# Patient Record
Sex: FEMALE | Race: WHITE | NOT HISPANIC OR LATINO | Employment: FULL TIME | URBAN - METROPOLITAN AREA
[De-identification: names, ages, dates, MRNs, and addresses within clinical notes are randomized per-mention and may not be internally consistent; named-entity substitution may affect disease eponyms.]

---

## 2019-03-03 ENCOUNTER — APPOINTMENT (EMERGENCY)
Dept: CT IMAGING | Facility: HOSPITAL | Age: 59
End: 2019-03-03
Payer: COMMERCIAL

## 2019-03-03 ENCOUNTER — HOSPITAL ENCOUNTER (EMERGENCY)
Facility: HOSPITAL | Age: 59
Discharge: HOME | End: 2019-03-03
Attending: EMERGENCY MEDICINE | Admitting: EMERGENCY MEDICINE
Payer: COMMERCIAL

## 2019-03-03 ENCOUNTER — OFFICE VISIT (OUTPATIENT)
Dept: URGENT CARE | Age: 59
End: 2019-03-03
Payer: COMMERCIAL

## 2019-03-03 VITALS
SYSTOLIC BLOOD PRESSURE: 218 MMHG | TEMPERATURE: 99.3 F | BODY MASS INDEX: 30.38 KG/M2 | OXYGEN SATURATION: 98 % | HEART RATE: 96 BPM | WEIGHT: 177 LBS | DIASTOLIC BLOOD PRESSURE: 92 MMHG | RESPIRATION RATE: 18 BRPM

## 2019-03-03 VITALS
BODY MASS INDEX: 30.65 KG/M2 | TEMPERATURE: 98.1 F | HEART RATE: 66 BPM | DIASTOLIC BLOOD PRESSURE: 84 MMHG | WEIGHT: 178.57 LBS | RESPIRATION RATE: 16 BRPM | OXYGEN SATURATION: 97 % | SYSTOLIC BLOOD PRESSURE: 205 MMHG

## 2019-03-03 DIAGNOSIS — I10 ACCELERATED HYPERTENSION: Primary | ICD-10-CM

## 2019-03-03 DIAGNOSIS — I10 HYPERTENSION, UNSPECIFIED TYPE: Primary | ICD-10-CM

## 2019-03-03 LAB
ALBUMIN SERPL BCP-MCNC: 4.2 G/DL (ref 3.5–5)
ALP SERPL-CCNC: 75 U/L (ref 46–116)
ALT SERPL W P-5'-P-CCNC: 21 U/L (ref 12–78)
ANION GAP SERPL CALCULATED.3IONS-SCNC: 10 MMOL/L (ref 4–13)
AST SERPL W P-5'-P-CCNC: 24 U/L (ref 5–45)
BASOPHILS # BLD AUTO: 0.05 THOUSANDS/ΜL (ref 0–0.1)
BASOPHILS NFR BLD AUTO: 1 % (ref 0–1)
BILIRUB SERPL-MCNC: 0.21 MG/DL (ref 0.2–1)
BUN SERPL-MCNC: 9 MG/DL (ref 5–25)
CALCIUM SERPL-MCNC: 9.5 MG/DL (ref 8.3–10.1)
CHLORIDE SERPL-SCNC: 100 MMOL/L (ref 100–108)
CO2 SERPL-SCNC: 28 MMOL/L (ref 21–32)
CREAT SERPL-MCNC: 0.85 MG/DL (ref 0.6–1.3)
EOSINOPHIL # BLD AUTO: 0.16 THOUSAND/ΜL (ref 0–0.61)
EOSINOPHIL NFR BLD AUTO: 3 % (ref 0–6)
ERYTHROCYTE [DISTWIDTH] IN BLOOD BY AUTOMATED COUNT: 13.1 % (ref 11.6–15.1)
GFR SERPL CREATININE-BSD FRML MDRD: 76 ML/MIN/1.73SQ M
GLUCOSE SERPL-MCNC: 102 MG/DL (ref 65–140)
HCT VFR BLD AUTO: 44.1 % (ref 34.8–46.1)
HGB BLD-MCNC: 14.5 G/DL (ref 11.5–15.4)
IMM GRANULOCYTES # BLD AUTO: 0.02 THOUSAND/UL (ref 0–0.2)
IMM GRANULOCYTES NFR BLD AUTO: 0 % (ref 0–2)
LYMPHOCYTES # BLD AUTO: 1.39 THOUSANDS/ΜL (ref 0.6–4.47)
LYMPHOCYTES NFR BLD AUTO: 22 % (ref 14–44)
MCH RBC QN AUTO: 34.4 PG (ref 26.8–34.3)
MCHC RBC AUTO-ENTMCNC: 32.9 G/DL (ref 31.4–37.4)
MCV RBC AUTO: 105 FL (ref 82–98)
MONOCYTES # BLD AUTO: 0.31 THOUSAND/ΜL (ref 0.17–1.22)
MONOCYTES NFR BLD AUTO: 5 % (ref 4–12)
NEUTROPHILS # BLD AUTO: 4.3 THOUSANDS/ΜL (ref 1.85–7.62)
NEUTS SEG NFR BLD AUTO: 69 % (ref 43–75)
NRBC BLD AUTO-RTO: 0 /100 WBCS
PLATELET # BLD AUTO: 214 THOUSANDS/UL (ref 149–390)
PMV BLD AUTO: 9.9 FL (ref 8.9–12.7)
POTASSIUM SERPL-SCNC: 3.8 MMOL/L (ref 3.5–5.3)
PROT SERPL-MCNC: 8.5 G/DL (ref 6.4–8.2)
RBC # BLD AUTO: 4.22 MILLION/UL (ref 3.81–5.12)
SODIUM SERPL-SCNC: 138 MMOL/L (ref 136–145)
WBC # BLD AUTO: 6.23 THOUSAND/UL (ref 4.31–10.16)

## 2019-03-03 PROCEDURE — 70450 CT HEAD/BRAIN W/O DYE: CPT

## 2019-03-03 PROCEDURE — S9088 SERVICES PROVIDED IN URGENT: HCPCS | Performed by: NURSE PRACTITIONER

## 2019-03-03 PROCEDURE — 99285 EMERGENCY DEPT VISIT HI MDM: CPT

## 2019-03-03 PROCEDURE — 36415 COLL VENOUS BLD VENIPUNCTURE: CPT | Performed by: EMERGENCY MEDICINE

## 2019-03-03 PROCEDURE — 80053 COMPREHEN METABOLIC PANEL: CPT | Performed by: EMERGENCY MEDICINE

## 2019-03-03 PROCEDURE — 85025 COMPLETE CBC W/AUTO DIFF WBC: CPT | Performed by: EMERGENCY MEDICINE

## 2019-03-03 PROCEDURE — 99213 OFFICE O/P EST LOW 20 MIN: CPT | Performed by: NURSE PRACTITIONER

## 2019-03-03 PROCEDURE — 93005 ELECTROCARDIOGRAM TRACING: CPT

## 2019-03-03 PROCEDURE — 96374 THER/PROPH/DIAG INJ IV PUSH: CPT

## 2019-03-03 PROCEDURE — 96375 TX/PRO/DX INJ NEW DRUG ADDON: CPT

## 2019-03-03 RX ORDER — AMLODIPINE BESYLATE 5 MG/1
5 TABLET ORAL DAILY
Qty: 30 TABLET | Refills: 0 | Status: SHIPPED | OUTPATIENT
Start: 2019-03-03 | End: 2019-03-21

## 2019-03-03 RX ORDER — HYDRALAZINE HYDROCHLORIDE 20 MG/ML
5 INJECTION INTRAMUSCULAR; INTRAVENOUS ONCE
Status: COMPLETED | OUTPATIENT
Start: 2019-03-03 | End: 2019-03-03

## 2019-03-03 RX ORDER — LABETALOL 20 MG/4 ML (5 MG/ML) INTRAVENOUS SYRINGE
10 ONCE
Status: COMPLETED | OUTPATIENT
Start: 2019-03-03 | End: 2019-03-03

## 2019-03-03 RX ADMIN — HYDRALAZINE HYDROCHLORIDE 5 MG: 20 INJECTION INTRAMUSCULAR; INTRAVENOUS at 15:33

## 2019-03-03 RX ADMIN — LABETALOL 20 MG/4 ML (5 MG/ML) INTRAVENOUS SYRINGE 10 MG: at 13:59

## 2019-03-03 NOTE — PROGRESS NOTES
[facility] [employer]        NAME: Shania Salazar is a 63 y o  female  : 1960    MRN: [id number]  DATE: March 3, 2019  TIME: 12:24 PM    Assessment and Plan   Hypertension, unspecified type [I10]  1  Hypertension, unspecified type  Transfer to other facility         Patient Instructions     Patient stable in office  Proceed to ED  Follow up with PCP in 3-5 days  Proceed to  ER if symptoms worsen  Chief Complaint     Chief Complaint   Patient presents with    Hypertension         History of Present Illness       60-year-old female presenting today with reports of high blood pressure noticed yesterday after "not feeling right " She states BP yesterday was 190s/90s  She felt off and had a medical tech check it for her  In office today, she is 218/92 manually  No CP, SOB, dizziness, headache, blurred vision, weakness, or confusion  She has not seen a PCP "in a long time " Poor diet but states she limits salt  No other complaints  Review of Systems   Review of Systems   Constitutional: Negative  Respiratory: Negative  Cardiovascular: Negative  Musculoskeletal: Negative  Neurological: Negative  Current Medications     No current outpatient medications on file  Current Allergies     Allergies as of 2019    (No Known Allergies)            The following portions of the patient's history were reviewed and updated as appropriate: allergies, current medications, past family history, past medical history, past social history, past surgical history and problem list      History reviewed  No pertinent past medical history  Past Surgical History:   Procedure Laterality Date    APPENDECTOMY      APPENDISITIS SURGERY AGE 26    BACK SURGERY      BROKEN BACK BED REST AND ACK BRACE        Family History   Problem Relation Age of Onset    COPD Mother          Medications have been verified          Objective   BP (!) 218/92 Comment: manual right arm  Pulse 96   Temp 99 3 °F (37 4 °C)   Resp 18   Wt 80 3 kg (177 lb)   SpO2 98%   BMI 30 38 kg/m²        Physical Exam     Physical Exam   Constitutional: She is oriented to person, place, and time  She appears well-developed and well-nourished  Eyes: Pupils are equal, round, and reactive to light  Conjunctivae and EOM are normal    Cardiovascular: Normal rate, regular rhythm and normal heart sounds  Pulmonary/Chest: Effort normal and breath sounds normal    Musculoskeletal: Normal range of motion  Neurological: She is alert and oriented to person, place, and time  She has normal strength  No cranial nerve deficit or sensory deficit  She displays a negative Romberg sign  GCS eye subscore is 4  GCS verbal subscore is 5  GCS motor subscore is 6  Skin: Skin is warm and dry  Nursing note and vitals reviewed

## 2019-03-03 NOTE — ED NOTES
Spoke with Dr Rafi Ramos, made him aware that pts BP is now 171/81  He states to hold hydralazine unless pt elevates again  Pt asked if she can go to bathroom, assisted pt to monitor at this time        Arin Lee RN  03/03/19 [id number]

## 2019-03-03 NOTE — ED PROVIDER NOTES
History  Chief Complaint   Patient presents with    Hypertension     Pt reports no known diagnosis of HTN, yesterday at work felt "weird and foggy", had BP checked at noted it was extremely high  reports gait feels off       History provided by:  Patient   used: No    Hypertension   Severity:  Moderate  Onset quality:  Gradual  Duration:  2 days  Timing:  Intermittent  Progression:  Unchanged  Chronicity:  Recurrent  Time since last dose of antihypertensive: not on meds  Context: stress    Relieved by:  Nothing  Worsened by:  Nothing  Ineffective treatments:  None tried  Associated symptoms: no abdominal pain, no blurred vision, no chest pain, no dizziness, no fever, no headaches, no loss of consciousness, no nausea, no neck pain, no shortness of breath, not vomiting and no weakness    Risk factors: no cardiac disease and no kidney disease        None       Past Medical History:   Diagnosis Date    Hypertension        Past Surgical History:   Procedure Laterality Date    APPENDECTOMY      APPENDISITIS SURGERY AGE 26       Family History   Problem Relation Age of Onset    COPD Mother      I have reviewed and agree with the history as documented  Social History     Tobacco Use    Smoking status: Current Every Day Smoker     Packs/day: 0 75     Types: Cigarettes    Smokeless tobacco: Never Used    Tobacco comment: 10 CIGARETTES PER DAY - NO PASSIVE SMOKE EXPOSURE    Substance Use Topics    Alcohol use: Yes     Alcohol/week: 4 2 oz     Types: 7 Glasses of wine per week    Drug use: Not Currently        Review of Systems   Constitutional: Negative for chills and fever  HENT: Negative for facial swelling, sore throat and trouble swallowing  Eyes: Negative for blurred vision, pain and visual disturbance  Respiratory: Negative for cough and shortness of breath  Cardiovascular: Negative for chest pain and leg swelling     Gastrointestinal: Negative for abdominal pain, blood in stool, diarrhea, nausea and vomiting  Genitourinary: Negative for dysuria and flank pain  Musculoskeletal: Negative for back pain, neck pain and neck stiffness  Skin: Negative for pallor and rash  Allergic/Immunologic: Negative for environmental allergies and immunocompromised state  Neurological: Negative for dizziness, loss of consciousness, weakness and headaches  Hematological: Negative for adenopathy  Does not bruise/bleed easily  Psychiatric/Behavioral: Negative for agitation and behavioral problems  All other systems reviewed and are negative  Physical Exam  Physical Exam   Constitutional: She is oriented to person, place, and time  She appears well-developed and well-nourished  No distress  HENT:   Head: Normocephalic and atraumatic  Eyes: Pupils are equal, round, and reactive to light  EOM are normal    Neck: Normal range of motion  Neck supple  Cardiovascular: Normal rate, regular rhythm, normal heart sounds and intact distal pulses  Pulmonary/Chest: Effort normal and breath sounds normal    Abdominal: Soft  Bowel sounds are normal  There is no tenderness  There is no rebound and no guarding  Musculoskeletal: Normal range of motion  Neurological: She is alert and oriented to person, place, and time  No cranial nerve deficit  Coordination normal    Skin: Skin is warm and dry  Psychiatric: She has a normal mood and affect  Nursing note and vitals reviewed        Vital Signs  ED Triage Vitals   Temperature Pulse Respirations Blood Pressure SpO2   03/03/19 1300 03/03/19 1256 03/03/19 1256 03/03/19 1256 03/03/19 1256   98 1 °F (36 7 °C) 90 16 (!) 238/108 98 %      Temp Source Heart Rate Source Patient Position - Orthostatic VS BP Location FiO2 (%)   03/03/19 1300 03/03/19 1433 03/03/19 1334 03/03/19 1256 --   Oral Monitor Lying Right arm       Pain Score       03/03/19 1256       No Pain           Vitals:    03/03/19 1401 03/03/19 1433 03/03/19 1531 03/03/19 1557   BP: (!) 205/88 (!) 171/81 (!) 206/102 (!) 205/84   Pulse: 70 62 68 66   Patient Position - Orthostatic VS:  Lying Lying Lying       Visual Acuity  Visual Acuity      Most Recent Value   L Pupil Size (mm)  3   R Pupil Size (mm)  3          ED Medications  Medications   Labetalol HCl (NORMODYNE) injection 10 mg (10 mg Intravenous Given 3/3/19 1359)   hydrALAZINE (APRESOLINE) injection 5 mg (5 mg Intravenous Given 3/3/19 1533)       Diagnostic Studies  Results Reviewed     Procedure Component Value Units Date/Time    Comprehensive metabolic panel [[id number]]  (Abnormal) Collected:  03/03/19 1311    Lab Status:  Final result Specimen:  Blood from Arm, Left Updated:  03/03/19 1417     Sodium 138 mmol/L      Potassium 3 8 mmol/L      Chloride 100 mmol/L      CO2 28 mmol/L      ANION GAP 10 mmol/L      BUN 9 mg/dL      Creatinine 0 85 mg/dL      Glucose 102 mg/dL      Calcium 9 5 mg/dL      AST 24 U/L      ALT 21 U/L      Alkaline Phosphatase 75 U/L      Total Protein 8 5 g/dL      Albumin 4 2 g/dL      Total Bilirubin 0 21 mg/dL      eGFR 76 ml/min/1 73sq m     Narrative:       National Kidney Disease Education Program recommendations are as follows:  GFR calculation is accurate only with a steady state creatinine  Chronic Kidney disease less than 60 ml/min/1 73 sq  meters  Kidney failure less than 15 ml/min/1 73 sq  meters      CBC and differential [[id number]]  (Abnormal) Collected:  03/03/19 1311    Lab Status:  Final result Specimen:  Blood from Arm, Left Updated:  03/03/19 1356     WBC 6 23 Thousand/uL      RBC 4 22 Million/uL      Hemoglobin 14 5 g/dL      Hematocrit 44 1 %       fL      MCH 34 4 pg      MCHC 32 9 g/dL      RDW 13 1 %      MPV 9 9 fL      Platelets 335 Thousands/uL      nRBC 0 /100 WBCs      Neutrophils Relative 69 %      Immat GRANS % 0 %      Lymphocytes Relative 22 %      Monocytes Relative 5 %      Eosinophils Relative 3 %      Basophils Relative 1 %      Neutrophils Absolute 4 30 Thousands/µL Immature Grans Absolute 0 02 Thousand/uL      Lymphocytes Absolute 1 39 Thousands/µL      Monocytes Absolute 0 31 Thousand/µL      Eosinophils Absolute 0 16 Thousand/µL      Basophils Absolute 0 05 Thousands/µL                  CT head without contrast   Final Result by [place] MD (03/03 9884)      No acute intracranial abnormality                    Workstation performed: [id number]                    Procedures  ECG 12 Lead Documentation  Date/Time: 3/3/2019 4:14 PM  Performed by: Estelle Damon MD  Authorized by: Estelle Damon MD     Indications / Diagnosis:  Hypertension  ECG reviewed by me, the ED Provider: yes    Patient location:  ED  Interpretation:     Interpretation: normal    Rate:     ECG rate:  63    ECG rate assessment: normal    Rhythm:     Rhythm: sinus rhythm    Ectopy:     Ectopy: none    QRS:     QRS axis:  Normal  Conduction:     Conduction: normal    ST segments:     ST segments:  Normal  T waves:     T waves: normal      CriticalCare Time  Performed by: Estelle Damon MD  Authorized by: Estelle Damon MD     Critical care provider statement:     Critical care time (minutes):  30    Critical care time was exclusive of:  Separately billable procedures and treating other patients and teaching time    Critical care was necessary to treat or prevent imminent or life-threatening deterioration of the following conditions:  Circulatory failure (Accelerated hypertension, requiring IV meds)    Critical care was time spent personally by me on the following activities:  Blood draw for specimens, obtaining history from patient or surrogate, development of treatment plan with patient or surrogate, discussions with consultants, evaluation of patient's response to treatment, examination of patient, interpretation of cardiac output measurements, ordering and performing treatments and interventions, ordering and review of laboratory studies, ordering and review of radiographic studies, re-evaluation of patient's condition and review of old charts    I assumed direction of critical care for this patient from another provider in my specialty: no             Phone Contacts  ED Phone Contact    ED Course  ED Course as of Mar 03 1634   Zaira Manrique Mar 03, 2019   1344 Blood pressure repeated 242/103, we will give Labetalol  Blood Pressure(!): 242/103   1431 BP noted after Labetalol, still high, we will order Hrydralazine  Blood Pressure(!): 205/88   1448 Blood pressure came down to 177/81 prior to hydralazine therefore hydralazine held  Blood Pressure(!): 171/81   1610 Blood pressure at 713/49, mainly systolic hypertension  Patient wants to go home, walked in ER without difficulty; will give amlodipine 5 mg, advise close follow-up with PCP  Blood Pressure(!): 205/84                               MDM  Number of Diagnoses or Management Options  Accelerated hypertension: new and requires workup  Diagnosis management comments: Patient is a 60-year-old female, comes in with complaints of hypertension, states that she felt dizzy yesterday, checked her blood pressure, was in 200s, denies headache, chest pain, dyspnea, abdominal or back pain  Exam no acute distress:  Vital signs stable, blood pressure noted to be high, pupils equal round reactive to light, no cranial nerve deficits, no focal neuro deficits, lungs clear, heart sounds normal, no peripheral edema, abdomen soft nontender  Impression:  Accelerated hypertension, will check labs to rule out renal insufficiency, get CT scan to rule out bleed due to nonspecific dizziness with hypertension, give labetalol, re-evaluate         Amount and/or Complexity of Data Reviewed  Clinical lab tests: reviewed and ordered  Tests in the radiology section of CPT®: reviewed and ordered  Tests in the medicine section of CPT®: ordered and reviewed  Independent visualization of images, tracings, or specimens: yes        Disposition  Final diagnoses:   Accelerated hypertension     Time reflects when diagnosis was documented in both MDM as applicable and the Disposition within this note     Time User Action Codes Description Comment    3/3/2019  4:18 PM Buzz, [address] Accelerated hypertension       ED Disposition     ED Disposition Condition Date/Time Comment    Discharge Stable Sun Mar 3, 2019  4:18 PM Kellen Taylor discharge to home/self care  Follow-up Information     Follow up With Specialties Details Why Contact Info Additional [address] [facility] Schedule an appointment as soon as possible for a visit in 2 days  [address] [place] [address] Drive [id number] [address] [facility] Emergency Department Emergency Medicine  If symptoms worsen [facility] [phone]  [place] [address] ED, [address], [place], [address]          Patient's Medications   Discharge Prescriptions    AMLODIPINE (NORVASC) 5 MG TABLET    Take 1 tablet (5 mg total) by mouth daily       Start Date: 3/3/2019  End Date: --       Order Dose: 5 mg       Quantity: 30 tablet    Refills: 0     No discharge procedures on file      ED Provider  Electronically Signed by           Immanuel Arellano MD  03/03/19 9682

## 2019-03-04 PROBLEM — I10 ESSENTIAL HYPERTENSION: Status: ACTIVE | Noted: 2019-03-04

## 2019-03-04 LAB
ATRIAL RATE: 63 BPM
P AXIS: 55 DEGREES
PR INTERVAL: 152 MS
QRS AXIS: 66 DEGREES
QRSD INTERVAL: 80 MS
QT INTERVAL: 388 MS
QTC INTERVAL: 397 MS
T WAVE AXIS: 68 DEGREES
VENTRICULAR RATE: 63 BPM

## 2019-03-04 PROCEDURE — 93010 ELECTROCARDIOGRAM REPORT: CPT | Performed by: INTERNAL MEDICINE

## 2019-03-05 ENCOUNTER — HOSPITAL ENCOUNTER (OUTPATIENT)
Dept: RADIOLOGY | Facility: IMAGING CENTER | Age: 59
Discharge: HOME | End: 2019-03-05
Payer: COMMERCIAL

## 2019-03-05 ENCOUNTER — OFFICE VISIT (OUTPATIENT)
Dept: FAMILY MEDICINE CLINIC | Facility: CLINIC | Age: 59
End: 2019-03-05
Payer: COMMERCIAL

## 2019-03-05 VITALS
HEART RATE: 104 BPM | SYSTOLIC BLOOD PRESSURE: 170 MMHG | BODY MASS INDEX: 32.28 KG/M2 | TEMPERATURE: 98.4 F | DIASTOLIC BLOOD PRESSURE: 104 MMHG | OXYGEN SATURATION: 97 % | HEIGHT: 62 IN | WEIGHT: 175.4 LBS | RESPIRATION RATE: 16 BRPM

## 2019-03-05 DIAGNOSIS — Z13.220 LIPID SCREENING: ICD-10-CM

## 2019-03-05 DIAGNOSIS — M25.511 CHRONIC RIGHT SHOULDER PAIN: ICD-10-CM

## 2019-03-05 DIAGNOSIS — G89.29 CHRONIC RIGHT SHOULDER PAIN: ICD-10-CM

## 2019-03-05 DIAGNOSIS — Z12.11 COLON CANCER SCREENING: ICD-10-CM

## 2019-03-05 DIAGNOSIS — Z11.59 ENCOUNTER FOR HEPATITIS C VIRUS SCREENING TEST FOR HIGH RISK PATIENT: Primary | ICD-10-CM

## 2019-03-05 DIAGNOSIS — I10 ESSENTIAL HYPERTENSION: ICD-10-CM

## 2019-03-05 DIAGNOSIS — Z91.89 ENCOUNTER FOR HEPATITIS C VIRUS SCREENING TEST FOR HIGH RISK PATIENT: Primary | ICD-10-CM

## 2019-03-05 PROCEDURE — 73030 X-RAY EXAM OF SHOULDER: CPT

## 2019-03-05 PROCEDURE — 99204 OFFICE O/P NEW MOD 45 MIN: CPT | Performed by: PHYSICIAN ASSISTANT

## 2019-03-05 RX ORDER — LISINOPRIL AND HYDROCHLOROTHIAZIDE 25; 20 MG/1; MG/1
1 TABLET ORAL DAILY
Qty: 90 TABLET | Refills: 1 | Status: SHIPPED | OUTPATIENT
Start: 2019-03-05 | End: 2019-06-27 | Stop reason: SINTOL

## 2019-03-05 NOTE — PATIENT INSTRUCTIONS
Hold amlodipine 5 milligrams daily  Start lisinopril/hydrochlorothiazide 20/25 milligrams daily  Proceed with blood work in 6-8 weeks to check potassium level, hepatitis C antibody and lipid panel  Avoid any NSAIDs which can elevate blood pressure including Advil, ibuprofen, naproxen, Aleve  Call 911 if having chest pain, shortness of breath, weakness, severe headache    Recommend physical therapy for chronic right shoulder pain  Apply ice to shoulder 3 times daily for 10 minutes  Follow-up if there is no improvement with therapy in 4-6 weeks or symptoms increase  Take Tylenol over-the-counter as needed as package direct for pain    Screening colonoscopy ordered  Hep C antibody ordered  Patient refuses flu vaccine  Patient prefers to hold on the mammogram at this time    She will consider this in the future  Follow-up here in 3 weeks to recheck blood pressure

## 2019-03-05 NOTE — PROGRESS NOTES
Assessment/Plan:    ER record has been reviewed    Patient Instructions   Hold amlodipine 5 milligrams daily  Start lisinopril/hydrochlorothiazide 20/25 milligrams daily  Proceed with blood work in 6-8 weeks to check potassium level, hepatitis C antibody and lipid panel  Avoid any NSAIDs which can elevate blood pressure including Advil, ibuprofen, naproxen, Aleve  Call 911 if having chest pain, shortness of breath, weakness, severe headache    Recommend physical therapy for chronic right shoulder pain  Apply ice to shoulder 3 times daily for 10 minutes  Follow-up if there is no improvement with therapy in 4-6 weeks or symptoms increase  Take Tylenol over-the-counter as needed as package direct for pain    Screening colonoscopy ordered  Hep C antibody ordered  Patient refuses flu vaccine  Patient prefers to hold on the mammogram at this time  She will consider this in the future  Follow-up here in 3 weeks to recheck blood pressure    M*[employer] software was used to dictate this note  It may contain errors with dictating incorrect words/spelling  Please contact provider directly for any questions  Diagnoses and all orders for this visit:    Encounter for hepatitis C virus screening test for high risk patient  -     Hepatitis C antibody; Future    Colon cancer screening  -     Cancel: Occult Blood, Fecal Immunochemical; Future  -     Ambulatory referral to General Surgery; Future    Essential hypertension  -     lisinopril-hydrochlorothiazide (PRINZIDE,ZESTORETIC) 20-25 MG per tablet; Take 1 tablet by mouth daily  -     Basic metabolic panel; Future  -     Lipid panel    Chronic right shoulder pain  -     Ambulatory referral to Physical Therapy;  Future  -     XR shoulder 2+ vw right; Future    Lipid screening  -     Lipid panel    Other orders  -     Cancel: PNEUMOCOCCAL POLYSACCHARIDE VACCINE 23-VALENT =>1YO SQ IM  -     Cancel: PREFERRED: influenza vaccine, [id number], quadrivalent, recombinant, PF, 0 5 mL, for patients 18 yr+ (FLUBLOK)          Subjective:      Patient ID: Marcos Armendariz is a 63 y o  female  Patient presents today for new patient establishment  She was recently seen in the emergency room because she was not feeling well  She was found to have significantly elevated blood pressure  She was placed on amlodipine 5 milligrams daily  She started the medication yesterday  She denies any chest pain, shortness of breath or swelling of her lower extremities  She states that she was told she had elevated blood pressure several years ago when she was seen at an urgent care center but thought maybe is related to her illness  She discontinued the medication that was given  Denies any known family history of hypertension but she is not sure of all the medical problems that her family had  Also complains of right shoulder pain since the summer of 2018  She states that she fell walking here in [facility] because of uneven pavement  She is not sure if she fell onto the right shoulder  Within an hour she noticed some discomfort of the right shoulder  The other days she was leaning down her palm and felt a pop in her shoulder  She continues to notice discomfort off and on especially with certain motions  Denies any treatment for the shoulder  She does not follow with any providers on a regular basis  The following portions of the patient's history were reviewed and updated as appropriate:   She  has a past medical history of Hypertension  She   Patient Active Problem List    Diagnosis Date Noted    Chronic right shoulder pain 03/05/2019    Encounter for hepatitis C virus screening test for high risk patient 03/05/2019    Colon cancer screening 03/05/2019    Lipid screening 03/05/2019    Essential hypertension 03/04/2019     She  has a past surgical history that includes Appendectomy  Her family history includes COPD in her mother  She  reports that she has been smoking cigarettes    She has been smoking about 0 50 packs per day  She has never used smokeless tobacco  She reports that she drinks about 4 2 oz of alcohol per week  She reports that she has current or past drug history  Current Outpatient Medications   Medication Sig Dispense Refill    amLODIPine (NORVASC) 5 mg tablet Take 1 tablet (5 mg total) by mouth daily 30 tablet 0    lisinopril-hydrochlorothiazide (PRINZIDE,ZESTORETIC) 20-25 MG per tablet Take 1 tablet by mouth daily 90 tablet 1     No current facility-administered medications for this visit  Current Outpatient Medications on File Prior to Visit   Medication Sig    amLODIPine (NORVASC) 5 mg tablet Take 1 tablet (5 mg total) by mouth daily     No current facility-administered medications on file prior to visit  She is allergic to pollen extract       Review of Systems   Constitutional: Negative  HENT: Negative  Eyes: Negative  She does wear glasses   Respiratory: Negative  Cardiovascular: Negative  Gastrointestinal: Negative  Endocrine: Negative  Genitourinary: Negative  Musculoskeletal:        As stated in HPI   Allergic/Immunologic: Positive for environmental allergies  Neurological: Negative  Hematological: Negative  Psychiatric/Behavioral: Negative            Objective:      BP (!) 170/104   Pulse 104   Temp 98 4 °F (36 9 °C) (Tympanic)   Resp 16   Ht 5' 2" (1 575 m)   Wt 79 6 kg (175 lb 6 4 oz)   SpO2 97%   BMI 32 08 kg/m²          Physical Exam

## 2019-03-21 ENCOUNTER — OFFICE VISIT (OUTPATIENT)
Dept: FAMILY MEDICINE CLINIC | Facility: CLINIC | Age: 59
End: 2019-03-21
Payer: COMMERCIAL

## 2019-03-21 VITALS
TEMPERATURE: 97.9 F | OXYGEN SATURATION: 97 % | SYSTOLIC BLOOD PRESSURE: 128 MMHG | WEIGHT: 175.8 LBS | HEART RATE: 92 BPM | BODY MASS INDEX: 32.35 KG/M2 | DIASTOLIC BLOOD PRESSURE: 70 MMHG | HEIGHT: 62 IN | RESPIRATION RATE: 16 BRPM

## 2019-03-21 DIAGNOSIS — R26.9 GAIT ABNORMALITY: ICD-10-CM

## 2019-03-21 DIAGNOSIS — I10 ESSENTIAL HYPERTENSION: Primary | ICD-10-CM

## 2019-03-21 DIAGNOSIS — R05.3 CHRONIC COUGH: ICD-10-CM

## 2019-03-21 PROBLEM — F17.210 CIGARETTE NICOTINE DEPENDENCE WITHOUT COMPLICATION: Status: ACTIVE | Noted: 2019-03-21

## 2019-03-21 PROCEDURE — 99214 OFFICE O/P EST MOD 30 MIN: CPT | Performed by: PHYSICIAN ASSISTANT

## 2019-03-21 PROCEDURE — 3074F SYST BP LT 130 MM HG: CPT | Performed by: PHYSICIAN ASSISTANT

## 2019-03-21 PROCEDURE — 3008F BODY MASS INDEX DOCD: CPT | Performed by: PHYSICIAN ASSISTANT

## 2019-03-21 PROCEDURE — 3078F DIAST BP <80 MM HG: CPT | Performed by: PHYSICIAN ASSISTANT

## 2019-03-21 NOTE — PATIENT INSTRUCTIONS
Continue lisinopril/hydrochlorothiazide as directed  Advised if the cough is not improved in the next 2 weeks the only way to determine if the cough is related to the medication is by discontinuing the medication  The cough should resolve within several days of stopping the medication    Smoking cessation has been discussed  Refer to physical therapy for gait abnormality specifically with going down stairs  Refer to Neurology  Proceed with fasting blood work in about 6 weeks  Follow-up here in 3-4 months

## 2019-03-21 NOTE — PROGRESS NOTES
Assessment/Plan:    Patient Instructions   Continue lisinopril/hydrochlorothiazide as directed  Advised if the cough is not improved in the next 2 weeks the only way to determine if the cough is related to the medication is by discontinuing the medication  The cough should resolve within several days of stopping the medication  Smoking cessation has been discussed  Refer to physical therapy for gait abnormality specifically with going down stairs  Refer to Neurology  Proceed with fasting blood work in about 6 weeks  Follow-up here in 3-4 months    M*Modal software was used to dictate this note  It may contain errors with dictating incorrect words/spelling  Please contact provider directly for any questions  Diagnoses and all orders for this visit:    Essential hypertension    Gait abnormality  -     Ambulatory referral to Neurology; Future  -     Ambulatory referral to Physical Therapy; Future    Chronic cough          Subjective:      Patient ID: Betty Pastrana is a 63 y o  female  Patient returns today to recheck her blood pressure since she started the lisinopril/hydrochlorothiazide 20/25 milligrams daily  She does have a chronic cough  She does not know if the lisinopril possibly  exacerbated her cough  She does smoke at least a half pack of cigarettes daily  Denies any fevers or chills  She also states several weeks ago she had an episode where her she had a hard time getting the words out  There supervisor told her to lay down and speech returned to normal on its own  She never did go to the emergency room  She also complains of a problem with walking specifically when going downstairs  She has a fear of possibly falling  This has been an ongoing problem  She has not made an appointment yet for her shoulder for physical therapy  The following portions of the patient's history were reviewed and updated as appropriate:   She  has a past medical history of Hypertension    She Patient Active Problem List    Diagnosis Date Noted    Gait abnormality 03/21/2019    Chronic cough 03/21/2019    Cigarette nicotine dependence without complication 84/76/6000    Chronic right shoulder pain 03/05/2019    Encounter for hepatitis C virus screening test for high risk patient 03/05/2019    Colon cancer screening 03/05/2019    Lipid screening 03/05/2019    Essential hypertension 03/04/2019     She  has a past surgical history that includes Appendectomy  Her family history includes COPD in her mother  She  reports that she has been smoking cigarettes  She has been smoking about 0 50 packs per day  She has never used smokeless tobacco  She reports that she drinks about 4 2 oz of alcohol per week  She reports that she has current or past drug history  Current Outpatient Medications   Medication Sig Dispense Refill    lisinopril-hydrochlorothiazide (PRINZIDE,ZESTORETIC) 20-25 MG per tablet Take 1 tablet by mouth daily 90 tablet 1     No current facility-administered medications for this visit  Current Outpatient Medications on File Prior to Visit   Medication Sig    lisinopril-hydrochlorothiazide (PRINZIDE,ZESTORETIC) 20-25 MG per tablet Take 1 tablet by mouth daily    [DISCONTINUED] amLODIPine (NORVASC) 5 mg tablet Take 1 tablet (5 mg total) by mouth daily     No current facility-administered medications on file prior to visit  She is allergic to pollen extract       Review of Systems   Neurological:        As stated in HPI         Objective:      /70 (BP Location: Left arm, Patient Position: Sitting, Cuff Size: Standard)   Pulse 92   Temp 97 9 °F (36 6 °C) (Tympanic)   Resp 16   Ht 5' 2" (1 575 m)   Wt 79 7 kg (175 lb 12 8 oz)   SpO2 97%   BMI 32 15 kg/m²          Physical Exam   Constitutional: She appears well-developed and well-nourished  No distress  HENT:   Head: Normocephalic and atraumatic     Right Ear: External ear normal    Left Ear: External ear normal  Mouth/Throat: Oropharynx is clear and moist  No oropharyngeal exudate  Neck: Neck supple  Cardiovascular: Normal rate, regular rhythm and normal heart sounds  No murmur heard  Pulmonary/Chest: Effort normal  No respiratory distress  She has no wheezes  She has no rales  Reduced breath sounds in all lung fields   Lymphadenopathy:     She has no cervical adenopathy

## 2019-06-18 PROBLEM — Z12.39 BREAST CANCER SCREENING: Status: ACTIVE | Noted: 2019-06-18

## 2019-06-19 ENCOUNTER — TELEPHONE (OUTPATIENT)
Dept: FAMILY MEDICINE CLINIC | Facility: CLINIC | Age: 59
End: 2019-06-19

## 2019-06-27 ENCOUNTER — OFFICE VISIT (OUTPATIENT)
Dept: FAMILY MEDICINE CLINIC | Facility: CLINIC | Age: 59
End: 2019-06-27
Payer: COMMERCIAL

## 2019-06-27 VITALS
RESPIRATION RATE: 16 BRPM | TEMPERATURE: 98.7 F | OXYGEN SATURATION: 97 % | HEART RATE: 100 BPM | SYSTOLIC BLOOD PRESSURE: 128 MMHG | DIASTOLIC BLOOD PRESSURE: 78 MMHG | BODY MASS INDEX: 31.69 KG/M2 | HEIGHT: 62 IN | WEIGHT: 172.2 LBS

## 2019-06-27 DIAGNOSIS — E66.9 OBESITY (BMI 30.0-34.9): ICD-10-CM

## 2019-06-27 DIAGNOSIS — F17.210 CIGARETTE NICOTINE DEPENDENCE WITHOUT COMPLICATION: ICD-10-CM

## 2019-06-27 DIAGNOSIS — R05.3 CHRONIC COUGH: ICD-10-CM

## 2019-06-27 DIAGNOSIS — Z12.31 SCREENING MAMMOGRAM, ENCOUNTER FOR: ICD-10-CM

## 2019-06-27 DIAGNOSIS — I10 ESSENTIAL HYPERTENSION: ICD-10-CM

## 2019-06-27 DIAGNOSIS — Z23 IMMUNIZATION DUE: Primary | ICD-10-CM

## 2019-06-27 PROBLEM — E66.811 OBESITY (BMI 30.0-34.9): Status: ACTIVE | Noted: 2019-06-27

## 2019-06-27 PROCEDURE — 99214 OFFICE O/P EST MOD 30 MIN: CPT | Performed by: PHYSICIAN ASSISTANT

## 2019-06-27 PROCEDURE — 3074F SYST BP LT 130 MM HG: CPT | Performed by: PHYSICIAN ASSISTANT

## 2019-06-27 PROCEDURE — 3008F BODY MASS INDEX DOCD: CPT | Performed by: PHYSICIAN ASSISTANT

## 2019-06-27 PROCEDURE — 3078F DIAST BP <80 MM HG: CPT | Performed by: PHYSICIAN ASSISTANT

## 2019-06-27 RX ORDER — HYDROCHLOROTHIAZIDE 25 MG/1
25 TABLET ORAL DAILY
Qty: 90 TABLET | Refills: 1 | Status: SHIPPED | OUTPATIENT
Start: 2019-06-27 | End: 2019-08-30

## 2019-06-27 RX ORDER — AMLODIPINE BESYLATE 10 MG/1
10 TABLET ORAL DAILY
Qty: 90 TABLET | Refills: 1 | Status: SHIPPED | OUTPATIENT
Start: 2019-06-27 | End: 2019-08-05 | Stop reason: SINTOL

## 2019-06-27 RX ORDER — ALBUTEROL SULFATE 90 UG/1
2 AEROSOL, METERED RESPIRATORY (INHALATION) EVERY 6 HOURS PRN
Qty: 18 G | Refills: 0 | Status: SHIPPED | OUTPATIENT
Start: 2019-06-27 | End: 2019-08-19 | Stop reason: SDUPTHER

## 2019-07-09 ENCOUNTER — OFFICE VISIT (OUTPATIENT)
Dept: FAMILY MEDICINE CLINIC | Facility: CLINIC | Age: 59
End: 2019-07-09
Payer: COMMERCIAL

## 2019-07-09 VITALS
DIASTOLIC BLOOD PRESSURE: 80 MMHG | BODY MASS INDEX: 31.32 KG/M2 | HEIGHT: 62 IN | WEIGHT: 170.2 LBS | OXYGEN SATURATION: 96 % | RESPIRATION RATE: 16 BRPM | TEMPERATURE: 98.4 F | SYSTOLIC BLOOD PRESSURE: 148 MMHG | HEART RATE: 102 BPM

## 2019-07-09 DIAGNOSIS — J30.1 SEASONAL ALLERGIC RHINITIS DUE TO POLLEN: Primary | ICD-10-CM

## 2019-07-09 DIAGNOSIS — I10 ESSENTIAL HYPERTENSION: ICD-10-CM

## 2019-07-09 DIAGNOSIS — F17.210 CIGARETTE NICOTINE DEPENDENCE WITHOUT COMPLICATION: ICD-10-CM

## 2019-07-09 DIAGNOSIS — R05.3 CHRONIC COUGH: ICD-10-CM

## 2019-07-09 PROCEDURE — 99214 OFFICE O/P EST MOD 30 MIN: CPT | Performed by: PHYSICIAN ASSISTANT

## 2019-07-09 RX ORDER — LORATADINE 10 MG/1
10 TABLET ORAL DAILY
Qty: 90 TABLET | Refills: 1 | Status: SHIPPED | OUTPATIENT
Start: 2019-07-09 | End: 2020-10-28 | Stop reason: ALTCHOICE

## 2019-07-09 RX ORDER — MONTELUKAST SODIUM 10 MG/1
10 TABLET ORAL
Qty: 90 TABLET | Refills: 1 | Status: SHIPPED | OUTPATIENT
Start: 2019-07-09 | End: 2020-10-28 | Stop reason: ALTCHOICE

## 2019-07-09 NOTE — PROGRESS NOTES
Assessment/Plan:    Recheck blood pressure is about the same    Refer to allergy/asthma, Dr Sarah Dia for further evaluation  Start loratadine 10 mg daily  Start montelukast 10 mg at bedtime  Continue Ventolin as needed which is usually once a day  I did explain to her that there may be a component of COPD with continued nicotine use  Continue amlodipine 10 mg daily and hydrochlorothiazide 25 mg daily  Recommend follow-up here to recheck blood pressure in about 6-8 weeks     Diagnoses and all orders for this visit:    Seasonal allergic rhinitis due to pollen  -     loratadine (CLARITIN) 10 mg tablet; Take 1 tablet (10 mg total) by mouth daily  -     montelukast (SINGULAIR) 10 mg tablet; Take 1 tablet (10 mg total) by mouth daily at bedtime  -     Ambulatory referral to Allergy; Future    Chronic cough  -     montelukast (SINGULAIR) 10 mg tablet; Take 1 tablet (10 mg total) by mouth daily at bedtime  -     Ambulatory referral to Allergy; Future    Cigarette nicotine dependence without complication    Essential hypertension          Subjective:      Patient ID: Ирина Sharma is a 63 y o  female  Patient presents today for follow-up for a cough that may have been related to lisinopril  She states that the funny sensation the back of her throat resolved since she discontinued the lisinopril  She is concerned about the possibility of allergy/asthma  She states that when she goes home she goes outside to smoke a cigarette  She starts noticing more nasal congestion and develops a cough in which she needs to utilize her Ventolin inhaler  Her daughter has asthma and allergies  Patient also states that she has clear nasal discharge is been ongoing for several months  She is not sure if it is related to the [place] tree in her backyard  She has never been tested for allergies  She continues to smoke at least half a pack of cigarettes daily  She is doing well on the amlodipine and hydrochlorothiazide    She does not monitor her blood pressure  The following portions of the patient's history were reviewed and updated as appropriate:   She  has a past medical history of Hypertension  She   Patient Active Problem List    Diagnosis Date Noted    Seasonal allergic rhinitis due to pollen 07/09/2019    Obesity (BMI 30 0-34 9) 06/27/2019    Breast cancer screening 06/18/2019    Gait abnormality 03/21/2019    Chronic cough 03/21/2019    Cigarette nicotine dependence without complication 80/65/1132    Chronic right shoulder pain 03/05/2019    Encounter for hepatitis C virus screening test for high risk patient 03/05/2019    Colon cancer screening 03/05/2019    Lipid screening 03/05/2019    Essential hypertension 03/04/2019     She  has a past surgical history that includes Appendectomy  Her family history includes COPD in her mother  She  reports that she has been smoking cigarettes  She has been smoking about 0 50 packs per day  She has never used smokeless tobacco  She reports that she drinks about 7 0 standard drinks of alcohol per week  She reports that she has current or past drug history  Current Outpatient Medications   Medication Sig Dispense Refill    albuterol (VENTOLIN HFA) 90 mcg/act inhaler Inhale 2 puffs every 6 (six) hours as needed for wheezing 18 g 0    amLODIPine (NORVASC) 10 mg tablet Take 1 tablet (10 mg total) by mouth daily 90 tablet 1    hydrochlorothiazide (HYDRODIURIL) 25 mg tablet Take 1 tablet (25 mg total) by mouth daily 90 tablet 1    loratadine (CLARITIN) 10 mg tablet Take 1 tablet (10 mg total) by mouth daily 90 tablet 1    montelukast (SINGULAIR) 10 mg tablet Take 1 tablet (10 mg total) by mouth daily at bedtime 90 tablet 1     No current facility-administered medications for this visit        Current Outpatient Medications on File Prior to Visit   Medication Sig    albuterol (VENTOLIN HFA) 90 mcg/act inhaler Inhale 2 puffs every 6 (six) hours as needed for wheezing    amLODIPine (NORVASC) 10 mg tablet Take 1 tablet (10 mg total) by mouth daily    hydrochlorothiazide (HYDRODIURIL) 25 mg tablet Take 1 tablet (25 mg total) by mouth daily     No current facility-administered medications on file prior to visit  She is allergic to lisinopril and pollen extract       Review of Systems   Constitutional: Negative for chills and fever  HENT: Positive for congestion, postnasal drip and rhinorrhea  Negative for sore throat  Respiratory: Positive for cough and wheezing  Objective:      /80 (BP Location: Left arm, Patient Position: Sitting, Cuff Size: Large)   Pulse 102   Temp 98 4 °F (36 9 °C) (Tympanic)   Resp 16   Ht 5' 2" (1 575 m)   Wt 77 2 kg (170 lb 3 2 oz)   SpO2 96%   BMI 31 13 kg/m²          Physical Exam   Constitutional: She appears well-developed and well-nourished  No distress  HENT:   Head: Normocephalic and atraumatic  Right Ear: External ear normal    Left Ear: External ear normal    Mouth/Throat: Oropharynx is clear and moist  No oropharyngeal exudate  Neck: Neck supple  Cardiovascular: Normal rate, regular rhythm and normal heart sounds  No murmur heard  Pulmonary/Chest: Effort normal and breath sounds normal  No respiratory distress  She has no wheezes  She has no rales  Lymphadenopathy:     She has no cervical adenopathy

## 2019-08-05 ENCOUNTER — OFFICE VISIT (OUTPATIENT)
Dept: FAMILY MEDICINE CLINIC | Facility: CLINIC | Age: 59
End: 2019-08-05
Payer: COMMERCIAL

## 2019-08-05 VITALS
DIASTOLIC BLOOD PRESSURE: 80 MMHG | TEMPERATURE: 98 F | HEIGHT: 62 IN | HEART RATE: 85 BPM | RESPIRATION RATE: 16 BRPM | BODY MASS INDEX: 32.09 KG/M2 | WEIGHT: 174.4 LBS | OXYGEN SATURATION: 96 % | SYSTOLIC BLOOD PRESSURE: 156 MMHG

## 2019-08-05 DIAGNOSIS — I10 ESSENTIAL HYPERTENSION: Primary | ICD-10-CM

## 2019-08-05 DIAGNOSIS — R60.0 BILATERAL LEG EDEMA: ICD-10-CM

## 2019-08-05 PROCEDURE — 99214 OFFICE O/P EST MOD 30 MIN: CPT | Performed by: PHYSICIAN ASSISTANT

## 2019-08-05 RX ORDER — IRBESARTAN 150 MG/1
150 TABLET ORAL
Qty: 90 TABLET | Refills: 1 | Status: SHIPPED | OUTPATIENT
Start: 2019-08-05 | End: 2019-08-19

## 2019-08-05 NOTE — PATIENT INSTRUCTIONS
Hold amlodipine which is likely the cause of the lower extremity edema  Continue hydrochlorothiazide 25 mg daily  Elevate legs above heart level to reduce swelling  Start Avapro 150 mg daily  Follow-up in 3-4 weeks to recheck blood pressure and leg swelling, sooner if symptoms increase

## 2019-08-05 NOTE — PROGRESS NOTES
Assessment/Plan:    Patient Instructions   Hold amlodipine which is likely the cause of the lower extremity edema  Continue hydrochlorothiazide 25 mg daily  Elevate legs above heart level to reduce swelling  Start Avapro 150 mg daily  Follow-up in 3-4 weeks to recheck blood pressure and leg swelling, sooner if symptoms increase    M*Modal software was used to dictate this note  It may contain errors with dictating incorrect words/spelling  Please contact provider directly for any questions  Diagnoses and all orders for this visit:    Essential hypertension  -     irbesartan (AVAPRO) 150 mg tablet; Take 1 tablet (150 mg total) by mouth daily at bedtime    Bilateral leg edema          Subjective:      Patient ID: Chase Biggs is a 63 y o  female  Patient presents today for acute visit for evaluation of bilateral leg she thinks may be due to 1 of her blood pressure medications  She is currently on hydrochlorothiazide 25 mg daily along with amlodipine 10 mg daily  She states the swelling started about a week ago  She tried stopping the hydrochlorothiazide with no improvement  She was concerned about discontinuing her blood pressure medications without being on anything  She denies any chest pain or shortness of breath  She does not monitor her blood pressure  She has noticed some redness of both of her legs also  The following portions of the patient's history were reviewed and updated as appropriate:   She  has a past medical history of Hypertension    She   Patient Active Problem List    Diagnosis Date Noted    Bilateral leg edema 08/05/2019    Seasonal allergic rhinitis due to pollen 07/09/2019    Obesity (BMI 30 0-34 9) 06/27/2019    Breast cancer screening 06/18/2019    Gait abnormality 03/21/2019    Chronic cough 03/21/2019    Cigarette nicotine dependence without complication 35/61/0688    Chronic right shoulder pain 03/05/2019    Encounter for hepatitis C virus screening test for high risk patient 03/05/2019    Colon cancer screening 03/05/2019    Lipid screening 03/05/2019    Essential hypertension 03/04/2019     She  has a past surgical history that includes Appendectomy  Her family history includes COPD in her mother  She  reports that she has been smoking cigarettes  She has been smoking about 0 50 packs per day  She has never used smokeless tobacco  She reports that she drinks about 7 0 standard drinks of alcohol per week  She reports that she has current or past drug history  Current Outpatient Medications   Medication Sig Dispense Refill    albuterol (VENTOLIN HFA) 90 mcg/act inhaler Inhale 2 puffs every 6 (six) hours as needed for wheezing 18 g 0    hydrochlorothiazide (HYDRODIURIL) 25 mg tablet Take 1 tablet (25 mg total) by mouth daily 90 tablet 1    irbesartan (AVAPRO) 150 mg tablet Take 1 tablet (150 mg total) by mouth daily at bedtime 90 tablet 1    loratadine (CLARITIN) 10 mg tablet Take 1 tablet (10 mg total) by mouth daily 90 tablet 1    montelukast (SINGULAIR) 10 mg tablet Take 1 tablet (10 mg total) by mouth daily at bedtime 90 tablet 1     No current facility-administered medications for this visit  Current Outpatient Medications on File Prior to Visit   Medication Sig    albuterol (VENTOLIN HFA) 90 mcg/act inhaler Inhale 2 puffs every 6 (six) hours as needed for wheezing    hydrochlorothiazide (HYDRODIURIL) 25 mg tablet Take 1 tablet (25 mg total) by mouth daily    loratadine (CLARITIN) 10 mg tablet Take 1 tablet (10 mg total) by mouth daily    montelukast (SINGULAIR) 10 mg tablet Take 1 tablet (10 mg total) by mouth daily at bedtime    [DISCONTINUED] amLODIPine (NORVASC) 10 mg tablet Take 1 tablet (10 mg total) by mouth daily     No current facility-administered medications on file prior to visit  She is allergic to amlodipine; lisinopril; and pollen extract       Review of Systems   Respiratory: Negative for shortness of breath  Cardiovascular: Positive for leg swelling  Negative for chest pain  Skin:        As stated in HPI         Objective:      /80 (BP Location: Left arm, Patient Position: Sitting, Cuff Size: Large)   Pulse 85   Temp 98 °F (36 7 °C) (Tympanic)   Resp 16   Ht 5' 2" (1 575 m)   Wt 79 1 kg (174 lb 6 4 oz)   SpO2 96%   BMI 31 90 kg/m²          Physical Exam   Constitutional: She appears well-developed and well-nourished  No distress  HENT:   Head: Normocephalic and atraumatic  Cardiovascular: Normal rate, regular rhythm and normal heart sounds  No murmur heard  Pulmonary/Chest: Effort normal and breath sounds normal  No respiratory distress  She has no wheezes  She has no rales  Musculoskeletal: She exhibits edema (1+ edema both lower extremities with mild erythema  )

## 2019-08-12 ENCOUNTER — OFFICE VISIT (OUTPATIENT)
Dept: URGENT CARE | Age: 59
End: 2019-08-12
Payer: COMMERCIAL

## 2019-08-12 VITALS
OXYGEN SATURATION: 95 % | DIASTOLIC BLOOD PRESSURE: 90 MMHG | BODY MASS INDEX: 31.36 KG/M2 | RESPIRATION RATE: 20 BRPM | WEIGHT: 170.4 LBS | HEIGHT: 62 IN | HEART RATE: 100 BPM | SYSTOLIC BLOOD PRESSURE: 182 MMHG | TEMPERATURE: 97.9 F

## 2019-08-12 DIAGNOSIS — B96.89 ACUTE BACTERIAL BRONCHITIS: Primary | ICD-10-CM

## 2019-08-12 DIAGNOSIS — J20.8 ACUTE BACTERIAL BRONCHITIS: Primary | ICD-10-CM

## 2019-08-12 PROCEDURE — 99213 OFFICE O/P EST LOW 20 MIN: CPT | Performed by: NURSE PRACTITIONER

## 2019-08-12 PROCEDURE — S9088 SERVICES PROVIDED IN URGENT: HCPCS | Performed by: NURSE PRACTITIONER

## 2019-08-12 RX ORDER — PREDNISONE 10 MG/1
TABLET ORAL
Qty: 21 TABLET | Refills: 0 | Status: SHIPPED | OUTPATIENT
Start: 2019-08-12 | End: 2019-08-19

## 2019-08-12 RX ORDER — AZITHROMYCIN 250 MG/1
TABLET, FILM COATED ORAL
Qty: 6 TABLET | Refills: 0 | Status: SHIPPED | OUTPATIENT
Start: 2019-08-12 | End: 2019-08-16

## 2019-08-12 RX ORDER — BENZONATATE 100 MG/1
100 CAPSULE ORAL 3 TIMES DAILY PRN
Qty: 20 CAPSULE | Refills: 0 | Status: SHIPPED | OUTPATIENT
Start: 2019-08-12 | End: 2019-08-19

## 2019-08-12 NOTE — PROGRESS NOTES
NAME: Cali Regan is a 63 y o  female  : 1960    MRN: [id number]      Assessment and Plan   Acute bacterial bronchitis [J20 8, B96 89]  1  Acute bacterial bronchitis  azithromycin (ZITHROMAX) 250 mg tablet    predniSONE 10 mg tablet    benzonatate (TESSALON PERLES) 100 mg capsule       Jluis Negron was seen today for cough  Diagnoses and all orders for this visit:    Acute bacterial bronchitis  -     azithromycin (ZITHROMAX) 250 mg tablet; Take 2 tablets today and only 1 pill daily until finished  -     predniSONE 10 mg tablet; Take 6 tablets today and decrease by one tablet daily until gone  -     benzonatate (TESSALON PERLES) 100 mg capsule; Take 1 capsule (100 mg total) by mouth 3 (three) times a day as needed for cough        Patient Instructions   Patient Instructions     Take meds as directed   Take prednisone as directed  Follow up with pcp   If worse go to the ER   Take flonase daily   Use zyrtec or allegra daily and take your normal meds daily    We offer over-the-counter meds that can be purchased here at the office for your convenience   Cough and cold meds:   Mucinex for $14 00   Mucinex DM for $14 00   Delsym for $14 dollars   Cepacol Extra strength for $4 00,     Allergy medicine  Bendaryl for $4 00  Cetrizine (Zyrtec) for $4 00,     Pain relief  Ibuprofen (motrin) for $4 00  Acetaminophen (tylenol) for $4 00  Childrens tylenol and motrin for $4 00    Itch and Rash Relief  % Hydrocortisone Cream for $4 00      Acute Bronchitis   WHAT YOU NEED TO KNOW:   Acute bronchitis is swelling and irritation in the air passages of your lungs  This irritation may cause you to cough or have other breathing problems  Acute bronchitis often starts because of another illness, such as a cold or the flu  The illness spreads from your nose and throat to your windpipe and airways  Bronchitis is often called a chest cold  Acute bronchitis lasts about 3 to 6 weeks and is usually not a serious illness   Your cough can last for several weeks  DISCHARGE INSTRUCTIONS:   Return to the emergency department if:   · You cough up blood  · Your lips or fingernails turn blue  · You feel like you are not getting enough air when you breathe  Contact your healthcare provider if:   · You have a fever  · Your breathing problems do not go away or get worse  · Your cough does not get better within 4 weeks  · You have questions or concerns about your condition or care  Self-care:   · Get more rest   Rest helps your body to heal  Slowly start to do more each day  Rest when you feel it is needed  · Avoid irritants in the air  Avoid chemicals, fumes, and dust  Wear a face mask if you must work around dust or fumes  Stay inside on days when air pollution levels are high  If you have allergies, stay inside when pollen counts are high  Do not use aerosol products, such as spray-on deodorant, bug spray, and hair spray  · Do not smoke or be around others who smoke  Nicotine and other chemicals in cigarettes and cigars damages the cilia that move mucus out of your lungs  Ask your healthcare provider for information if you currently smoke and need help to quit  E-cigarettes or smokeless tobacco still contain nicotine  Talk to your healthcare provider before you use these products  · Drink liquids as directed  Liquids help keep your air passages moist and help you cough up mucus  You may need to drink more liquids when you have acute bronchitis  Ask how much liquid to drink each day and which liquids are best for you  · Use a humidifier or vaporizer  Use a cool mist humidifier or a vaporizer to increase air moisture in your home  This may make it easier for you to breathe and help decrease your cough  Decrease risk for acute bronchitis:   · Get the vaccinations you need  Ask your healthcare provider if you should get vaccinated against the flu or pneumonia  · Prevent the spread of germs    You can decrease your risk of acute bronchitis and other illnesses by doing the following:     [facility] your hands often with soap and water  Carry germ-killing hand lotion or gel with you  You can use the lotion or gel to clean your hands when soap and water are not available  ¨ Do not touch your eyes, nose, or mouth unless you have washed your hands first     ¨ Always cover your mouth when you cough to prevent the spread of germs  It is best to cough into a tissue or your shirt sleeve instead of into your hand  Ask those around you cover their mouths when they cough  ¨ Try to avoid people who have a cold or the flu  If you are sick, stay away from others as much as possible  Medicines: Your healthcare provider may  give you any of the following:  · Ibuprofen or acetaminophen  are medicines that help lower your fever  They are available without a doctor's order  Ask your healthcare provider which medicine is right for you  Ask how much to take and how often to take it  Follow directions  These medicines can cause stomach bleeding if not taken correctly  Ibuprofen can cause kidney damage  Do not take ibuprofen if you have kidney disease, an ulcer, or allergies to aspirin  Acetaminophen can cause liver damage  Do not take more than 4,000 milligrams in 24 hours  · Decongestants  help loosen mucus in your lungs and make it easier to cough up  This can help you breathe easier  · Cough suppressants  decrease your urge to cough  If your cough produces mucus, do not take a cough suppressant unless your healthcare provider tells you to  Your healthcare provider may suggest that you take a cough suppressant at night so you can rest     · Inhalers  may be given  Your healthcare provider may give you one or more inhalers to help you breathe easier and cough less  An inhaler gives your medicine to open your airways  Ask your healthcare provider to show you how to use your inhaler correctly  · Take your medicine as directed  Contact your healthcare provider if you think your medicine is not helping or if you have side effects  Tell him of her if you are allergic to any medicine  Keep a list of the medicines, vitamins, and herbs you take  Include the amounts, and when and why you take them  Bring the list or the pill bottles to follow-up visits  Carry your medicine list with you in case of an emergency  Follow up with your healthcare provider as directed:  Write down questions you have so you will remember to ask them during your follow-up visits  © 2017 [employer] Information is for End User's use only and may not be sold, redistributed or otherwise used for commercial purposes  All illustrations and images included in CareNotes® are the copyrighted property of A D [employer] M , Inc  or [employer]  The above information is an  only  It is not intended as medical advice for individual conditions or treatments  Talk to your doctor, nurse or pharmacist before following any medical regimen to see if it is safe and effective for you  Encouraged to stop smoking    Proceed to ER if symptoms worsen  Chief Complaint     Chief Complaint   Patient presents with    Cough     Pt presents with a four day hx of chest tightness, cough, head congestion and back pain  Using rescue inhaler, taking Singular and cough drops  Denies fevers, chills, body aches  History of Present Illness     Pt here today for chest congestion and taking nothing over the counter at this time    URI    This is a new problem  The current episode started in the past 7 days (5 days)  The problem has been gradually worsening  There has been no fever  Associated symptoms include chest pain (tightness), congestion (chest), coughing (thick mucus sputum), headaches, sinus pain (tight feeling) and a sore throat (raspy)  Pertinent negatives include no dysuria, ear pain, nausea, neck pain or vomiting   She has tried acetaminophen for the symptoms  The treatment provided no relief  Review of Systems   Review of Systems   Constitutional: Negative for fever  HENT: Positive for congestion (chest), postnasal drip, sinus pain (tight feeling) and sore throat (raspy)  Negative for ear pain  Eyes: Negative  Respiratory: Positive for cough (thick mucus sputum) and chest tightness  Negative for shortness of breath  Cardiovascular: Positive for chest pain (tightness)  Gastrointestinal: Negative  Negative for nausea and vomiting  Genitourinary: Negative for dysuria  Musculoskeletal: Negative  Negative for neck pain  Neurological: Positive for headaches           Current Medications       Current Outpatient Medications:     albuterol (VENTOLIN HFA) 90 mcg/act inhaler, Inhale 2 puffs every 6 (six) hours as needed for wheezing, Disp: 18 g, Rfl: 0    azithromycin (ZITHROMAX) 250 mg tablet, Take 2 tablets today and only 1 pill daily until finished , Disp: 6 tablet, Rfl: 0    benzonatate (TESSALON PERLES) 100 mg capsule, Take 1 capsule (100 mg total) by mouth 3 (three) times a day as needed for cough, Disp: 20 capsule, Rfl: 0    hydrochlorothiazide (HYDRODIURIL) 25 mg tablet, Take 1 tablet (25 mg total) by mouth daily, Disp: 90 tablet, Rfl: 1    irbesartan (AVAPRO) 150 mg tablet, Take 1 tablet (150 mg total) by mouth daily at bedtime, Disp: 90 tablet, Rfl: 1    loratadine (CLARITIN) 10 mg tablet, Take 1 tablet (10 mg total) by mouth daily, Disp: 90 tablet, Rfl: 1    montelukast (SINGULAIR) 10 mg tablet, Take 1 tablet (10 mg total) by mouth daily at bedtime, Disp: 90 tablet, Rfl: 1    predniSONE 10 mg tablet, Take 6 tablets today and decrease by one tablet daily until gone, Disp: 21 tablet, Rfl: 0    Current Allergies     Allergies as of 08/12/2019 - Reviewed 08/05/2019   Allergen Reaction Noted    Amlodipine Edema 08/05/2019    Lisinopril Cough 07/09/2019    Pollen extract  04/04/2015              Past Medical History: Diagnosis Date    Hypertension        Past Surgical History:   Procedure Laterality Date    APPENDECTOMY      APPENDISITIS SURGERY AGE 26       Family History   Problem Relation Age of Onset    COPD Mother          Medications have been verified  The following portions of the patient's history were reviewed and updated as appropriate: allergies, current medications, past family history, past medical history, past social history, past surgical history and problem list     Objective   BP (!) 182/90   Pulse 100   Temp 97 9 °F (36 6 °C)   Resp 20   Ht 5' 2" (1 575 m)   Wt 77 3 kg (170 lb 6 4 oz)   SpO2 95%   BMI 31 17 kg/m²      Aware  Of elevated BP, takes BP meds at night and did take her meds   Rechecked pressure and 172/90     Physical Exam     Physical Exam   Constitutional: She is oriented to person, place, and time  She appears well-developed and well-nourished  She is cooperative  HENT:   Right Ear: Hearing, external ear and ear canal normal    Left Ear: Hearing, external ear and ear canal normal    Nose: Mucosal edema present  Right sinus exhibits maxillary sinus tenderness  Left sinus exhibits maxillary sinus tenderness  Mouth/Throat: Uvula is midline and mucous membranes are normal  She has dentures  Abnormal dentition  Posterior oropharyngeal edema and posterior oropharyngeal erythema present  No oropharyngeal exudate  Tonsils are 0 on the right  Tonsils are 0 on the left  No tonsillar exudate  Tympanic membranes are Dull to light reflex  Turbinates and the nares are red and swollen   Neck: Normal range of motion  No thyroid mass present  Cardiovascular: Normal rate, regular rhythm and normal heart sounds  Pulmonary/Chest: Effort normal  She has decreased breath sounds in the right middle field, the right lower field, the left middle field and the left lower field  She has wheezes in the right middle field and the left middle field  She has rhonchi     Lymphadenopathy:     She has no cervical adenopathy  Neurological: She is alert and oriented to person, place, and time         PURVI Handy

## 2019-08-12 NOTE — PATIENT INSTRUCTIONS
Take meds as directed   Take prednisone as directed  Follow up with pcp   If worse go to the ER   Take flonase daily   Use zyrtec or allegra daily and take your normal meds daily    We offer over-the-counter meds that can be purchased here at the office for your convenience   Cough and cold meds:   Mucinex for $14 00   Mucinex DM for $14 00   Delsym for $14 dollars   Cepacol Extra strength for $4 00,     Allergy medicine  Bendaryl for $4 00  Cetrizine (Zyrtec) for $4 00,     Pain relief  Ibuprofen (motrin) for $4 00  Acetaminophen (tylenol) for $4 00  Childrens tylenol and motrin for $4 00    Itch and Rash Relief  % Hydrocortisone Cream for $4 00      Acute Bronchitis   WHAT YOU NEED TO KNOW:   Acute bronchitis is swelling and irritation in the air passages of your lungs  This irritation may cause you to cough or have other breathing problems  Acute bronchitis often starts because of another illness, such as a cold or the flu  The illness spreads from your nose and throat to your windpipe and airways  Bronchitis is often called a chest cold  Acute bronchitis lasts about 3 to 6 weeks and is usually not a serious illness  Your cough can last for several weeks  DISCHARGE INSTRUCTIONS:   Return to the emergency department if:   · You cough up blood  · Your lips or fingernails turn blue  · You feel like you are not getting enough air when you breathe  Contact your healthcare provider if:   · You have a fever  · Your breathing problems do not go away or get worse  · Your cough does not get better within 4 weeks  · You have questions or concerns about your condition or care  Self-care:   · Get more rest   Rest helps your body to heal  Slowly start to do more each day  Rest when you feel it is needed  · Avoid irritants in the air  Avoid chemicals, fumes, and dust  Wear a face mask if you must work around dust or fumes  Stay inside on days when air pollution levels are high   If you have allergies, stay inside when pollen counts are high  Do not use aerosol products, such as spray-on deodorant, bug spray, and hair spray  · Do not smoke or be around others who smoke  Nicotine and other chemicals in cigarettes and cigars damages the cilia that move mucus out of your lungs  Ask your healthcare provider for information if you currently smoke and need help to quit  E-cigarettes or smokeless tobacco still contain nicotine  Talk to your healthcare provider before you use these products  · Drink liquids as directed  Liquids help keep your air passages moist and help you cough up mucus  You may need to drink more liquids when you have acute bronchitis  Ask how much liquid to drink each day and which liquids are best for you  · Use a humidifier or vaporizer  Use a cool mist humidifier or a vaporizer to increase air moisture in your home  This may make it easier for you to breathe and help decrease your cough  Decrease risk for acute bronchitis:   · Get the vaccinations you need  Ask your healthcare provider if you should get vaccinated against the flu or pneumonia  · Prevent the spread of germs  You can decrease your risk of acute bronchitis and other illnesses by doing the following:     [facility] your hands often with soap and water  Carry germ-killing hand lotion or gel with you  You can use the lotion or gel to clean your hands when soap and water are not available  ¨ Do not touch your eyes, nose, or mouth unless you have washed your hands first     ¨ Always cover your mouth when you cough to prevent the spread of germs  It is best to cough into a tissue or your shirt sleeve instead of into your hand  Ask those around you cover their mouths when they cough  ¨ Try to avoid people who have a cold or the flu  If you are sick, stay away from others as much as possible  Medicines:   Your healthcare provider may  give you any of the following:  · Ibuprofen or acetaminophen  are medicines that help lower your fever  They are available without a doctor's order  Ask your healthcare provider which medicine is right for you  Ask how much to take and how often to take it  Follow directions  These medicines can cause stomach bleeding if not taken correctly  Ibuprofen can cause kidney damage  Do not take ibuprofen if you have kidney disease, an ulcer, or allergies to aspirin  Acetaminophen can cause liver damage  Do not take more than 4,000 milligrams in 24 hours  · Decongestants  help loosen mucus in your lungs and make it easier to cough up  This can help you breathe easier  · Cough suppressants  decrease your urge to cough  If your cough produces mucus, do not take a cough suppressant unless your healthcare provider tells you to  Your healthcare provider may suggest that you take a cough suppressant at night so you can rest     · Inhalers  may be given  Your healthcare provider may give you one or more inhalers to help you breathe easier and cough less  An inhaler gives your medicine to open your airways  Ask your healthcare provider to show you how to use your inhaler correctly  · Take your medicine as directed  Contact your healthcare provider if you think your medicine is not helping or if you have side effects  Tell him of her if you are allergic to any medicine  Keep a list of the medicines, vitamins, and herbs you take  Include the amounts, and when and why you take them  Bring the list or the pill bottles to follow-up visits  Carry your medicine list with you in case of an emergency  Follow up with your healthcare provider as directed:  Write down questions you have so you will remember to ask them during your follow-up visits  © 2017 [employer] [employer]  Information is for End User's use only and may not be sold, redistributed or otherwise used for commercial purposes   All illustrations and images included in CareNotes® are the copyrighted property of [employer] M [employer] Inc  or [employer]  The above information is an  only  It is not intended as medical advice for individual conditions or treatments  Talk to your doctor, nurse or pharmacist before following any medical regimen to see if it is safe and effective for you        Encouraged to stop smoking

## 2019-08-19 ENCOUNTER — HOSPITAL ENCOUNTER (OUTPATIENT)
Dept: RADIOLOGY | Facility: IMAGING CENTER | Age: 59
Discharge: HOME | End: 2019-08-19
Payer: COMMERCIAL

## 2019-08-19 ENCOUNTER — OFFICE VISIT (OUTPATIENT)
Dept: FAMILY MEDICINE CLINIC | Facility: CLINIC | Age: 59
End: 2019-08-19
Payer: COMMERCIAL

## 2019-08-19 VITALS
DIASTOLIC BLOOD PRESSURE: 100 MMHG | WEIGHT: 172.6 LBS | BODY MASS INDEX: 31.76 KG/M2 | RESPIRATION RATE: 16 BRPM | OXYGEN SATURATION: 95 % | TEMPERATURE: 99.2 F | SYSTOLIC BLOOD PRESSURE: 200 MMHG | HEIGHT: 62 IN | HEART RATE: 105 BPM

## 2019-08-19 DIAGNOSIS — J18.9 PNEUMONIA OF BOTH LUNGS DUE TO INFECTIOUS ORGANISM, UNSPECIFIED PART OF LUNG: ICD-10-CM

## 2019-08-19 DIAGNOSIS — J18.9 PNEUMONIA OF BOTH LUNGS DUE TO INFECTIOUS ORGANISM, UNSPECIFIED PART OF LUNG: Primary | ICD-10-CM

## 2019-08-19 DIAGNOSIS — R05.3 CHRONIC COUGH: ICD-10-CM

## 2019-08-19 DIAGNOSIS — I10 ESSENTIAL HYPERTENSION: ICD-10-CM

## 2019-08-19 PROCEDURE — 99214 OFFICE O/P EST MOD 30 MIN: CPT

## 2019-08-19 PROCEDURE — 71046 X-RAY EXAM CHEST 2 VIEWS: CPT

## 2019-08-19 RX ORDER — PREDNISONE 10 MG/1
TABLET ORAL
Qty: 30 TABLET | Refills: 0 | Status: SHIPPED | OUTPATIENT
Start: 2019-08-19 | End: 2019-08-23 | Stop reason: SDUPTHER

## 2019-08-19 RX ORDER — PREDNISONE 10 MG/1
TABLET ORAL
Qty: 30 TABLET | Refills: 0 | Status: SHIPPED | OUTPATIENT
Start: 2019-08-19 | End: 2019-08-19

## 2019-08-19 RX ORDER — IPRATROPIUM BROMIDE AND ALBUTEROL SULFATE 2.5; .5 MG/3ML; MG/3ML
3 SOLUTION RESPIRATORY (INHALATION)
Status: DISCONTINUED | OUTPATIENT
Start: 2019-08-19 | End: 2019-08-19

## 2019-08-19 RX ORDER — AZITHROMYCIN 1 G
1 PACKET (EA) ORAL ONCE
COMMUNITY
End: 2019-09-13

## 2019-08-19 RX ORDER — DOXYCYCLINE HYCLATE 100 MG/1
100 CAPSULE ORAL EVERY 12 HOURS SCHEDULED
Qty: 20 CAPSULE | Refills: 0 | Status: SHIPPED | OUTPATIENT
Start: 2019-08-19 | End: 2019-08-29

## 2019-08-19 RX ORDER — IRBESARTAN 300 MG/1
300 TABLET ORAL
Qty: 30 TABLET | Refills: 1 | Status: SHIPPED | OUTPATIENT
Start: 2019-08-19 | End: 2020-01-14 | Stop reason: SDUPTHER

## 2019-08-19 RX ORDER — PREDNISONE 10 MG/1
TABLET ORAL
Qty: 30 TABLET | Refills: 0 | Status: SHIPPED | OUTPATIENT
Start: 2019-08-19 | End: 2019-08-19 | Stop reason: SDUPTHER

## 2019-08-19 RX ORDER — IPRATROPIUM BROMIDE AND ALBUTEROL SULFATE 2.5; .5 MG/3ML; MG/3ML
3 SOLUTION RESPIRATORY (INHALATION) ONCE
Status: COMPLETED | OUTPATIENT
Start: 2019-08-19 | End: 2019-08-20

## 2019-08-19 RX ORDER — ALBUTEROL SULFATE 90 UG/1
2 AEROSOL, METERED RESPIRATORY (INHALATION) EVERY 6 HOURS PRN
Qty: 18 G | Refills: 0 | Status: SHIPPED | OUTPATIENT
Start: 2019-08-19 | End: 2021-08-30 | Stop reason: SDUPTHER

## 2019-08-19 RX ADMIN — IPRATROPIUM BROMIDE AND ALBUTEROL SULFATE 3 ML: 2.5; .5 SOLUTION RESPIRATORY (INHALATION) at 16:46

## 2019-08-19 NOTE — ASSESSMENT & PLAN NOTE
Breath sounds decreased throughout  Cough is moist and harsh  She has completed her course of azithromycin and prednisone without any improvement  States that the inhaler is not helping  Will give stat dose of DuoNeb, restart prednisone at a higher dose per day and for longer period of time and renew the albuterol  She is to return on Friday for re-evaluation and get a chest x-ray  Lung sounds improved after DuoNeb treatment and she states feeling less tight

## 2019-08-19 NOTE — PROGRESS NOTES
Assessment/Plan:     Pneumonia of both lungs due to infectious organism  Breath sounds decreased throughout  Cough is moist and harsh  She has completed her course of azithromycin and prednisone without any improvement  States that the inhaler is not helping  Will give stat dose of DuoNeb, restart prednisone at a higher dose per day and for longer period of time and renew the albuterol  She is to return on Friday for re-evaluation and get a chest x-ray  Lung sounds improved after DuoNeb treatment and she states feeling less tight  Essential hypertension  Patient started Avapro 150 mg 2 weeks ago but blood pressure remains elevated  Will give Catapres 0 1 mg today and increase Avapro to 300 mg daily  She is to have a BP recheck on Friday       Diagnoses and all orders for this visit:    Pneumonia of both lungs due to infectious organism, unspecified part of lung  -     XR chest pa & lateral; Future  -     doxycycline hyclate (VIBRAMYCIN) 100 mg capsule; Take 1 capsule (100 mg total) by mouth every 12 (twelve) hours for 10 days  -     Discontinue: predniSONE 10 mg tablet; Take 4 tabs daily for three days, then take 30 tabs daily for three days, then take 2 tabs daily for three days, then take 1 tab daily for three day  -     Discontinue: ipratropium-albuterol (DUO-NEB) 0 5-2 5 mg/3 mL inhalation solution 3 mL  -     Discontinue: predniSONE 10 mg tablet; Take 4 tabs daily for three days, then take 30 tabs daily for three days, then take 2 tabs daily for three days, then take 1 tab daily for three day  -     predniSONE 10 mg tablet; Take 4 tabs daily for three days, then take 30 tabs daily for three days, then take 2 tabs daily for three days, then take 1 tab daily for three day  -     ipratropium-albuterol (DUO-NEB) 0 5-2 5 mg/3 mL inhalation solution 3 mL    Essential hypertension  -     irbesartan (AVAPRO) 300 mg tablet;  Take 1 tablet (300 mg total) by mouth daily at bedtime    Chronic cough  - albuterol (VENTOLIN HFA) 90 mcg/act inhaler; Inhale 2 puffs every 6 (six) hours as needed for wheezing    Other orders  -     azithromycin (ZITHROMAX) 1 g powder; Take 1 packet by mouth once          Subjective:     Patient ID: Charan Hood is a 63 y o  female  HPI  Presents today with bronchitis  She was seen in urgent care and was treated but there has been no improvement  Review of Systems   Constitutional: Positive for fatigue  HENT: Negative  Eyes: Negative  Respiratory: Positive for cough, chest tightness and shortness of breath  Cardiovascular: Negative  Gastrointestinal: Negative  Skin: Negative  Neurological: Negative  Hematological: Negative  Psychiatric/Behavioral: Negative  Objective:     Physical Exam   Constitutional: She is oriented to person, place, and time  She appears well-developed and well-nourished  HENT:   Head: Normocephalic  Right Ear: External ear normal    Left Ear: External ear normal    Nose: Nose normal    Mouth/Throat: Oropharynx is clear and moist    Eyes: Conjunctivae and EOM are normal    Neck: Normal range of motion  Neck supple  Cardiovascular: Normal heart sounds  Tachycardia present  Pulmonary/Chest: Effort normal  She has decreased breath sounds in the right upper field, the right middle field, the right lower field, the left upper field, the left middle field and the left lower field  Abdominal: Soft  Bowel sounds are normal    Neurological: She is alert and oriented to person, place, and time  Skin: Skin is warm and dry  Psychiatric: She has a normal mood and affect   Her behavior is normal  Judgment and thought content normal

## 2019-08-19 NOTE — ASSESSMENT & PLAN NOTE
Patient started Avapro 150 mg 2 weeks ago but blood pressure remains elevated  Will give Catapres 0 1 mg today and increase Avapro to 300 mg daily    She is to have a BP recheck on Friday

## 2019-08-19 NOTE — LETTER
August 19, 2019     Patient: Cheikh James   YOB: 1960   Date of Visit: 8/19/2019       To Whom it May Concern:    Baltazarmaritza Alvarado is under my professional care  She was seen in my office on 8/19/2019  She may return to work on August 26 2019 if cleared on Friday August 23 2019       If you have any questions or concerns, please don't hesitate to call           Sincerely,          PURVI Daniels        CC: No Recipients

## 2019-08-20 RX ADMIN — IPRATROPIUM BROMIDE AND ALBUTEROL SULFATE 3 ML: 2.5; .5 SOLUTION RESPIRATORY (INHALATION) at 10:30

## 2019-08-23 ENCOUNTER — OFFICE VISIT (OUTPATIENT)
Dept: FAMILY MEDICINE CLINIC | Facility: CLINIC | Age: 59
End: 2019-08-23
Payer: COMMERCIAL

## 2019-08-23 VITALS
DIASTOLIC BLOOD PRESSURE: 102 MMHG | TEMPERATURE: 98.8 F | OXYGEN SATURATION: 97 % | SYSTOLIC BLOOD PRESSURE: 200 MMHG | RESPIRATION RATE: 16 BRPM | BODY MASS INDEX: 31.47 KG/M2 | HEIGHT: 62 IN | WEIGHT: 171 LBS | HEART RATE: 104 BPM

## 2019-08-23 DIAGNOSIS — J18.9 PNEUMONIA OF BOTH LUNGS DUE TO INFECTIOUS ORGANISM, UNSPECIFIED PART OF LUNG: ICD-10-CM

## 2019-08-23 DIAGNOSIS — Z13.220 LIPID SCREENING: ICD-10-CM

## 2019-08-23 DIAGNOSIS — I10 ESSENTIAL HYPERTENSION: Primary | ICD-10-CM

## 2019-08-23 PROCEDURE — 99214 OFFICE O/P EST MOD 30 MIN: CPT | Performed by: NURSE PRACTITIONER

## 2019-08-23 PROCEDURE — 3008F BODY MASS INDEX DOCD: CPT | Performed by: NURSE PRACTITIONER

## 2019-08-23 RX ORDER — PREDNISONE 10 MG/1
TABLET ORAL
Qty: 30 TABLET | Refills: 0 | Status: SHIPPED | OUTPATIENT
Start: 2019-08-23 | End: 2019-08-23 | Stop reason: SDUPTHER

## 2019-08-23 RX ORDER — PREDNISONE 10 MG/1
TABLET ORAL
Qty: 30 TABLET | Refills: 0 | Status: SHIPPED | OUTPATIENT
Start: 2019-08-23 | End: 2019-09-13

## 2019-08-23 NOTE — PROGRESS NOTES
Assessment/Plan:     Pneumonia of both lungs due to infectious organism  Improved  Instructed to con't with abx and prednisone  Instructed to eat healthy and rest to regain strength  Essential hypertension  Still uncontrolled with increased dose of Avapro  She admits to not taking it routinely and not taking her HCTZ  Instructed she must take the both medication daily and eat a low salt diet  Will see back in one week  Also instructed to get her labs done before her next visit  Diagnoses and all orders for this visit:    Essential hypertension  -     Magnesium; Future    Lipid screening  -     Comprehensive metabolic panel; Future  -     Lipid Panel with Direct LDL reflex; Future    Pneumonia of both lungs due to infectious organism, unspecified part of lung  -     Discontinue: predniSONE 10 mg tablet; Take 4 tabs daily for three days, then take 30 tabs daily for three days, then take 2 tabs daily for three days, then take 1 tab daily for three day  -     predniSONE 10 mg tablet; Take 4 tabs daily for three days, then take 30 tabs daily for three days, then take 2 tabs daily for three days, then take 1 tab daily for three day  Subjective:     Patient ID: Cheikh James is a 63 y o  female  HPI  Presents today for f/u for BP medication adjustment  She states she forgets to take it at times  She is taking her abx and is feeling a little better but is very tired and con't with a deep cough  Review of Systems   Constitutional: Positive for fatigue  Respiratory: Positive for cough and shortness of breath  Cardiovascular: Negative  Gastrointestinal: Negative  Skin: Negative  Allergic/Immunologic: Negative  Neurological: Negative  Psychiatric/Behavioral: Negative  Objective:     Physical Exam   Constitutional: She is oriented to person, place, and time  She appears well-developed and well-nourished  HENT:   Head: Normocephalic     Eyes: Conjunctivae and EOM are normal    Neck: Normal range of motion  Neck supple  Cardiovascular: Normal rate, regular rhythm and normal heart sounds  Pulmonary/Chest: Effort normal and breath sounds normal    Abdominal: Soft  Bowel sounds are normal    Neurological: She is alert and oriented to person, place, and time  Skin: Skin is warm and dry  Psychiatric: She has a normal mood and affect   Her behavior is normal  Judgment and thought content normal

## 2019-08-23 NOTE — LETTER
August 23, 2019     Patient: Criss Severino   YOB: 1960   Date of Visit: 8/23/2019       To Whom it May Concern:    Gonzalo Langley is under my professional care  She was seen in my office on 8/23/2019  She may return to work on 9/3/19  If you have any questions or concerns, please don't hesitate to call           Sincerely,          PURVI Burton        CC: No Recipients

## 2019-08-24 NOTE — ASSESSMENT & PLAN NOTE
Still uncontrolled with increased dose of Avapro  She admits to not taking it routinely and not taking her HCTZ  Instructed she must take the both medication daily and eat a low salt diet  Will see back in one week  Also instructed to get her labs done before her next visit

## 2019-08-24 NOTE — ASSESSMENT & PLAN NOTE
Improved  Instructed to con't with abx and prednisone  Instructed to eat healthy and rest to regain strength

## 2019-08-30 ENCOUNTER — OFFICE VISIT (OUTPATIENT)
Dept: FAMILY MEDICINE CLINIC | Facility: CLINIC | Age: 59
End: 2019-08-30
Payer: COMMERCIAL

## 2019-08-30 VITALS
SYSTOLIC BLOOD PRESSURE: 210 MMHG | TEMPERATURE: 98.7 F | HEIGHT: 62 IN | HEART RATE: 92 BPM | WEIGHT: 171 LBS | BODY MASS INDEX: 31.47 KG/M2 | OXYGEN SATURATION: 97 % | DIASTOLIC BLOOD PRESSURE: 104 MMHG | RESPIRATION RATE: 16 BRPM

## 2019-08-30 DIAGNOSIS — I10 ESSENTIAL HYPERTENSION: ICD-10-CM

## 2019-08-30 DIAGNOSIS — J18.9 PNEUMONIA OF BOTH LUNGS DUE TO INFECTIOUS ORGANISM, UNSPECIFIED PART OF LUNG: Primary | ICD-10-CM

## 2019-08-30 PROCEDURE — 99214 OFFICE O/P EST MOD 30 MIN: CPT | Performed by: NURSE PRACTITIONER

## 2019-08-30 NOTE — ASSESSMENT & PLAN NOTE
Repeat BP is 210/98  Patient stopped taking the with HCTZ 2nd to side effects  She continued to take Avapro 150 mg although 300 was ordered at last appointment  Again, discussed to take Avapro 300 mg daily  She is to have a BP check next week by the nurses  Reminded she must stick to a strict low-salt diet    Follow-up 1 month

## 2019-08-30 NOTE — ASSESSMENT & PLAN NOTE
Pneumonia greatly improved with mild right-sided rales noted  Will allow patient to return back to work as of Monday

## 2019-08-30 NOTE — PROGRESS NOTES
Assessment/Plan:     Essential hypertension  Repeat BP is 210/98  Patient stopped taking the with HCTZ 2nd to side effects  She continued to take Avapro 150 mg although 300 was ordered at last appointment  Again, discussed to take Avapro 300 mg daily  She is to have a BP check next week by the nurses  Reminded she must stick to a strict low-salt diet  Follow-up 1 month    Pneumonia of both lungs due to infectious organism  Pneumonia greatly improved with mild right-sided rales noted  Will allow patient to return back to work as of Monday  Diagnoses and all orders for this visit:    Pneumonia of both lungs due to infectious organism, unspecified part of lung    Essential hypertension          Subjective:     Patient ID: Giovanny Evans is a 63 y o  female  HPI  Presents today for f/u of pneumonia and HTN  She is feeling better, hardly coughing, voice is better and she is able to sleep thru the night  She did not take the Avapro 300mg but con't with 150mg and stopped the HCTZ due to a reaction  Review of Systems   Constitutional: Negative  HENT: Negative  Eyes: Negative  Respiratory: Positive for cough  Cardiovascular: Negative  Gastrointestinal: Negative  Genitourinary: Negative  Skin: Negative  Allergic/Immunologic: Negative  Neurological: Negative  Psychiatric/Behavioral: Negative  Objective:     Physical Exam   Constitutional: She is oriented to person, place, and time  She appears well-developed and well-nourished  HENT:   Head: Normocephalic  Right Ear: External ear normal    Left Ear: External ear normal    Nose: Nose normal    Mouth/Throat: Oropharynx is clear and moist    Eyes: Conjunctivae and EOM are normal    Neck: Normal range of motion  Neck supple  Cardiovascular: Normal rate, regular rhythm, normal heart sounds and intact distal pulses  Pulmonary/Chest: Effort normal and breath sounds normal    Abdominal: Soft   Bowel sounds are normal    Musculoskeletal: Normal range of motion  Neurological: She is alert and oriented to person, place, and time  She has normal reflexes  Skin: Skin is warm and dry  Psychiatric: She has a normal mood and affect   Her behavior is normal  Judgment and thought content normal

## 2019-08-30 NOTE — LETTER
August 30, 2019     Patient: Paris Siegel   YOB: 1960   Date of Visit: 8/30/2019       To Whom it May Concern:    Kathrin Carltonalberto is under my professional care  She was seen in my office on 8/30/2019  She may return to work on 9/2/2019  If you have any questions or concerns, please don't hesitate to call           Sincerely,          PURVI Root

## 2019-09-09 ENCOUNTER — APPOINTMENT (OUTPATIENT)
Dept: LAB | Facility: IMAGING CENTER | Age: 59
End: 2019-09-09
Payer: COMMERCIAL

## 2019-09-09 DIAGNOSIS — I10 ESSENTIAL HYPERTENSION: ICD-10-CM

## 2019-09-09 DIAGNOSIS — Z91.89 ENCOUNTER FOR HEPATITIS C VIRUS SCREENING TEST FOR HIGH RISK PATIENT: ICD-10-CM

## 2019-09-09 DIAGNOSIS — Z11.59 ENCOUNTER FOR HEPATITIS C VIRUS SCREENING TEST FOR HIGH RISK PATIENT: ICD-10-CM

## 2019-09-09 DIAGNOSIS — Z13.220 LIPID SCREENING: ICD-10-CM

## 2019-09-09 LAB
ALBUMIN SERPL BCP-MCNC: 3.8 G/DL (ref 3.5–5)
ALP SERPL-CCNC: 71 U/L (ref 46–116)
ALT SERPL W P-5'-P-CCNC: 33 U/L (ref 12–78)
ANION GAP SERPL CALCULATED.3IONS-SCNC: 8 MMOL/L (ref 4–13)
AST SERPL W P-5'-P-CCNC: 28 U/L (ref 5–45)
BILIRUB SERPL-MCNC: 0.25 MG/DL (ref 0.2–1)
BUN SERPL-MCNC: 14 MG/DL (ref 5–25)
CALCIUM SERPL-MCNC: 9 MG/DL (ref 8.3–10.1)
CHLORIDE SERPL-SCNC: 91 MMOL/L (ref 100–108)
CHOLEST SERPL-MCNC: 234 MG/DL (ref 50–200)
CO2 SERPL-SCNC: 30 MMOL/L (ref 21–32)
CREAT SERPL-MCNC: 0.86 MG/DL (ref 0.6–1.3)
GFR SERPL CREATININE-BSD FRML MDRD: 75 ML/MIN/1.73SQ M
GLUCOSE P FAST SERPL-MCNC: 85 MG/DL (ref 65–99)
HCV AB SER QL: NORMAL
HDLC SERPL-MCNC: 90 MG/DL (ref 40–60)
LDLC SERPL CALC-MCNC: 83 MG/DL (ref 0–100)
MAGNESIUM SERPL-MCNC: 2.1 MG/DL (ref 1.6–2.6)
POTASSIUM SERPL-SCNC: 3.4 MMOL/L (ref 3.5–5.3)
PROT SERPL-MCNC: 7.4 G/DL (ref 6.4–8.2)
SODIUM SERPL-SCNC: 129 MMOL/L (ref 136–145)
TRIGL SERPL-MCNC: 305 MG/DL

## 2019-09-09 PROCEDURE — 80061 LIPID PANEL: CPT

## 2019-09-09 PROCEDURE — 36415 COLL VENOUS BLD VENIPUNCTURE: CPT

## 2019-09-09 PROCEDURE — 80053 COMPREHEN METABOLIC PANEL: CPT

## 2019-09-09 PROCEDURE — 83735 ASSAY OF MAGNESIUM: CPT

## 2019-09-09 PROCEDURE — 86803 HEPATITIS C AB TEST: CPT

## 2019-09-10 ENCOUNTER — TELEPHONE (OUTPATIENT)
Dept: FAMILY MEDICINE CLINIC | Facility: CLINIC | Age: 59
End: 2019-09-10

## 2019-09-10 NOTE — TELEPHONE ENCOUNTER
----- Message from [id number] sent at 9/10/2019  9:59 AM EDT -----  Pt cholesterol if elevated  I would like to see her back to discuss her labs

## 2019-09-13 ENCOUNTER — OFFICE VISIT (OUTPATIENT)
Dept: FAMILY MEDICINE CLINIC | Facility: CLINIC | Age: 59
End: 2019-09-13
Payer: COMMERCIAL

## 2019-09-13 VITALS
TEMPERATURE: 97.9 F | HEART RATE: 88 BPM | WEIGHT: 173.6 LBS | HEIGHT: 62 IN | BODY MASS INDEX: 31.94 KG/M2 | RESPIRATION RATE: 16 BRPM | DIASTOLIC BLOOD PRESSURE: 90 MMHG | OXYGEN SATURATION: 98 % | SYSTOLIC BLOOD PRESSURE: 168 MMHG

## 2019-09-13 DIAGNOSIS — R26.9 GAIT ABNORMALITY: ICD-10-CM

## 2019-09-13 DIAGNOSIS — Z12.11 ENCOUNTER FOR SCREENING FOR MALIGNANT NEOPLASM OF COLON: Primary | ICD-10-CM

## 2019-09-13 DIAGNOSIS — L03.115 CELLULITIS OF RIGHT LOWER EXTREMITY: ICD-10-CM

## 2019-09-13 DIAGNOSIS — E78.2 MIXED HYPERLIPIDEMIA: ICD-10-CM

## 2019-09-13 PROBLEM — L03.90 CELLULITIS: Status: ACTIVE | Noted: 2019-09-13

## 2019-09-13 PROCEDURE — 3008F BODY MASS INDEX DOCD: CPT | Performed by: NURSE PRACTITIONER

## 2019-09-13 PROCEDURE — 99214 OFFICE O/P EST MOD 30 MIN: CPT | Performed by: NURSE PRACTITIONER

## 2019-09-13 RX ORDER — ATORVASTATIN CALCIUM 10 MG/1
10 TABLET, FILM COATED ORAL DAILY
Qty: 30 TABLET | Refills: 2 | Status: SHIPPED | OUTPATIENT
Start: 2019-09-13 | End: 2020-01-14 | Stop reason: SDUPTHER

## 2019-09-13 RX ORDER — HYDROCHLOROTHIAZIDE 25 MG/1
25 TABLET ORAL DAILY
COMMUNITY
End: 2020-03-27 | Stop reason: SDUPTHER

## 2019-09-13 RX ORDER — CEPHALEXIN 500 MG/1
500 CAPSULE ORAL EVERY 12 HOURS SCHEDULED
Qty: 10 CAPSULE | Refills: 0 | Status: SHIPPED | OUTPATIENT
Start: 2019-09-13 | End: 2019-09-18

## 2019-09-13 NOTE — PROGRESS NOTES
Assessment/Plan:      Diagnoses and all orders for this visit:    Encounter for screening for malignant neoplasm of colon    Gait abnormality  Comments:  MRI of brain ordered   Orders:  -     MRI brain wo contrast; Future    Cellulitis of right lower extremity  Comments:  Started on Keflex   Orders:  -     cephalexin (KEFLEX) 500 mg capsule; Take 1 capsule (500 mg total) by mouth every 12 (twelve) hours for 5 days    Mixed hyperlipidemia  Comments:  Started on atorvastatin  Will recheck lipid panel in 3 months  Orders:  -     atorvastatin (LIPITOR) 10 mg tablet; Take 1 tablet (10 mg total) by mouth daily    Other orders  -     Cancel: Occult Blood, Fecal Immunochemical; Future  -     hydrochlorothiazide (HYDRODIURIL) 25 mg tablet; Take 25 mg by mouth daily          Subjective:     Patient ID: Yu Hernandez is a 63 y o  female  HPI   Patient presents to review lab work  Ongoing complaints of bilateral leg weakness that is constant with intermittent worsening causing patient to feel as if she is going to fall  This has been occurring since her fall, which happened over 1 year ago  Review of Systems   Constitutional: Negative  Respiratory: Negative  Cardiovascular: Negative  Gastrointestinal: Negative  Endocrine: Positive for polydipsia and polyuria  Negative for cold intolerance and heat intolerance  Heat flashes r/t menopause   Genitourinary: Negative  Musculoskeletal: Negative  Allergic/Immunologic: Positive for environmental allergies  Negative for food allergies  Neurological: Positive for weakness (bilateral legs)  Negative for dizziness, tremors, seizures, syncope, facial asymmetry, speech difficulty, light-headedness, numbness and headaches  Psychiatric/Behavioral: Negative  Objective:     Physical Exam   Constitutional: She is oriented to person, place, and time  She appears well-developed and well-nourished  HENT:   Head: Normocephalic and atraumatic  Right Ear: External ear normal    Left Ear: External ear normal    Nose: Nose normal    Eyes: Pupils are equal, round, and reactive to light  Conjunctivae are normal  Right eye exhibits nystagmus  Left eye exhibits nystagmus  Neck: Normal range of motion  Neck supple  Cardiovascular: Normal rate, regular rhythm, normal heart sounds and intact distal pulses  Pulmonary/Chest: Effort normal and breath sounds normal    Abdominal: Soft  Bowel sounds are normal    Musculoskeletal: Normal range of motion  Neurological: She is alert and oriented to person, place, and time  She has normal reflexes  Gait abnormal    Skin: Skin is warm and dry  Capillary refill takes less than 2 seconds  Abrasion noted  Psychiatric: She has a normal mood and affect   Her behavior is normal

## 2019-09-16 ENCOUNTER — DOCUMENTATION (OUTPATIENT)
Dept: FAMILY MEDICINE CLINIC | Facility: CLINIC | Age: 59
End: 2019-09-16

## 2019-09-16 ENCOUNTER — TELEPHONE (OUTPATIENT)
Dept: FAMILY MEDICINE CLINIC | Facility: CLINIC | Age: 59
End: 2019-09-16

## 2019-09-16 NOTE — TELEPHONE ENCOUNTER
Per Fidelina/ MARCIO  Decision #[id number]  Score 8  MRI Brain  Dx: abnormal gait  Form faxed to [facility] with DSN  Pt informed ok to sched  She requested [employer], not [employer] due to convenience  Pt will call to sched

## 2019-11-22 ENCOUNTER — HOSPITAL ENCOUNTER (OUTPATIENT)
Dept: RADIOLOGY | Facility: IMAGING CENTER | Age: 59
Discharge: HOME | End: 2019-11-22
Payer: COMMERCIAL

## 2019-11-22 DIAGNOSIS — R26.9 GAIT ABNORMALITY: ICD-10-CM

## 2019-11-22 PROCEDURE — 70551 MRI BRAIN STEM W/O DYE: CPT

## 2019-11-26 ENCOUNTER — TELEPHONE (OUTPATIENT)
Dept: FAMILY MEDICINE CLINIC | Facility: CLINIC | Age: 59
End: 2019-11-26

## 2019-11-26 NOTE — TELEPHONE ENCOUNTER
Labs drawn and sent     Patient called for results of MRI she had on Friday ordered by [facility]    [phone]

## 2020-01-14 DIAGNOSIS — E78.2 MIXED HYPERLIPIDEMIA: ICD-10-CM

## 2020-01-14 DIAGNOSIS — I10 ESSENTIAL HYPERTENSION: ICD-10-CM

## 2020-01-14 RX ORDER — ATORVASTATIN CALCIUM 10 MG/1
10 TABLET, FILM COATED ORAL DAILY
Qty: 30 TABLET | Refills: 1 | Status: SHIPPED | OUTPATIENT
Start: 2020-01-14 | End: 2020-03-27 | Stop reason: SDUPTHER

## 2020-01-14 RX ORDER — IRBESARTAN 300 MG/1
300 TABLET ORAL
Qty: 30 TABLET | Refills: 1 | Status: SHIPPED | OUTPATIENT
Start: 2020-01-14 | End: 2020-03-27 | Stop reason: SDUPTHER

## 2020-01-14 NOTE — TELEPHONE ENCOUNTER
I sent a 30 day supply with 1 refill of her medications  She needs to be seen for any further refills  Her blood pressure was significantly elevated in September  She is due for a routine follow-up visit

## 2020-03-27 DIAGNOSIS — E78.2 MIXED HYPERLIPIDEMIA: ICD-10-CM

## 2020-03-27 DIAGNOSIS — I10 ESSENTIAL HYPERTENSION: ICD-10-CM

## 2020-03-27 RX ORDER — ATORVASTATIN CALCIUM 10 MG/1
10 TABLET, FILM COATED ORAL DAILY
Qty: 30 TABLET | Refills: 0 | Status: SHIPPED | OUTPATIENT
Start: 2020-03-27 | End: 2020-04-02 | Stop reason: SDUPTHER

## 2020-03-27 RX ORDER — IRBESARTAN 300 MG/1
300 TABLET ORAL
Qty: 30 TABLET | Refills: 0 | Status: SHIPPED | OUTPATIENT
Start: 2020-03-27 | End: 2020-04-02 | Stop reason: SDUPTHER

## 2020-03-27 RX ORDER — HYDROCHLOROTHIAZIDE 25 MG/1
25 TABLET ORAL DAILY
Qty: 30 TABLET | Refills: 0 | Status: SHIPPED | OUTPATIENT
Start: 2020-03-27 | End: 2020-04-02 | Stop reason: SDUPTHER

## 2020-03-27 NOTE — TELEPHONE ENCOUNTER
I did fill the prescriptions once again for 30 days with no refills    No further refills will be honored if she does not come to her appointment on April 2nd

## 2020-03-27 NOTE — TELEPHONE ENCOUNTER
Pt last seen 9/13/19 and I did call her because she cancelled last appointment in 1/2020 and I did reschedule her for 4/2/20   Sent to provider for approval

## 2020-04-01 PROBLEM — E87.6 HYPOKALEMIA: Status: ACTIVE | Noted: 2020-04-01

## 2020-04-02 ENCOUNTER — TELEMEDICINE (OUTPATIENT)
Dept: FAMILY MEDICINE CLINIC | Facility: CLINIC | Age: 60
End: 2020-04-02
Payer: COMMERCIAL

## 2020-04-02 VITALS
SYSTOLIC BLOOD PRESSURE: 138 MMHG | BODY MASS INDEX: 31.28 KG/M2 | WEIGHT: 170 LBS | DIASTOLIC BLOOD PRESSURE: 80 MMHG | HEIGHT: 62 IN | TEMPERATURE: 97 F

## 2020-04-02 DIAGNOSIS — E87.6 HYPOKALEMIA: Primary | ICD-10-CM

## 2020-04-02 DIAGNOSIS — E78.2 MIXED HYPERLIPIDEMIA: ICD-10-CM

## 2020-04-02 DIAGNOSIS — I10 ESSENTIAL HYPERTENSION: ICD-10-CM

## 2020-04-02 PROCEDURE — 99213 OFFICE O/P EST LOW 20 MIN: CPT | Performed by: PHYSICIAN ASSISTANT

## 2020-04-02 PROCEDURE — 3079F DIAST BP 80-89 MM HG: CPT | Performed by: PHYSICIAN ASSISTANT

## 2020-04-02 PROCEDURE — 3075F SYST BP GE 130 - 139MM HG: CPT | Performed by: PHYSICIAN ASSISTANT

## 2020-04-02 PROCEDURE — 3008F BODY MASS INDEX DOCD: CPT | Performed by: PHYSICIAN ASSISTANT

## 2020-04-02 RX ORDER — IRBESARTAN 300 MG/1
300 TABLET ORAL
Qty: 90 TABLET | Refills: 0 | Status: SHIPPED | OUTPATIENT
Start: 2020-04-02 | End: 2020-07-31 | Stop reason: SDUPTHER

## 2020-04-02 RX ORDER — HYDROCHLOROTHIAZIDE 25 MG/1
25 TABLET ORAL DAILY
Qty: 90 TABLET | Refills: 0 | Status: SHIPPED | OUTPATIENT
Start: 2020-04-02 | End: 2020-07-31 | Stop reason: SDUPTHER

## 2020-04-02 RX ORDER — ATORVASTATIN CALCIUM 10 MG/1
10 TABLET, FILM COATED ORAL DAILY
Qty: 90 TABLET | Refills: 0 | Status: SHIPPED | OUTPATIENT
Start: 2020-04-02 | End: 2020-07-31 | Stop reason: SDUPTHER

## 2020-06-03 ENCOUNTER — TELEPHONE (OUTPATIENT)
Dept: FAMILY MEDICINE CLINIC | Facility: CLINIC | Age: 60
End: 2020-06-03

## 2020-07-28 ENCOUNTER — LAB REQUISITION (OUTPATIENT)
Dept: LAB | Facility: HOSPITAL | Age: 60
End: 2020-07-28
Payer: COMMERCIAL

## 2020-07-28 DIAGNOSIS — R05.9 COUGH: ICD-10-CM

## 2020-07-28 LAB — SARS-COV-2 N GENE RESP QL NAA+PROBE: NEGATIVE

## 2020-07-28 PROCEDURE — U0003 INFECTIOUS AGENT DETECTION BY NUCLEIC ACID (DNA OR RNA); SEVERE ACUTE RESPIRATORY SYNDROME CORONAVIRUS 2 (SARS-COV-2) (CORONAVIRUS DISEASE [COVID-19]), AMPLIFIED PROBE TECHNIQUE, MAKING USE OF HIGH THROUGHPUT TECHNOLOGIES AS DESCRIBED BY CMS-2020-01-R: HCPCS | Performed by: FAMILY MEDICINE

## 2020-07-31 DIAGNOSIS — I10 ESSENTIAL HYPERTENSION: ICD-10-CM

## 2020-07-31 DIAGNOSIS — E78.2 MIXED HYPERLIPIDEMIA: ICD-10-CM

## 2020-07-31 NOTE — TELEPHONE ENCOUNTER
Pt called asking for refills of atorvastatin, HCTZ and Irbesartan  She works at [employer]    She scheduled appt with anna for 8/4/20  But she is OUT of all her meds

## 2020-08-03 NOTE — TELEPHONE ENCOUNTER
Pt last seen 4/2/20, cx her appt for tomorrow  Refill request sent to provider  Per Sandhya's msg, pt is out of her medication

## 2020-08-04 RX ORDER — ATORVASTATIN CALCIUM 10 MG/1
10 TABLET, FILM COATED ORAL DAILY
Qty: 14 TABLET | Refills: 0 | Status: SHIPPED | OUTPATIENT
Start: 2020-08-04 | End: 2020-09-30 | Stop reason: SDUPTHER

## 2020-08-04 RX ORDER — HYDROCHLOROTHIAZIDE 25 MG/1
25 TABLET ORAL DAILY
Qty: 14 TABLET | Refills: 0 | Status: SHIPPED | OUTPATIENT
Start: 2020-08-04 | End: 2020-09-30 | Stop reason: SDUPTHER

## 2020-08-04 RX ORDER — IRBESARTAN 300 MG/1
300 TABLET ORAL
Qty: 14 TABLET | Refills: 0 | Status: SHIPPED | OUTPATIENT
Start: 2020-08-04 | End: 2020-09-30 | Stop reason: SDUPTHER

## 2020-08-04 NOTE — TELEPHONE ENCOUNTER
Please let her know that I will give her a 14 day supply  That should be enough time for her to be seen here in the office    No further refills will be honored after the 14 day supply

## 2020-08-06 ENCOUNTER — TELEPHONE (OUTPATIENT)
Dept: FAMILY MEDICINE CLINIC | Facility: CLINIC | Age: 60
End: 2020-08-06

## 2020-08-06 NOTE — TELEPHONE ENCOUNTER
Please call the patient and remind her to proceed with the fasting blood work that is in the system from March in preparation for her appointment on Thursday August 13th at 3:00 p m    Thank you Shift Summary 1930: Bedside and Verbal shift change report given to Dmitriy Kelly RN (oncoming nurse) by Cintia Looney RN (offgoing nurse). Report included the following information SBAR, Kardex, Procedure Summary, Intake/Output, MAR, Accordion, Recent Results and Cardiac Rhythm NSR.  
 
2000: Patient reporting feeling much better today, demonstrates moving and holding things with her right hand, reports having been up to chair for meals. Declines pain medicine when offered. 0100: Per MD note, removed field catheter. 0600: Patient slept well overnight. Medicated for pain once at about 0100.  
 
0730: Bedside and Verbal shift change report given to Johanny Turner RN (oncoming nurse) by Dmitriy Kelly RN (offgoing nurse). Report included the following information SBAR, Kardex, Procedure Summary, Intake/Output, MAR, Accordion, Recent Results and Cardiac Rhythm NSR. Care Plan Summary Problem: Falls - Risk of 
Goal: *Absence of Falls Description Document Teofilo Jaime Fall Risk and appropriate interventions in the flowsheet. Outcome: Progressing Towards Goal 
  
Problem: Patient Education: Go to Patient Education Activity Goal: Patient/Family Education Outcome: Progressing Towards Goal 
  
Problem: Pressure Injury - Risk of 
Goal: *Prevention of pressure injury Description Document Darrion Scale and appropriate interventions in the flowsheet. Outcome: Progressing Towards Goal 
  
Problem: Patient Education: Go to Patient Education Activity Goal: Patient/Family Education Outcome: Progressing Towards Goal 
  
Problem: Patient Education: Go to Patient Education Activity Goal: Patient/Family Education Outcome: Progressing Towards Goal 
  
Problem: Surgical Pathway Post-Op Day 2 through Discharge Goal: Activity/Safety Outcome: Progressing Towards Goal 
Goal: Discharge Planning Outcome: Progressing Towards Goal 
Goal: Medications Outcome: Progressing Towards Goal 
 Goal: Treatments/Interventions/Procedures Outcome: Progressing Towards Goal 
Goal: *Demonstrates progressive activity Outcome: Progressing Towards Goal 
  
Problem: Surgical Pathway Post-Op Day 2 through Discharge Goal: Nutrition/Diet Outcome: Resolved/Met Goal: Respiratory Outcome: Resolved/Met Goal: Psychosocial 
Outcome: Resolved/Met Goal: *No signs and symptoms of infection or wound complications Outcome: Resolved/Met Goal: *Optimal pain control at patient's stated goal 
Outcome: Resolved/Met Goal: *Adequate urinary output (equal to or greater than 30 milliliters/hour) Outcome: Resolved/Met Goal: *Hemodynamically stable Outcome: Resolved/Met Goal: *Tolerating diet Outcome: Resolved/Met Goal: *Lungs clear or at baseline Outcome: Resolved/Met

## 2020-08-20 ENCOUNTER — LAB REQUISITION (OUTPATIENT)
Dept: LAB | Facility: HOSPITAL | Age: 60
End: 2020-08-20
Payer: COMMERCIAL

## 2020-08-20 DIAGNOSIS — R05.9 COUGH: ICD-10-CM

## 2020-08-20 PROCEDURE — U0003 INFECTIOUS AGENT DETECTION BY NUCLEIC ACID (DNA OR RNA); SEVERE ACUTE RESPIRATORY SYNDROME CORONAVIRUS 2 (SARS-COV-2) (CORONAVIRUS DISEASE [COVID-19]), AMPLIFIED PROBE TECHNIQUE, MAKING USE OF HIGH THROUGHPUT TECHNOLOGIES AS DESCRIBED BY CMS-2020-01-R: HCPCS | Performed by: FAMILY MEDICINE

## 2020-08-21 LAB — SARS-COV-2 RNA RESP QL NAA+PROBE: NEGATIVE

## 2020-09-14 ENCOUNTER — LAB REQUISITION (OUTPATIENT)
Dept: LAB | Facility: HOSPITAL | Age: 60
End: 2020-09-14
Payer: COMMERCIAL

## 2020-09-14 DIAGNOSIS — R05.9 COUGH: ICD-10-CM

## 2020-09-14 PROCEDURE — U0003 INFECTIOUS AGENT DETECTION BY NUCLEIC ACID (DNA OR RNA); SEVERE ACUTE RESPIRATORY SYNDROME CORONAVIRUS 2 (SARS-COV-2) (CORONAVIRUS DISEASE [COVID-19]), AMPLIFIED PROBE TECHNIQUE, MAKING USE OF HIGH THROUGHPUT TECHNOLOGIES AS DESCRIBED BY CMS-2020-01-R: HCPCS | Performed by: FAMILY MEDICINE

## 2020-09-15 LAB — SARS-COV-2 RNA RESP QL NAA+PROBE: NEGATIVE

## 2020-09-30 ENCOUNTER — OFFICE VISIT (OUTPATIENT)
Dept: FAMILY MEDICINE CLINIC | Facility: CLINIC | Age: 60
End: 2020-09-30
Payer: COMMERCIAL

## 2020-09-30 ENCOUNTER — HOSPITAL ENCOUNTER (OUTPATIENT)
Dept: RADIOLOGY | Facility: IMAGING CENTER | Age: 60
Discharge: HOME | End: 2020-09-30
Payer: COMMERCIAL

## 2020-09-30 VITALS
TEMPERATURE: 98.1 F | BODY MASS INDEX: 32.72 KG/M2 | DIASTOLIC BLOOD PRESSURE: 70 MMHG | HEART RATE: 92 BPM | OXYGEN SATURATION: 96 % | WEIGHT: 177.8 LBS | HEIGHT: 62 IN | RESPIRATION RATE: 18 BRPM | SYSTOLIC BLOOD PRESSURE: 154 MMHG

## 2020-09-30 DIAGNOSIS — E78.2 MIXED HYPERLIPIDEMIA: ICD-10-CM

## 2020-09-30 DIAGNOSIS — M79.645 PAIN OF LEFT MIDDLE FINGER: ICD-10-CM

## 2020-09-30 DIAGNOSIS — Z12.31 BREAST CANCER SCREENING BY MAMMOGRAM: Primary | ICD-10-CM

## 2020-09-30 DIAGNOSIS — M25.532 PAIN AND SWELLING OF LEFT WRIST: ICD-10-CM

## 2020-09-30 DIAGNOSIS — I10 ESSENTIAL HYPERTENSION: ICD-10-CM

## 2020-09-30 DIAGNOSIS — M25.432 PAIN AND SWELLING OF LEFT WRIST: ICD-10-CM

## 2020-09-30 DIAGNOSIS — M79.642 LEFT HAND PAIN: ICD-10-CM

## 2020-09-30 PROCEDURE — 73130 X-RAY EXAM OF HAND: CPT

## 2020-09-30 PROCEDURE — 3078F DIAST BP <80 MM HG: CPT | Performed by: PHYSICIAN ASSISTANT

## 2020-09-30 PROCEDURE — 99214 OFFICE O/P EST MOD 30 MIN: CPT | Performed by: PHYSICIAN ASSISTANT

## 2020-09-30 PROCEDURE — 73140 X-RAY EXAM OF FINGER(S): CPT

## 2020-09-30 PROCEDURE — 73110 X-RAY EXAM OF WRIST: CPT

## 2020-09-30 RX ORDER — IRBESARTAN 300 MG/1
300 TABLET ORAL
Qty: 30 TABLET | Refills: 1 | Status: SHIPPED | OUTPATIENT
Start: 2020-09-30 | End: 2020-10-28 | Stop reason: SDUPTHER

## 2020-09-30 RX ORDER — HYDROCHLOROTHIAZIDE 25 MG/1
25 TABLET ORAL DAILY
Qty: 30 TABLET | Refills: 1 | Status: SHIPPED | OUTPATIENT
Start: 2020-09-30 | End: 2020-10-28 | Stop reason: SDUPTHER

## 2020-09-30 RX ORDER — ATORVASTATIN CALCIUM 10 MG/1
10 TABLET, FILM COATED ORAL DAILY
Qty: 30 TABLET | Refills: 1 | Status: SHIPPED | OUTPATIENT
Start: 2020-09-30 | End: 2020-10-28 | Stop reason: SDUPTHER

## 2020-09-30 NOTE — PROGRESS NOTES
Assessment/Plan:    -proceed with x-rays today of the left wrist, hand and left middle digit  -apply ice 3 times daily for 10 minutes as needed  -recommend physical therapy  -continue with Advil as needed but advised that this can elevate blood pressure  -refer to hand surgery    -she will restart her chronic medications again including her Avapro, hydrochlorothiazide, and Lipitor   -I did advise her to proceed with the fasting labs ordered in April  -recommend follow-up here in 4-6 weeks  She did make an appointment for 10/28/2020    BMI Counseling: Body mass index is 32 52 kg/m²  The BMI is above normal  Nutrition recommendations include reducing portion sizes and decreasing overall calorie intake  Exercise recommendations include exercising 3-5 times per week  Diagnoses and all orders for this visit:    Breast cancer screening by mammogram    Pain and swelling of left wrist  -     XR wrist 3+ vw left; Future  -     Ambulatory referral to Physical Therapy; Future  -     Ambulatory referral to Hand Surgery; Future    Pain of left middle finger  -     XR finger left third digit-middle; Future  -     Ambulatory referral to Physical Therapy; Future  -     Ambulatory referral to Hand Surgery; Future    Left hand pain  -     XR hand 3+ vw left; Future  -     Ambulatory referral to Physical Therapy; Future  -     Ambulatory referral to Hand Surgery; Future    Essential hypertension  -     hydrochlorothiazide (HYDRODIURIL) 25 mg tablet; Take 1 tablet (25 mg total) by mouth daily  -     irbesartan (AVAPRO) 300 mg tablet; Take 1 tablet (300 mg total) by mouth daily at bedtime    Mixed hyperlipidemia  Comments:  Started on atorvastatin  Will recheck lipid panel in 3 months  Orders:  -     atorvastatin (LIPITOR) 10 mg tablet; Take 1 tablet (10 mg total) by mouth daily          Subjective:      Patient ID: Shania Salazar is a 62 y o  female      Patient presents today for swelling of her left hand and wrist over the last several days  She is right-handed  She denies any injury  She is having difficulty moving her wrist because of pain  She denies any relief with Advil  She states quite a few months ago she was having some numbness and tingling in her hand but she does not notice any numbness or tingling at this time  She also has a lump and some pain and swelling of her left middle finger  No locking or catching  She states that she has been out of her blood pressure medication since mid August   She has not been here in the office for routine visit since August of 2019  The following portions of the patient's history were reviewed and updated as appropriate:   She  has a past medical history of Hypertension  She   Patient Active Problem List    Diagnosis Date Noted    Pain and swelling of left wrist 09/30/2020    Pain of left middle finger 09/30/2020    Hypokalemia 04/01/2020    Cellulitis 09/13/2019    Mixed hyperlipidemia 09/13/2019    Pneumonia of both lungs due to infectious organism 08/19/2019    Bilateral leg edema 08/05/2019    Seasonal allergic rhinitis due to pollen 07/09/2019    Obesity (BMI 30 0-34 9) 06/27/2019    Breast cancer screening 06/18/2019    Gait abnormality 03/21/2019    Chronic cough 03/21/2019    Cigarette nicotine dependence without complication 20/69/6395    Chronic right shoulder pain 03/05/2019    Encounter for hepatitis C virus screening test for high risk patient 03/05/2019    Colon cancer screening 03/05/2019    Lipid screening 03/05/2019    Essential hypertension 03/04/2019     She  has a past surgical history that includes Appendectomy  Her family history includes COPD in her mother  She  reports that she has been smoking cigarettes  She has been smoking about 0 50 packs per day  She has never used smokeless tobacco  She reports current alcohol use of about 7 0 standard drinks of alcohol per week  She reports previous drug use    Current Outpatient Medications   Medication Sig Dispense Refill    albuterol (VENTOLIN HFA) 90 mcg/act inhaler Inhale 2 puffs every 6 (six) hours as needed for wheezing 18 g 0    atorvastatin (LIPITOR) 10 mg tablet Take 1 tablet (10 mg total) by mouth daily 30 tablet 1    hydrochlorothiazide (HYDRODIURIL) 25 mg tablet Take 1 tablet (25 mg total) by mouth daily 30 tablet 1    irbesartan (AVAPRO) 300 mg tablet Take 1 tablet (300 mg total) by mouth daily at bedtime 30 tablet 1    loratadine (CLARITIN) 10 mg tablet Take 1 tablet (10 mg total) by mouth daily (Patient not taking: Reported on 8/19/2019) 90 tablet 1    montelukast (SINGULAIR) 10 mg tablet Take 1 tablet (10 mg total) by mouth daily at bedtime (Patient not taking: Reported on 8/19/2019) 90 tablet 1     No current facility-administered medications for this visit  Current Outpatient Medications on File Prior to Visit   Medication Sig    albuterol (VENTOLIN HFA) 90 mcg/act inhaler Inhale 2 puffs every 6 (six) hours as needed for wheezing    [DISCONTINUED] atorvastatin (LIPITOR) 10 mg tablet Take 1 tablet (10 mg total) by mouth daily    [DISCONTINUED] hydrochlorothiazide (HYDRODIURIL) 25 mg tablet Take 1 tablet (25 mg total) by mouth daily    [DISCONTINUED] irbesartan (AVAPRO) 300 mg tablet Take 1 tablet (300 mg total) by mouth daily at bedtime    loratadine (CLARITIN) 10 mg tablet Take 1 tablet (10 mg total) by mouth daily (Patient not taking: Reported on 8/19/2019)    montelukast (SINGULAIR) 10 mg tablet Take 1 tablet (10 mg total) by mouth daily at bedtime (Patient not taking: Reported on 8/19/2019)     No current facility-administered medications on file prior to visit  She is allergic to amlodipine; lisinopril; and pollen extract       Review of Systems   Constitutional: Negative for chills and fever  Respiratory: Negative for shortness of breath and wheezing      Cardiovascular: Positive for leg swelling (She states over a month ago she did have left leg swelling which has now resolved  She is not sure if she still has some residual swelling  She did go to the urgent care at that time)  Negative for chest pain  Musculoskeletal:        As stated in HPI         Objective:      /70 (BP Location: Left arm, Patient Position: Sitting, Cuff Size: Adult)   Pulse 92   Temp 98 1 °F (36 7 °C) (Tympanic)   Resp 18   Ht 5' 2" (1 575 m)   Wt 80 6 kg (177 lb 12 8 oz)   SpO2 96%   BMI 32 52 kg/m²          Physical Exam  Constitutional:       General: She is not in acute distress  Appearance: She is well-developed  She is obese  She is not ill-appearing, toxic-appearing or diaphoretic  HENT:      Head: Normocephalic and atraumatic  Right Ear: Tympanic membrane, ear canal and external ear normal       Left Ear: Tympanic membrane, ear canal and external ear normal    Neck:      Musculoskeletal: Neck supple  Thyroid: No thyromegaly  Cardiovascular:      Rate and Rhythm: Normal rate and regular rhythm  Heart sounds: Normal heart sounds  No murmur  No friction rub  No gallop  Pulmonary:      Effort: Pulmonary effort is normal  No respiratory distress  Breath sounds: Normal breath sounds  No wheezing, rhonchi or rales  Abdominal:      General: Bowel sounds are normal       Palpations: Abdomen is soft  There is no mass  Tenderness: There is no abdominal tenderness  Musculoskeletal:         General: No deformity  Comments: Left hand and wrist:  She does have mild to moderate swelling  She does have significantly reduced range of motion of her wrist   There is no specific tenderness of the wrist or the hand  She does have a palpable mass over the 3rd digit with no tenderness  She does have reduced flexion of her digits  Lymphadenopathy:      Cervical: No cervical adenopathy  Skin:     General: Skin is warm  Neurological:      General: No focal deficit present  Mental Status: She is alert     Psychiatric:         Mood and Affect: Mood normal

## 2020-10-01 ENCOUNTER — TELEPHONE (OUTPATIENT)
Dept: FAMILY MEDICINE CLINIC | Facility: CLINIC | Age: 60
End: 2020-10-01

## 2020-10-23 ENCOUNTER — TELEPHONE (OUTPATIENT)
Dept: FAMILY MEDICINE CLINIC | Facility: CLINIC | Age: 60
End: 2020-10-23

## 2020-10-27 ENCOUNTER — LAB (OUTPATIENT)
Dept: LAB | Facility: IMAGING CENTER | Age: 60
End: 2020-10-27
Payer: COMMERCIAL

## 2020-10-27 LAB
ALBUMIN SERPL BCP-MCNC: 3.7 G/DL (ref 3.5–5)
ALP SERPL-CCNC: 84 U/L (ref 46–116)
ALT SERPL W P-5'-P-CCNC: 21 U/L (ref 12–78)
ANION GAP SERPL CALCULATED.3IONS-SCNC: 8 MMOL/L (ref 4–13)
AST SERPL W P-5'-P-CCNC: 14 U/L (ref 5–45)
BILIRUB SERPL-MCNC: 0.24 MG/DL (ref 0.2–1)
BUN SERPL-MCNC: 19 MG/DL (ref 5–25)
CALCIUM SERPL-MCNC: 9.8 MG/DL (ref 8.3–10.1)
CHLORIDE SERPL-SCNC: 104 MMOL/L (ref 100–108)
CHOLEST SERPL-MCNC: 118 MG/DL (ref 50–200)
CO2 SERPL-SCNC: 27 MMOL/L (ref 21–32)
CREAT SERPL-MCNC: 1.13 MG/DL (ref 0.6–1.3)
GFR SERPL CREATININE-BSD FRML MDRD: 53 ML/MIN/1.73SQ M
GLUCOSE P FAST SERPL-MCNC: 99 MG/DL (ref 65–99)
HDLC SERPL-MCNC: 59 MG/DL
LDLC SERPL CALC-MCNC: 37 MG/DL (ref 0–100)
NONHDLC SERPL-MCNC: 59 MG/DL
POTASSIUM SERPL-SCNC: 3.6 MMOL/L (ref 3.5–5.3)
PROT SERPL-MCNC: 8.2 G/DL (ref 6.4–8.2)
SODIUM SERPL-SCNC: 139 MMOL/L (ref 136–145)
TRIGL SERPL-MCNC: 109 MG/DL

## 2020-10-27 PROCEDURE — 80061 LIPID PANEL: CPT | Performed by: PHYSICIAN ASSISTANT

## 2020-10-27 PROCEDURE — 36415 COLL VENOUS BLD VENIPUNCTURE: CPT | Performed by: PHYSICIAN ASSISTANT

## 2020-10-27 PROCEDURE — 80053 COMPREHEN METABOLIC PANEL: CPT | Performed by: PHYSICIAN ASSISTANT

## 2020-10-28 ENCOUNTER — OFFICE VISIT (OUTPATIENT)
Dept: FAMILY MEDICINE CLINIC | Facility: CLINIC | Age: 60
End: 2020-10-28
Payer: COMMERCIAL

## 2020-10-28 VITALS
DIASTOLIC BLOOD PRESSURE: 64 MMHG | SYSTOLIC BLOOD PRESSURE: 142 MMHG | HEIGHT: 62 IN | TEMPERATURE: 97.2 F | BODY MASS INDEX: 31.87 KG/M2 | RESPIRATION RATE: 18 BRPM | HEART RATE: 78 BPM | WEIGHT: 173.2 LBS | OXYGEN SATURATION: 96 %

## 2020-10-28 DIAGNOSIS — I10 ESSENTIAL HYPERTENSION: Primary | ICD-10-CM

## 2020-10-28 DIAGNOSIS — F33.1 MODERATE EPISODE OF RECURRENT MAJOR DEPRESSIVE DISORDER (HCC): ICD-10-CM

## 2020-10-28 DIAGNOSIS — M77.8 RIGHT WRIST TENDINITIS: ICD-10-CM

## 2020-10-28 DIAGNOSIS — F41.9 ANXIETY: ICD-10-CM

## 2020-10-28 DIAGNOSIS — E78.2 MIXED HYPERLIPIDEMIA: ICD-10-CM

## 2020-10-28 PROBLEM — F32.9 MAJOR DEPRESSIVE DISORDER: Status: ACTIVE | Noted: 2020-10-28

## 2020-10-28 PROCEDURE — 3008F BODY MASS INDEX DOCD: CPT | Performed by: PHYSICIAN ASSISTANT

## 2020-10-28 PROCEDURE — 3078F DIAST BP <80 MM HG: CPT | Performed by: PHYSICIAN ASSISTANT

## 2020-10-28 PROCEDURE — 99406 BEHAV CHNG SMOKING 3-10 MIN: CPT | Performed by: PHYSICIAN ASSISTANT

## 2020-10-28 PROCEDURE — 3077F SYST BP >= 140 MM HG: CPT | Performed by: PHYSICIAN ASSISTANT

## 2020-10-28 PROCEDURE — 99214 OFFICE O/P EST MOD 30 MIN: CPT | Performed by: PHYSICIAN ASSISTANT

## 2020-10-28 RX ORDER — IRBESARTAN 300 MG/1
300 TABLET ORAL
Qty: 90 TABLET | Refills: 0 | Status: SHIPPED | OUTPATIENT
Start: 2020-10-28 | End: 2021-02-11 | Stop reason: SDUPTHER

## 2020-10-28 RX ORDER — IBUPROFEN 200 MG
TABLET ORAL EVERY 6 HOURS PRN
COMMUNITY
End: 2020-10-28

## 2020-10-28 RX ORDER — SENNOSIDES 8.6 MG
650 CAPSULE ORAL EVERY 8 HOURS PRN
COMMUNITY
End: 2021-08-30 | Stop reason: SDUPTHER

## 2020-10-28 RX ORDER — HYDROCHLOROTHIAZIDE 25 MG/1
25 TABLET ORAL DAILY
Qty: 90 TABLET | Refills: 0 | Status: SHIPPED | OUTPATIENT
Start: 2020-10-28 | End: 2021-02-11 | Stop reason: SDUPTHER

## 2020-10-28 RX ORDER — ATORVASTATIN CALCIUM 10 MG/1
10 TABLET, FILM COATED ORAL DAILY
Qty: 90 TABLET | Refills: 0 | Status: SHIPPED | OUTPATIENT
Start: 2020-10-28 | End: 2021-02-11 | Stop reason: SDUPTHER

## 2020-10-29 DIAGNOSIS — F41.9 ANXIETY: ICD-10-CM

## 2020-10-29 DIAGNOSIS — F33.1 MODERATE EPISODE OF RECURRENT MAJOR DEPRESSIVE DISORDER (HCC): ICD-10-CM

## 2020-11-16 PROCEDURE — U0003 INFECTIOUS AGENT DETECTION BY NUCLEIC ACID (DNA OR RNA); SEVERE ACUTE RESPIRATORY SYNDROME CORONAVIRUS 2 (SARS-COV-2) (CORONAVIRUS DISEASE [COVID-19]), AMPLIFIED PROBE TECHNIQUE, MAKING USE OF HIGH THROUGHPUT TECHNOLOGIES AS DESCRIBED BY CMS-2020-01-R: HCPCS | Performed by: FAMILY MEDICINE

## 2020-11-17 ENCOUNTER — LAB REQUISITION (OUTPATIENT)
Dept: LAB | Facility: HOSPITAL | Age: 60
End: 2020-11-17
Payer: COMMERCIAL

## 2020-11-17 DIAGNOSIS — Z03.818 ENCOUNTER FOR OBSERVATION FOR SUSPECTED EXPOSURE TO OTHER BIOLOGICAL AGENTS RULED OUT: ICD-10-CM

## 2020-11-17 DIAGNOSIS — Z11.59 ENCOUNTER FOR SCREENING FOR OTHER VIRAL DISEASES: ICD-10-CM

## 2020-11-18 LAB — SARS-COV-2 RNA SPEC QL NAA+PROBE: NOT DETECTED

## 2020-11-24 PROCEDURE — U0003 INFECTIOUS AGENT DETECTION BY NUCLEIC ACID (DNA OR RNA); SEVERE ACUTE RESPIRATORY SYNDROME CORONAVIRUS 2 (SARS-COV-2) (CORONAVIRUS DISEASE [COVID-19]), AMPLIFIED PROBE TECHNIQUE, MAKING USE OF HIGH THROUGHPUT TECHNOLOGIES AS DESCRIBED BY CMS-2020-01-R: HCPCS | Performed by: FAMILY MEDICINE

## 2020-11-25 ENCOUNTER — LAB REQUISITION (OUTPATIENT)
Dept: LAB | Facility: HOSPITAL | Age: 60
End: 2020-11-25
Payer: COMMERCIAL

## 2020-11-25 DIAGNOSIS — Z03.818 ENCOUNTER FOR OBSERVATION FOR SUSPECTED EXPOSURE TO OTHER BIOLOGICAL AGENTS RULED OUT: ICD-10-CM

## 2020-11-25 DIAGNOSIS — Z11.59 ENCOUNTER FOR SCREENING FOR OTHER VIRAL DISEASES: ICD-10-CM

## 2020-11-26 LAB — SARS-COV-2 RNA SPEC QL NAA+PROBE: NOT DETECTED

## 2020-12-02 ENCOUNTER — LAB REQUISITION (OUTPATIENT)
Dept: LAB | Facility: HOSPITAL | Age: 60
End: 2020-12-02
Payer: COMMERCIAL

## 2020-12-02 DIAGNOSIS — Z03.818 ENCOUNTER FOR OBSERVATION FOR SUSPECTED EXPOSURE TO OTHER BIOLOGICAL AGENTS RULED OUT: ICD-10-CM

## 2020-12-02 DIAGNOSIS — Z11.59 ENCOUNTER FOR SCREENING FOR OTHER VIRAL DISEASES: ICD-10-CM

## 2020-12-02 PROCEDURE — U0003 INFECTIOUS AGENT DETECTION BY NUCLEIC ACID (DNA OR RNA); SEVERE ACUTE RESPIRATORY SYNDROME CORONAVIRUS 2 (SARS-COV-2) (CORONAVIRUS DISEASE [COVID-19]), AMPLIFIED PROBE TECHNIQUE, MAKING USE OF HIGH THROUGHPUT TECHNOLOGIES AS DESCRIBED BY CMS-2020-01-R: HCPCS | Performed by: FAMILY MEDICINE

## 2020-12-04 LAB — SARS-COV-2 RNA SPEC QL NAA+PROBE: NOT DETECTED

## 2020-12-07 PROCEDURE — U0003 INFECTIOUS AGENT DETECTION BY NUCLEIC ACID (DNA OR RNA); SEVERE ACUTE RESPIRATORY SYNDROME CORONAVIRUS 2 (SARS-COV-2) (CORONAVIRUS DISEASE [COVID-19]), AMPLIFIED PROBE TECHNIQUE, MAKING USE OF HIGH THROUGHPUT TECHNOLOGIES AS DESCRIBED BY CMS-2020-01-R: HCPCS | Performed by: FAMILY MEDICINE

## 2020-12-08 ENCOUNTER — LAB REQUISITION (OUTPATIENT)
Dept: LAB | Facility: HOSPITAL | Age: 60
End: 2020-12-08
Payer: COMMERCIAL

## 2020-12-08 DIAGNOSIS — Z11.59 ENCOUNTER FOR SCREENING FOR OTHER VIRAL DISEASES: ICD-10-CM

## 2020-12-08 DIAGNOSIS — Z03.818 ENCOUNTER FOR OBSERVATION FOR SUSPECTED EXPOSURE TO OTHER BIOLOGICAL AGENTS RULED OUT: ICD-10-CM

## 2020-12-09 LAB — SARS-COV-2 RNA SPEC QL NAA+PROBE: NOT DETECTED

## 2020-12-14 ENCOUNTER — LAB REQUISITION (OUTPATIENT)
Dept: LAB | Facility: HOSPITAL | Age: 60
End: 2020-12-14
Payer: COMMERCIAL

## 2020-12-14 DIAGNOSIS — Z11.59 ENCOUNTER FOR SCREENING FOR OTHER VIRAL DISEASES: ICD-10-CM

## 2020-12-14 PROCEDURE — U0003 INFECTIOUS AGENT DETECTION BY NUCLEIC ACID (DNA OR RNA); SEVERE ACUTE RESPIRATORY SYNDROME CORONAVIRUS 2 (SARS-COV-2) (CORONAVIRUS DISEASE [COVID-19]), AMPLIFIED PROBE TECHNIQUE, MAKING USE OF HIGH THROUGHPUT TECHNOLOGIES AS DESCRIBED BY CMS-2020-01-R: HCPCS | Performed by: FAMILY MEDICINE

## 2020-12-15 LAB — SARS-COV-2 RNA SPEC QL NAA+PROBE: NOT DETECTED

## 2020-12-21 ENCOUNTER — LAB REQUISITION (OUTPATIENT)
Dept: LAB | Facility: HOSPITAL | Age: 60
End: 2020-12-21
Payer: COMMERCIAL

## 2020-12-21 DIAGNOSIS — Z11.59 ENCOUNTER FOR SCREENING FOR OTHER VIRAL DISEASES: ICD-10-CM

## 2020-12-21 PROCEDURE — U0003 INFECTIOUS AGENT DETECTION BY NUCLEIC ACID (DNA OR RNA); SEVERE ACUTE RESPIRATORY SYNDROME CORONAVIRUS 2 (SARS-COV-2) (CORONAVIRUS DISEASE [COVID-19]), AMPLIFIED PROBE TECHNIQUE, MAKING USE OF HIGH THROUGHPUT TECHNOLOGIES AS DESCRIBED BY CMS-2020-01-R: HCPCS | Performed by: FAMILY MEDICINE

## 2020-12-23 LAB — SARS-COV-2 RNA SPEC QL NAA+PROBE: NOT DETECTED

## 2020-12-24 PROCEDURE — U0003 INFECTIOUS AGENT DETECTION BY NUCLEIC ACID (DNA OR RNA); SEVERE ACUTE RESPIRATORY SYNDROME CORONAVIRUS 2 (SARS-COV-2) (CORONAVIRUS DISEASE [COVID-19]), AMPLIFIED PROBE TECHNIQUE, MAKING USE OF HIGH THROUGHPUT TECHNOLOGIES AS DESCRIBED BY CMS-2020-01-R: HCPCS | Performed by: FAMILY MEDICINE

## 2020-12-25 ENCOUNTER — LAB REQUISITION (OUTPATIENT)
Dept: LAB | Facility: HOSPITAL | Age: 60
End: 2020-12-25
Payer: COMMERCIAL

## 2020-12-25 DIAGNOSIS — Z03.818 ENCOUNTER FOR OBSERVATION FOR SUSPECTED EXPOSURE TO OTHER BIOLOGICAL AGENTS RULED OUT: ICD-10-CM

## 2020-12-25 DIAGNOSIS — Z11.59 ENCOUNTER FOR SCREENING FOR OTHER VIRAL DISEASES: ICD-10-CM

## 2020-12-26 LAB — SARS-COV-2 RNA SPEC QL NAA+PROBE: NOT DETECTED

## 2020-12-28 ENCOUNTER — LAB REQUISITION (OUTPATIENT)
Dept: LAB | Facility: HOSPITAL | Age: 60
End: 2020-12-28
Payer: COMMERCIAL

## 2020-12-28 DIAGNOSIS — Z11.59 ENCOUNTER FOR SCREENING FOR OTHER VIRAL DISEASES: ICD-10-CM

## 2020-12-28 DIAGNOSIS — Z03.818 ENCOUNTER FOR OBSERVATION FOR SUSPECTED EXPOSURE TO OTHER BIOLOGICAL AGENTS RULED OUT: ICD-10-CM

## 2020-12-28 PROCEDURE — U0003 INFECTIOUS AGENT DETECTION BY NUCLEIC ACID (DNA OR RNA); SEVERE ACUTE RESPIRATORY SYNDROME CORONAVIRUS 2 (SARS-COV-2) (CORONAVIRUS DISEASE [COVID-19]), AMPLIFIED PROBE TECHNIQUE, MAKING USE OF HIGH THROUGHPUT TECHNOLOGIES AS DESCRIBED BY CMS-2020-01-R: HCPCS | Performed by: FAMILY MEDICINE

## 2020-12-29 LAB — SARS-COV-2 RNA SPEC QL NAA+PROBE: NOT DETECTED

## 2021-01-04 ENCOUNTER — LAB REQUISITION (OUTPATIENT)
Dept: LAB | Facility: HOSPITAL | Age: 61
End: 2021-01-04
Payer: COMMERCIAL

## 2021-01-04 DIAGNOSIS — Z03.818 ENCOUNTER FOR OBSERVATION FOR SUSPECTED EXPOSURE TO OTHER BIOLOGICAL AGENTS RULED OUT: ICD-10-CM

## 2021-01-04 DIAGNOSIS — Z11.59 ENCOUNTER FOR SCREENING FOR OTHER VIRAL DISEASES: ICD-10-CM

## 2021-01-04 PROCEDURE — U0003 INFECTIOUS AGENT DETECTION BY NUCLEIC ACID (DNA OR RNA); SEVERE ACUTE RESPIRATORY SYNDROME CORONAVIRUS 2 (SARS-COV-2) (CORONAVIRUS DISEASE [COVID-19]), AMPLIFIED PROBE TECHNIQUE, MAKING USE OF HIGH THROUGHPUT TECHNOLOGIES AS DESCRIBED BY CMS-2020-01-R: HCPCS | Performed by: FAMILY MEDICINE

## 2021-01-05 LAB — SARS-COV-2 RNA SPEC QL NAA+PROBE: NOT DETECTED

## 2021-01-11 ENCOUNTER — LAB REQUISITION (OUTPATIENT)
Dept: LAB | Facility: HOSPITAL | Age: 61
End: 2021-01-11
Payer: COMMERCIAL

## 2021-01-11 DIAGNOSIS — Z03.818 ENCOUNTER FOR OBSERVATION FOR SUSPECTED EXPOSURE TO OTHER BIOLOGICAL AGENTS RULED OUT: ICD-10-CM

## 2021-01-11 DIAGNOSIS — Z11.59 ENCOUNTER FOR SCREENING FOR OTHER VIRAL DISEASES: ICD-10-CM

## 2021-01-11 PROCEDURE — U0005 INFEC AGEN DETEC AMPLI PROBE: HCPCS | Performed by: FAMILY MEDICINE

## 2021-01-11 PROCEDURE — U0003 INFECTIOUS AGENT DETECTION BY NUCLEIC ACID (DNA OR RNA); SEVERE ACUTE RESPIRATORY SYNDROME CORONAVIRUS 2 (SARS-COV-2) (CORONAVIRUS DISEASE [COVID-19]), AMPLIFIED PROBE TECHNIQUE, MAKING USE OF HIGH THROUGHPUT TECHNOLOGIES AS DESCRIBED BY CMS-2020-01-R: HCPCS | Performed by: FAMILY MEDICINE

## 2021-01-12 LAB — SARS-COV-2 RNA SPEC QL NAA+PROBE: NOT DETECTED

## 2021-01-18 ENCOUNTER — LAB REQUISITION (OUTPATIENT)
Dept: LAB | Facility: HOSPITAL | Age: 61
End: 2021-01-18
Payer: COMMERCIAL

## 2021-01-18 DIAGNOSIS — Z11.59 ENCOUNTER FOR SCREENING FOR OTHER VIRAL DISEASES: ICD-10-CM

## 2021-01-18 DIAGNOSIS — Z03.818 ENCOUNTER FOR OBSERVATION FOR SUSPECTED EXPOSURE TO OTHER BIOLOGICAL AGENTS RULED OUT: ICD-10-CM

## 2021-01-18 PROCEDURE — U0003 INFECTIOUS AGENT DETECTION BY NUCLEIC ACID (DNA OR RNA); SEVERE ACUTE RESPIRATORY SYNDROME CORONAVIRUS 2 (SARS-COV-2) (CORONAVIRUS DISEASE [COVID-19]), AMPLIFIED PROBE TECHNIQUE, MAKING USE OF HIGH THROUGHPUT TECHNOLOGIES AS DESCRIBED BY CMS-2020-01-R: HCPCS | Performed by: FAMILY MEDICINE

## 2021-01-18 PROCEDURE — U0005 INFEC AGEN DETEC AMPLI PROBE: HCPCS | Performed by: FAMILY MEDICINE

## 2021-01-20 LAB — SARS-COV-2 RNA RESP QL NAA+PROBE: NEGATIVE

## 2021-01-25 ENCOUNTER — LAB REQUISITION (OUTPATIENT)
Dept: LAB | Facility: HOSPITAL | Age: 61
End: 2021-01-25
Payer: COMMERCIAL

## 2021-01-25 DIAGNOSIS — Z11.59 ENCOUNTER FOR SCREENING FOR OTHER VIRAL DISEASES: ICD-10-CM

## 2021-01-25 PROCEDURE — U0005 INFEC AGEN DETEC AMPLI PROBE: HCPCS | Performed by: FAMILY MEDICINE

## 2021-01-25 PROCEDURE — U0003 INFECTIOUS AGENT DETECTION BY NUCLEIC ACID (DNA OR RNA); SEVERE ACUTE RESPIRATORY SYNDROME CORONAVIRUS 2 (SARS-COV-2) (CORONAVIRUS DISEASE [COVID-19]), AMPLIFIED PROBE TECHNIQUE, MAKING USE OF HIGH THROUGHPUT TECHNOLOGIES AS DESCRIBED BY CMS-2020-01-R: HCPCS | Performed by: FAMILY MEDICINE

## 2021-01-26 LAB — SARS-COV-2 RNA RESP QL NAA+PROBE: NEGATIVE

## 2021-02-01 PROCEDURE — U0003 INFECTIOUS AGENT DETECTION BY NUCLEIC ACID (DNA OR RNA); SEVERE ACUTE RESPIRATORY SYNDROME CORONAVIRUS 2 (SARS-COV-2) (CORONAVIRUS DISEASE [COVID-19]), AMPLIFIED PROBE TECHNIQUE, MAKING USE OF HIGH THROUGHPUT TECHNOLOGIES AS DESCRIBED BY CMS-2020-01-R: HCPCS | Performed by: FAMILY MEDICINE

## 2021-02-01 PROCEDURE — U0005 INFEC AGEN DETEC AMPLI PROBE: HCPCS | Performed by: FAMILY MEDICINE

## 2021-02-03 ENCOUNTER — LAB REQUISITION (OUTPATIENT)
Dept: LAB | Facility: HOSPITAL | Age: 61
End: 2021-02-03
Payer: COMMERCIAL

## 2021-02-03 DIAGNOSIS — Z03.818 ENCOUNTER FOR OBSERVATION FOR SUSPECTED EXPOSURE TO OTHER BIOLOGICAL AGENTS RULED OUT: ICD-10-CM

## 2021-02-03 DIAGNOSIS — Z11.59 ENCOUNTER FOR SCREENING FOR OTHER VIRAL DISEASES: ICD-10-CM

## 2021-02-03 LAB — SARS-COV-2 RNA RESP QL NAA+PROBE: NEGATIVE

## 2021-02-11 DIAGNOSIS — F41.9 ANXIETY: ICD-10-CM

## 2021-02-11 DIAGNOSIS — I10 ESSENTIAL HYPERTENSION: ICD-10-CM

## 2021-02-11 DIAGNOSIS — E78.2 MIXED HYPERLIPIDEMIA: ICD-10-CM

## 2021-02-11 DIAGNOSIS — F33.1 MODERATE EPISODE OF RECURRENT MAJOR DEPRESSIVE DISORDER (HCC): ICD-10-CM

## 2021-02-11 RX ORDER — HYDROCHLOROTHIAZIDE 25 MG/1
25 TABLET ORAL DAILY
Qty: 90 TABLET | Refills: 0 | Status: SHIPPED | OUTPATIENT
Start: 2021-02-11 | End: 2021-05-21 | Stop reason: SDUPTHER

## 2021-02-11 RX ORDER — ATORVASTATIN CALCIUM 10 MG/1
10 TABLET, FILM COATED ORAL DAILY
Qty: 90 TABLET | Refills: 0 | Status: SHIPPED | OUTPATIENT
Start: 2021-02-11 | End: 2021-05-21 | Stop reason: SDUPTHER

## 2021-02-11 RX ORDER — IRBESARTAN 300 MG/1
300 TABLET ORAL
Qty: 90 TABLET | Refills: 0 | Status: SHIPPED | OUTPATIENT
Start: 2021-02-11 | End: 2021-05-21 | Stop reason: SDUPTHER

## 2021-02-11 NOTE — TELEPHONE ENCOUNTER
I did call pt and schedule her for 2/16/21 but she needs a refill    she is aware she might only get a 30 day supply   Sent to provider for review and approval  Her last refill was for 90 days and no refills on  10/28/20

## 2021-02-15 ENCOUNTER — LAB REQUISITION (OUTPATIENT)
Dept: LAB | Facility: HOSPITAL | Age: 61
End: 2021-02-15
Payer: COMMERCIAL

## 2021-02-15 DIAGNOSIS — Z03.818 ENCOUNTER FOR OBSERVATION FOR SUSPECTED EXPOSURE TO OTHER BIOLOGICAL AGENTS RULED OUT: ICD-10-CM

## 2021-02-15 DIAGNOSIS — Z11.59 ENCOUNTER FOR SCREENING FOR OTHER VIRAL DISEASES: ICD-10-CM

## 2021-02-15 PROCEDURE — U0005 INFEC AGEN DETEC AMPLI PROBE: HCPCS | Performed by: FAMILY MEDICINE

## 2021-02-15 PROCEDURE — U0003 INFECTIOUS AGENT DETECTION BY NUCLEIC ACID (DNA OR RNA); SEVERE ACUTE RESPIRATORY SYNDROME CORONAVIRUS 2 (SARS-COV-2) (CORONAVIRUS DISEASE [COVID-19]), AMPLIFIED PROBE TECHNIQUE, MAKING USE OF HIGH THROUGHPUT TECHNOLOGIES AS DESCRIBED BY CMS-2020-01-R: HCPCS | Performed by: FAMILY MEDICINE

## 2021-02-16 ENCOUNTER — OFFICE VISIT (OUTPATIENT)
Dept: FAMILY MEDICINE CLINIC | Facility: CLINIC | Age: 61
End: 2021-02-16
Payer: COMMERCIAL

## 2021-02-16 VITALS
SYSTOLIC BLOOD PRESSURE: 128 MMHG | RESPIRATION RATE: 16 BRPM | HEIGHT: 62 IN | TEMPERATURE: 97.8 F | HEART RATE: 83 BPM | BODY MASS INDEX: 32.61 KG/M2 | OXYGEN SATURATION: 95 % | DIASTOLIC BLOOD PRESSURE: 68 MMHG | WEIGHT: 177.2 LBS

## 2021-02-16 DIAGNOSIS — Z12.31 SCREENING MAMMOGRAM, ENCOUNTER FOR: Primary | ICD-10-CM

## 2021-02-16 DIAGNOSIS — F17.210 CIGARETTE NICOTINE DEPENDENCE WITHOUT COMPLICATION: ICD-10-CM

## 2021-02-16 DIAGNOSIS — F33.1 MODERATE EPISODE OF RECURRENT MAJOR DEPRESSIVE DISORDER (HCC): ICD-10-CM

## 2021-02-16 DIAGNOSIS — M79.672 BILATERAL FOOT PAIN: ICD-10-CM

## 2021-02-16 DIAGNOSIS — M79.671 BILATERAL FOOT PAIN: ICD-10-CM

## 2021-02-16 DIAGNOSIS — R60.0 BILATERAL LEG EDEMA: ICD-10-CM

## 2021-02-16 DIAGNOSIS — E78.2 MIXED HYPERLIPIDEMIA: ICD-10-CM

## 2021-02-16 DIAGNOSIS — I10 ESSENTIAL HYPERTENSION: ICD-10-CM

## 2021-02-16 DIAGNOSIS — R26.9 GAIT ABNORMALITY: ICD-10-CM

## 2021-02-16 LAB — SARS-COV-2 RNA RESP QL NAA+PROBE: NEGATIVE

## 2021-02-16 PROCEDURE — 3008F BODY MASS INDEX DOCD: CPT | Performed by: PHYSICIAN ASSISTANT

## 2021-02-16 PROCEDURE — 3078F DIAST BP <80 MM HG: CPT | Performed by: PHYSICIAN ASSISTANT

## 2021-02-16 PROCEDURE — 4004F PT TOBACCO SCREEN RCVD TLK: CPT | Performed by: PHYSICIAN ASSISTANT

## 2021-02-16 PROCEDURE — 3074F SYST BP LT 130 MM HG: CPT | Performed by: PHYSICIAN ASSISTANT

## 2021-02-16 PROCEDURE — 99214 OFFICE O/P EST MOD 30 MIN: CPT | Performed by: PHYSICIAN ASSISTANT

## 2021-02-16 NOTE — PROGRESS NOTES
Assessment/Plan:      -Continue with medications as advised   - she will schedule her mammogram   - she will complete her Cologuard that she has at home   - refer to Podiatry for bilateral foot pain / swelling and abnormal gait   - I did recommend proceeding with x-rays of both feet prior to her appointment with podiatry   - proceed with fasting labs prior to her next visit in June    BMI Counseling: Body mass index is 32 41 kg/m²  The BMI is above normal  Nutrition recommendations include reducing portion sizes and decreasing overall calorie intake  Exercise recommendations include exercising 3-5 times per week  Tobacco Cessation Counseling: Tobacco cessation counseling and education was provided  The patient is sincerely urged to quit consumption of tobacco  She is not ready to quit tobacco  The numerous health risks of tobacco consumption were discussed  patient is not ready to quit at this time and this was discussed for about 3 minutes  M*[facility] software was used to dictate this note  It may contain errors with dictating incorrect words/spelling  Please contact provider directly for any questions  Diagnoses and all orders for this visit:    Screening mammogram, encounter for  -     Mammo screening bilateral w cad; Future    Essential hypertension  -     Comprehensive metabolic panel  -     Microalbumin / creatinine urine ratio; Future    Bilateral leg edema  -     Comprehensive metabolic panel    Gait abnormality  -     Ambulatory referral to Podiatry; Future    Moderate episode of recurrent major depressive disorder (HCC)    Mixed hyperlipidemia  -     Comprehensive metabolic panel  -     Lipid panel    Bilateral foot pain  -     Ambulatory referral to Podiatry; Future  -     XR foot 3+ vw left; Future  -     XR foot 3+ vw right; Future    Cigarette nicotine dependence without complication          Subjective:      Patient ID: Olivia Lewis is a 62 y o  female       Patient presents today for follow-up of hypertension, hyperlipidemia, anxiety/ depression  She states that she is compliant with her medications  She has noticed a different since she started the sertraline 50 mg daily  She has not completed her Cologuard but she plans on doing so in the future   She will schedule her mammogram   She states that she has been having difficulty walking for quite a while  She does notice swelling of her feet at this time along with pain  She has not seen a specialist for this problem  The following portions of the patient's history were reviewed and updated as appropriate:   She  has a past medical history of Hypertension  She   Patient Active Problem List    Diagnosis Date Noted    BMI 32 0-32 9,adult 02/16/2021    Bilateral foot pain 02/16/2021    Major depressive disorder 10/28/2020    Anxiety 10/28/2020    Right wrist tendinitis 10/28/2020    Pain and swelling of left wrist 09/30/2020    Pain of left middle finger 09/30/2020    Hypokalemia 04/01/2020    Cellulitis 09/13/2019    Mixed hyperlipidemia 09/13/2019    Pneumonia of both lungs due to infectious organism 08/19/2019    Bilateral leg edema 08/05/2019    Seasonal allergic rhinitis due to pollen 07/09/2019    Obesity (BMI 30 0-34 9) 06/27/2019    Breast cancer screening 06/18/2019    Gait abnormality 03/21/2019    Chronic cough 03/21/2019    Cigarette nicotine dependence without complication 39/28/1765    Chronic right shoulder pain 03/05/2019    Encounter for hepatitis C virus screening test for high risk patient 03/05/2019    Colon cancer screening 03/05/2019    Lipid screening 03/05/2019    Essential hypertension 03/04/2019     She  has a past surgical history that includes Appendectomy  Her family history includes COPD in her mother  She  reports that she has been smoking cigarettes  She has been smoking about 0 50 packs per day   She has never used smokeless tobacco  She reports current alcohol use of about 7 0 standard drinks of alcohol per week  She reports previous drug use  Current Outpatient Medications   Medication Sig Dispense Refill    acetaminophen (TYLENOL) 650 mg CR tablet Take 650 mg by mouth every 8 (eight) hours as needed for mild pain      albuterol (VENTOLIN HFA) 90 mcg/act inhaler Inhale 2 puffs every 6 (six) hours as needed for wheezing 18 g 0    atorvastatin (LIPITOR) 10 mg tablet Take 1 tablet (10 mg total) by mouth daily 90 tablet 0    hydrochlorothiazide (HYDRODIURIL) 25 mg tablet Take 1 tablet (25 mg total) by mouth daily 90 tablet 0    irbesartan (AVAPRO) 300 mg tablet Take 1 tablet (300 mg total) by mouth daily at bedtime 90 tablet 0    sertraline (ZOLOFT) 50 mg tablet Take 1 tablet (50 mg total) by mouth daily 90 tablet 0     No current facility-administered medications for this visit  Current Outpatient Medications on File Prior to Visit   Medication Sig    acetaminophen (TYLENOL) 650 mg CR tablet Take 650 mg by mouth every 8 (eight) hours as needed for mild pain    albuterol (VENTOLIN HFA) 90 mcg/act inhaler Inhale 2 puffs every 6 (six) hours as needed for wheezing    atorvastatin (LIPITOR) 10 mg tablet Take 1 tablet (10 mg total) by mouth daily    hydrochlorothiazide (HYDRODIURIL) 25 mg tablet Take 1 tablet (25 mg total) by mouth daily    irbesartan (AVAPRO) 300 mg tablet Take 1 tablet (300 mg total) by mouth daily at bedtime    sertraline (ZOLOFT) 50 mg tablet Take 1 tablet (50 mg total) by mouth daily     No current facility-administered medications on file prior to visit  She is allergic to amlodipine; lisinopril; pollen extract; and latex       Review of Systems   Constitutional: Negative  Respiratory: Negative for cough and shortness of breath  Cardiovascular: Positive for leg swelling  Negative for chest pain  Gastrointestinal: Negative for abdominal pain and blood in stool  Musculoskeletal: Positive for gait problem     Psychiatric/Behavioral:          As stated in HPI         Objective:      /68 (BP Location: Left arm, Patient Position: Sitting, Cuff Size: Large)   Pulse 83   Temp 97 8 °F (36 6 °C) (Tympanic)   Resp 16   Ht 5' 2" (1 575 m)   Wt 80 4 kg (177 lb 3 2 oz)   SpO2 95%   BMI 32 41 kg/m²          Physical Exam  Constitutional:       General: She is not in acute distress  Appearance: Normal appearance  She is well-developed  She is obese  She is not ill-appearing, toxic-appearing or diaphoretic  HENT:      Head: Normocephalic and atraumatic  Right Ear: External ear normal       Left Ear: External ear normal    Neck:      Musculoskeletal: Neck supple  Thyroid: No thyromegaly  Cardiovascular:      Rate and Rhythm: Normal rate and regular rhythm  Heart sounds: Normal heart sounds  No murmur  No friction rub  No gallop  Pulmonary:      Effort: Pulmonary effort is normal  No respiratory distress  Breath sounds: Normal breath sounds  No wheezing, rhonchi or rales  Abdominal:      General: Bowel sounds are normal       Palpations: Abdomen is soft  There is no mass  Tenderness: There is no abdominal tenderness  Musculoskeletal:         General: No deformity  Comments:  Mild indentation of her socks bilateral lower extremities at the ankles  Lymphadenopathy:      Cervical: No cervical adenopathy  Skin:     General: Skin is warm  Neurological:      General: No focal deficit present  Mental Status: She is alert     Psychiatric:         Mood and Affect: Mood normal

## 2021-02-22 ENCOUNTER — LAB REQUISITION (OUTPATIENT)
Dept: LAB | Facility: HOSPITAL | Age: 61
End: 2021-02-22
Payer: COMMERCIAL

## 2021-02-22 DIAGNOSIS — Z03.818 ENCOUNTER FOR OBSERVATION FOR SUSPECTED EXPOSURE TO OTHER BIOLOGICAL AGENTS RULED OUT: ICD-10-CM

## 2021-02-22 DIAGNOSIS — Z11.59 ENCOUNTER FOR SCREENING FOR OTHER VIRAL DISEASES: ICD-10-CM

## 2021-02-22 PROCEDURE — U0003 INFECTIOUS AGENT DETECTION BY NUCLEIC ACID (DNA OR RNA); SEVERE ACUTE RESPIRATORY SYNDROME CORONAVIRUS 2 (SARS-COV-2) (CORONAVIRUS DISEASE [COVID-19]), AMPLIFIED PROBE TECHNIQUE, MAKING USE OF HIGH THROUGHPUT TECHNOLOGIES AS DESCRIBED BY CMS-2020-01-R: HCPCS | Performed by: FAMILY MEDICINE

## 2021-02-22 PROCEDURE — U0005 INFEC AGEN DETEC AMPLI PROBE: HCPCS | Performed by: FAMILY MEDICINE

## 2021-02-23 LAB — SARS-COV-2 RNA RESP QL NAA+PROBE: NEGATIVE

## 2021-03-01 ENCOUNTER — LAB REQUISITION (OUTPATIENT)
Dept: LAB | Facility: HOSPITAL | Age: 61
End: 2021-03-01
Payer: COMMERCIAL

## 2021-03-01 DIAGNOSIS — Z03.818 ENCOUNTER FOR OBSERVATION FOR SUSPECTED EXPOSURE TO OTHER BIOLOGICAL AGENTS RULED OUT: ICD-10-CM

## 2021-03-01 DIAGNOSIS — Z11.59 ENCOUNTER FOR SCREENING FOR OTHER VIRAL DISEASES: ICD-10-CM

## 2021-03-01 PROCEDURE — U0005 INFEC AGEN DETEC AMPLI PROBE: HCPCS | Performed by: FAMILY MEDICINE

## 2021-03-01 PROCEDURE — U0003 INFECTIOUS AGENT DETECTION BY NUCLEIC ACID (DNA OR RNA); SEVERE ACUTE RESPIRATORY SYNDROME CORONAVIRUS 2 (SARS-COV-2) (CORONAVIRUS DISEASE [COVID-19]), AMPLIFIED PROBE TECHNIQUE, MAKING USE OF HIGH THROUGHPUT TECHNOLOGIES AS DESCRIBED BY CMS-2020-01-R: HCPCS | Performed by: FAMILY MEDICINE

## 2021-03-02 LAB — SARS-COV-2 RNA RESP QL NAA+PROBE: NEGATIVE

## 2021-03-15 ENCOUNTER — LAB REQUISITION (OUTPATIENT)
Dept: LAB | Facility: HOSPITAL | Age: 61
End: 2021-03-15
Payer: COMMERCIAL

## 2021-03-15 DIAGNOSIS — Z11.59 ENCOUNTER FOR SCREENING FOR OTHER VIRAL DISEASES: ICD-10-CM

## 2021-03-15 DIAGNOSIS — Z03.818 ENCOUNTER FOR OBSERVATION FOR SUSPECTED EXPOSURE TO OTHER BIOLOGICAL AGENTS RULED OUT: ICD-10-CM

## 2021-03-15 LAB — SARS-COV-2 RNA RESP QL NAA+PROBE: NEGATIVE

## 2021-03-15 PROCEDURE — U0005 INFEC AGEN DETEC AMPLI PROBE: HCPCS | Performed by: FAMILY MEDICINE

## 2021-03-15 PROCEDURE — U0003 INFECTIOUS AGENT DETECTION BY NUCLEIC ACID (DNA OR RNA); SEVERE ACUTE RESPIRATORY SYNDROME CORONAVIRUS 2 (SARS-COV-2) (CORONAVIRUS DISEASE [COVID-19]), AMPLIFIED PROBE TECHNIQUE, MAKING USE OF HIGH THROUGHPUT TECHNOLOGIES AS DESCRIBED BY CMS-2020-01-R: HCPCS | Performed by: FAMILY MEDICINE

## 2021-03-29 ENCOUNTER — LAB REQUISITION (OUTPATIENT)
Dept: LAB | Facility: HOSPITAL | Age: 61
End: 2021-03-29
Payer: COMMERCIAL

## 2021-03-29 DIAGNOSIS — Z03.818 ENCOUNTER FOR OBSERVATION FOR SUSPECTED EXPOSURE TO OTHER BIOLOGICAL AGENTS RULED OUT: ICD-10-CM

## 2021-03-29 DIAGNOSIS — Z11.59 ENCOUNTER FOR SCREENING FOR OTHER VIRAL DISEASES: ICD-10-CM

## 2021-03-29 LAB — SARS-COV-2 RNA RESP QL NAA+PROBE: NEGATIVE

## 2021-03-29 PROCEDURE — U0005 INFEC AGEN DETEC AMPLI PROBE: HCPCS | Performed by: FAMILY MEDICINE

## 2021-03-29 PROCEDURE — U0003 INFECTIOUS AGENT DETECTION BY NUCLEIC ACID (DNA OR RNA); SEVERE ACUTE RESPIRATORY SYNDROME CORONAVIRUS 2 (SARS-COV-2) (CORONAVIRUS DISEASE [COVID-19]), AMPLIFIED PROBE TECHNIQUE, MAKING USE OF HIGH THROUGHPUT TECHNOLOGIES AS DESCRIBED BY CMS-2020-01-R: HCPCS | Performed by: FAMILY MEDICINE

## 2021-04-05 ENCOUNTER — LAB REQUISITION (OUTPATIENT)
Dept: LAB | Facility: HOSPITAL | Age: 61
End: 2021-04-05
Payer: COMMERCIAL

## 2021-04-05 DIAGNOSIS — Z11.59 ENCOUNTER FOR SCREENING FOR OTHER VIRAL DISEASES: ICD-10-CM

## 2021-04-05 DIAGNOSIS — Z03.818 ENCOUNTER FOR OBSERVATION FOR SUSPECTED EXPOSURE TO OTHER BIOLOGICAL AGENTS RULED OUT: ICD-10-CM

## 2021-04-05 LAB — SARS-COV-2 RNA RESP QL NAA+PROBE: NEGATIVE

## 2021-04-05 PROCEDURE — U0005 INFEC AGEN DETEC AMPLI PROBE: HCPCS | Performed by: FAMILY MEDICINE

## 2021-04-05 PROCEDURE — U0003 INFECTIOUS AGENT DETECTION BY NUCLEIC ACID (DNA OR RNA); SEVERE ACUTE RESPIRATORY SYNDROME CORONAVIRUS 2 (SARS-COV-2) (CORONAVIRUS DISEASE [COVID-19]), AMPLIFIED PROBE TECHNIQUE, MAKING USE OF HIGH THROUGHPUT TECHNOLOGIES AS DESCRIBED BY CMS-2020-01-R: HCPCS | Performed by: FAMILY MEDICINE

## 2021-04-12 PROCEDURE — U0003 INFECTIOUS AGENT DETECTION BY NUCLEIC ACID (DNA OR RNA); SEVERE ACUTE RESPIRATORY SYNDROME CORONAVIRUS 2 (SARS-COV-2) (CORONAVIRUS DISEASE [COVID-19]), AMPLIFIED PROBE TECHNIQUE, MAKING USE OF HIGH THROUGHPUT TECHNOLOGIES AS DESCRIBED BY CMS-2020-01-R: HCPCS | Performed by: FAMILY MEDICINE

## 2021-04-12 PROCEDURE — U0005 INFEC AGEN DETEC AMPLI PROBE: HCPCS | Performed by: FAMILY MEDICINE

## 2021-04-13 ENCOUNTER — LAB REQUISITION (OUTPATIENT)
Dept: LAB | Facility: HOSPITAL | Age: 61
End: 2021-04-13
Payer: COMMERCIAL

## 2021-04-13 DIAGNOSIS — Z11.59 ENCOUNTER FOR SCREENING FOR OTHER VIRAL DISEASES: ICD-10-CM

## 2021-04-13 DIAGNOSIS — Z03.818 ENCOUNTER FOR OBSERVATION FOR SUSPECTED EXPOSURE TO OTHER BIOLOGICAL AGENTS RULED OUT: ICD-10-CM

## 2021-04-13 LAB — SARS-COV-2 RNA RESP QL NAA+PROBE: NEGATIVE

## 2021-04-19 ENCOUNTER — LAB REQUISITION (OUTPATIENT)
Dept: LAB | Facility: HOSPITAL | Age: 61
End: 2021-04-19
Payer: COMMERCIAL

## 2021-04-19 DIAGNOSIS — Z11.59 ENCOUNTER FOR SCREENING FOR OTHER VIRAL DISEASES: ICD-10-CM

## 2021-04-19 DIAGNOSIS — Z03.818 ENCOUNTER FOR OBSERVATION FOR SUSPECTED EXPOSURE TO OTHER BIOLOGICAL AGENTS RULED OUT: ICD-10-CM

## 2021-04-19 LAB — SARS-COV-2 RNA RESP QL NAA+PROBE: NEGATIVE

## 2021-04-19 PROCEDURE — U0005 INFEC AGEN DETEC AMPLI PROBE: HCPCS | Performed by: FAMILY MEDICINE

## 2021-04-19 PROCEDURE — U0003 INFECTIOUS AGENT DETECTION BY NUCLEIC ACID (DNA OR RNA); SEVERE ACUTE RESPIRATORY SYNDROME CORONAVIRUS 2 (SARS-COV-2) (CORONAVIRUS DISEASE [COVID-19]), AMPLIFIED PROBE TECHNIQUE, MAKING USE OF HIGH THROUGHPUT TECHNOLOGIES AS DESCRIBED BY CMS-2020-01-R: HCPCS | Performed by: FAMILY MEDICINE

## 2021-05-03 ENCOUNTER — LAB REQUISITION (OUTPATIENT)
Dept: LAB | Facility: HOSPITAL | Age: 61
End: 2021-05-03
Payer: COMMERCIAL

## 2021-05-03 DIAGNOSIS — Z11.59 ENCOUNTER FOR SCREENING FOR OTHER VIRAL DISEASES: ICD-10-CM

## 2021-05-03 DIAGNOSIS — Z03.818 ENCOUNTER FOR OBSERVATION FOR SUSPECTED EXPOSURE TO OTHER BIOLOGICAL AGENTS RULED OUT: ICD-10-CM

## 2021-05-03 LAB — SARS-COV-2 RNA RESP QL NAA+PROBE: NEGATIVE

## 2021-05-03 PROCEDURE — U0005 INFEC AGEN DETEC AMPLI PROBE: HCPCS | Performed by: FAMILY MEDICINE

## 2021-05-03 PROCEDURE — U0003 INFECTIOUS AGENT DETECTION BY NUCLEIC ACID (DNA OR RNA); SEVERE ACUTE RESPIRATORY SYNDROME CORONAVIRUS 2 (SARS-COV-2) (CORONAVIRUS DISEASE [COVID-19]), AMPLIFIED PROBE TECHNIQUE, MAKING USE OF HIGH THROUGHPUT TECHNOLOGIES AS DESCRIBED BY CMS-2020-01-R: HCPCS | Performed by: FAMILY MEDICINE

## 2021-05-10 ENCOUNTER — LAB REQUISITION (OUTPATIENT)
Dept: LAB | Facility: HOSPITAL | Age: 61
End: 2021-05-10
Payer: COMMERCIAL

## 2021-05-10 DIAGNOSIS — Z11.59 ENCOUNTER FOR SCREENING FOR OTHER VIRAL DISEASES: ICD-10-CM

## 2021-05-10 DIAGNOSIS — Z03.818 ENCOUNTER FOR OBSERVATION FOR SUSPECTED EXPOSURE TO OTHER BIOLOGICAL AGENTS RULED OUT: ICD-10-CM

## 2021-05-10 LAB — SARS-COV-2 RNA RESP QL NAA+PROBE: NEGATIVE

## 2021-05-10 PROCEDURE — U0003 INFECTIOUS AGENT DETECTION BY NUCLEIC ACID (DNA OR RNA); SEVERE ACUTE RESPIRATORY SYNDROME CORONAVIRUS 2 (SARS-COV-2) (CORONAVIRUS DISEASE [COVID-19]), AMPLIFIED PROBE TECHNIQUE, MAKING USE OF HIGH THROUGHPUT TECHNOLOGIES AS DESCRIBED BY CMS-2020-01-R: HCPCS | Performed by: FAMILY MEDICINE

## 2021-05-10 PROCEDURE — U0005 INFEC AGEN DETEC AMPLI PROBE: HCPCS | Performed by: FAMILY MEDICINE

## 2021-05-17 ENCOUNTER — LAB REQUISITION (OUTPATIENT)
Dept: LAB | Facility: HOSPITAL | Age: 61
End: 2021-05-17
Payer: COMMERCIAL

## 2021-05-17 DIAGNOSIS — Z11.59 ENCOUNTER FOR SCREENING FOR OTHER VIRAL DISEASES: ICD-10-CM

## 2021-05-17 DIAGNOSIS — Z03.818 ENCOUNTER FOR OBSERVATION FOR SUSPECTED EXPOSURE TO OTHER BIOLOGICAL AGENTS RULED OUT: ICD-10-CM

## 2021-05-17 PROCEDURE — U0005 INFEC AGEN DETEC AMPLI PROBE: HCPCS | Performed by: FAMILY MEDICINE

## 2021-05-17 PROCEDURE — U0003 INFECTIOUS AGENT DETECTION BY NUCLEIC ACID (DNA OR RNA); SEVERE ACUTE RESPIRATORY SYNDROME CORONAVIRUS 2 (SARS-COV-2) (CORONAVIRUS DISEASE [COVID-19]), AMPLIFIED PROBE TECHNIQUE, MAKING USE OF HIGH THROUGHPUT TECHNOLOGIES AS DESCRIBED BY CMS-2020-01-R: HCPCS | Performed by: FAMILY MEDICINE

## 2021-05-18 LAB — SARS-COV-2 RNA RESP QL NAA+PROBE: NEGATIVE

## 2021-05-21 DIAGNOSIS — E78.2 MIXED HYPERLIPIDEMIA: ICD-10-CM

## 2021-05-21 DIAGNOSIS — F41.9 ANXIETY: ICD-10-CM

## 2021-05-21 DIAGNOSIS — F33.1 MODERATE EPISODE OF RECURRENT MAJOR DEPRESSIVE DISORDER (HCC): ICD-10-CM

## 2021-05-21 DIAGNOSIS — I10 ESSENTIAL HYPERTENSION: ICD-10-CM

## 2021-05-21 RX ORDER — IRBESARTAN 300 MG/1
300 TABLET ORAL
Qty: 90 TABLET | Refills: 0 | Status: SHIPPED | OUTPATIENT
Start: 2021-05-21 | End: 2021-08-26 | Stop reason: SDUPTHER

## 2021-05-21 RX ORDER — ATORVASTATIN CALCIUM 10 MG/1
10 TABLET, FILM COATED ORAL DAILY
Qty: 90 TABLET | Refills: 0 | Status: SHIPPED | OUTPATIENT
Start: 2021-05-21 | End: 2021-08-26 | Stop reason: SDUPTHER

## 2021-05-21 RX ORDER — HYDROCHLOROTHIAZIDE 25 MG/1
25 TABLET ORAL DAILY
Qty: 90 TABLET | Refills: 0 | Status: SHIPPED | OUTPATIENT
Start: 2021-05-21 | End: 2021-08-26 | Stop reason: SDUPTHER

## 2021-05-21 NOTE — TELEPHONE ENCOUNTER
Pt called req refills on her 4 daily medications  She was last seen 2/11/21 and received 90 days with no refills     I sent to provider for approval

## 2021-05-24 ENCOUNTER — LAB REQUISITION (OUTPATIENT)
Dept: LAB | Facility: HOSPITAL | Age: 61
End: 2021-05-24
Payer: COMMERCIAL

## 2021-05-24 DIAGNOSIS — Z11.59 ENCOUNTER FOR SCREENING FOR OTHER VIRAL DISEASES: ICD-10-CM

## 2021-05-24 LAB — SARS-COV-2 RNA RESP QL NAA+PROBE: NEGATIVE

## 2021-05-24 PROCEDURE — U0005 INFEC AGEN DETEC AMPLI PROBE: HCPCS | Performed by: FAMILY MEDICINE

## 2021-05-24 PROCEDURE — U0003 INFECTIOUS AGENT DETECTION BY NUCLEIC ACID (DNA OR RNA); SEVERE ACUTE RESPIRATORY SYNDROME CORONAVIRUS 2 (SARS-COV-2) (CORONAVIRUS DISEASE [COVID-19]), AMPLIFIED PROBE TECHNIQUE, MAKING USE OF HIGH THROUGHPUT TECHNOLOGIES AS DESCRIBED BY CMS-2020-01-R: HCPCS | Performed by: FAMILY MEDICINE

## 2021-05-31 PROCEDURE — U0005 INFEC AGEN DETEC AMPLI PROBE: HCPCS | Performed by: FAMILY MEDICINE

## 2021-05-31 PROCEDURE — U0003 INFECTIOUS AGENT DETECTION BY NUCLEIC ACID (DNA OR RNA); SEVERE ACUTE RESPIRATORY SYNDROME CORONAVIRUS 2 (SARS-COV-2) (CORONAVIRUS DISEASE [COVID-19]), AMPLIFIED PROBE TECHNIQUE, MAKING USE OF HIGH THROUGHPUT TECHNOLOGIES AS DESCRIBED BY CMS-2020-01-R: HCPCS | Performed by: FAMILY MEDICINE

## 2021-06-01 ENCOUNTER — LAB REQUISITION (OUTPATIENT)
Dept: LAB | Facility: HOSPITAL | Age: 61
End: 2021-06-01
Payer: COMMERCIAL

## 2021-06-01 DIAGNOSIS — Z11.59 ENCOUNTER FOR SCREENING FOR OTHER VIRAL DISEASES: ICD-10-CM

## 2021-06-01 LAB — SARS-COV-2 RNA RESP QL NAA+PROBE: NEGATIVE

## 2021-06-08 ENCOUNTER — TELEPHONE (OUTPATIENT)
Dept: FAMILY MEDICINE CLINIC | Facility: CLINIC | Age: 61
End: 2021-06-08

## 2021-06-08 NOTE — TELEPHONE ENCOUNTER
Please call her to complete the fasting lab orders in the system from February in preparation for her appointment on Tuesday June 15th arrival at 1:15 p m  thank you

## 2021-06-15 ENCOUNTER — TELEPHONE (OUTPATIENT)
Dept: FAMILY MEDICINE CLINIC | Facility: CLINIC | Age: 61
End: 2021-06-15

## 2021-06-28 ENCOUNTER — LAB REQUISITION (OUTPATIENT)
Dept: LAB | Facility: HOSPITAL | Age: 61
End: 2021-06-28
Payer: COMMERCIAL

## 2021-06-28 DIAGNOSIS — Z11.59 ENCOUNTER FOR SCREENING FOR OTHER VIRAL DISEASES: ICD-10-CM

## 2021-06-28 DIAGNOSIS — Z03.818 ENCOUNTER FOR OBSERVATION FOR SUSPECTED EXPOSURE TO OTHER BIOLOGICAL AGENTS RULED OUT: ICD-10-CM

## 2021-06-28 LAB — SARS-COV-2 RNA RESP QL NAA+PROBE: NEGATIVE

## 2021-06-28 PROCEDURE — U0005 INFEC AGEN DETEC AMPLI PROBE: HCPCS | Performed by: FAMILY MEDICINE

## 2021-06-28 PROCEDURE — U0003 INFECTIOUS AGENT DETECTION BY NUCLEIC ACID (DNA OR RNA); SEVERE ACUTE RESPIRATORY SYNDROME CORONAVIRUS 2 (SARS-COV-2) (CORONAVIRUS DISEASE [COVID-19]), AMPLIFIED PROBE TECHNIQUE, MAKING USE OF HIGH THROUGHPUT TECHNOLOGIES AS DESCRIBED BY CMS-2020-01-R: HCPCS | Performed by: FAMILY MEDICINE

## 2021-07-19 PROCEDURE — U0003 INFECTIOUS AGENT DETECTION BY NUCLEIC ACID (DNA OR RNA); SEVERE ACUTE RESPIRATORY SYNDROME CORONAVIRUS 2 (SARS-COV-2) (CORONAVIRUS DISEASE [COVID-19]), AMPLIFIED PROBE TECHNIQUE, MAKING USE OF HIGH THROUGHPUT TECHNOLOGIES AS DESCRIBED BY CMS-2020-01-R: HCPCS | Performed by: FAMILY MEDICINE

## 2021-07-19 PROCEDURE — U0005 INFEC AGEN DETEC AMPLI PROBE: HCPCS | Performed by: FAMILY MEDICINE

## 2021-07-20 ENCOUNTER — LAB REQUISITION (OUTPATIENT)
Dept: LAB | Facility: HOSPITAL | Age: 61
End: 2021-07-20
Payer: COMMERCIAL

## 2021-07-20 DIAGNOSIS — Z03.818 ENCOUNTER FOR OBSERVATION FOR SUSPECTED EXPOSURE TO OTHER BIOLOGICAL AGENTS RULED OUT: ICD-10-CM

## 2021-07-20 DIAGNOSIS — Z11.59 ENCOUNTER FOR SCREENING FOR OTHER VIRAL DISEASES: ICD-10-CM

## 2021-07-20 LAB — SARS-COV-2 RNA RESP QL NAA+PROBE: NEGATIVE

## 2021-08-11 PROCEDURE — U0005 INFEC AGEN DETEC AMPLI PROBE: HCPCS | Performed by: FAMILY MEDICINE

## 2021-08-11 PROCEDURE — U0003 INFECTIOUS AGENT DETECTION BY NUCLEIC ACID (DNA OR RNA); SEVERE ACUTE RESPIRATORY SYNDROME CORONAVIRUS 2 (SARS-COV-2) (CORONAVIRUS DISEASE [COVID-19]), AMPLIFIED PROBE TECHNIQUE, MAKING USE OF HIGH THROUGHPUT TECHNOLOGIES AS DESCRIBED BY CMS-2020-01-R: HCPCS | Performed by: FAMILY MEDICINE

## 2021-08-12 ENCOUNTER — LAB REQUISITION (OUTPATIENT)
Dept: LAB | Facility: HOSPITAL | Age: 61
End: 2021-08-12
Payer: COMMERCIAL

## 2021-08-12 DIAGNOSIS — Z11.59 ENCOUNTER FOR SCREENING FOR OTHER VIRAL DISEASES: ICD-10-CM

## 2021-08-12 DIAGNOSIS — Z03.818 ENCOUNTER FOR OBSERVATION FOR SUSPECTED EXPOSURE TO OTHER BIOLOGICAL AGENTS RULED OUT: ICD-10-CM

## 2021-08-13 LAB — SARS-COV-2 RNA RESP QL NAA+PROBE: NEGATIVE

## 2021-08-23 ENCOUNTER — LAB REQUISITION (OUTPATIENT)
Dept: LAB | Facility: HOSPITAL | Age: 61
End: 2021-08-23
Payer: COMMERCIAL

## 2021-08-23 DIAGNOSIS — Z11.59 ENCOUNTER FOR SCREENING FOR OTHER VIRAL DISEASES: ICD-10-CM

## 2021-08-23 DIAGNOSIS — Z03.818 ENCOUNTER FOR OBSERVATION FOR SUSPECTED EXPOSURE TO OTHER BIOLOGICAL AGENTS RULED OUT: ICD-10-CM

## 2021-08-23 LAB — SARS-COV-2 RNA RESP QL NAA+PROBE: NEGATIVE

## 2021-08-23 PROCEDURE — U0005 INFEC AGEN DETEC AMPLI PROBE: HCPCS | Performed by: FAMILY MEDICINE

## 2021-08-23 PROCEDURE — U0003 INFECTIOUS AGENT DETECTION BY NUCLEIC ACID (DNA OR RNA); SEVERE ACUTE RESPIRATORY SYNDROME CORONAVIRUS 2 (SARS-COV-2) (CORONAVIRUS DISEASE [COVID-19]), AMPLIFIED PROBE TECHNIQUE, MAKING USE OF HIGH THROUGHPUT TECHNOLOGIES AS DESCRIBED BY CMS-2020-01-R: HCPCS | Performed by: FAMILY MEDICINE

## 2021-08-26 DIAGNOSIS — I10 ESSENTIAL HYPERTENSION: ICD-10-CM

## 2021-08-26 DIAGNOSIS — F41.9 ANXIETY: ICD-10-CM

## 2021-08-26 DIAGNOSIS — E78.2 MIXED HYPERLIPIDEMIA: ICD-10-CM

## 2021-08-26 DIAGNOSIS — F33.1 MODERATE EPISODE OF RECURRENT MAJOR DEPRESSIVE DISORDER (HCC): ICD-10-CM

## 2021-08-26 RX ORDER — HYDROCHLOROTHIAZIDE 25 MG/1
25 TABLET ORAL DAILY
Qty: 14 TABLET | Refills: 0 | Status: SHIPPED | OUTPATIENT
Start: 2021-08-26 | End: 2021-08-30 | Stop reason: SDUPTHER

## 2021-08-26 RX ORDER — IRBESARTAN 300 MG/1
300 TABLET ORAL
Qty: 14 TABLET | Refills: 0 | Status: SHIPPED | OUTPATIENT
Start: 2021-08-26 | End: 2021-08-30 | Stop reason: SDUPTHER

## 2021-08-26 RX ORDER — ATORVASTATIN CALCIUM 10 MG/1
10 TABLET, FILM COATED ORAL DAILY
Qty: 14 TABLET | Refills: 0 | Status: SHIPPED | OUTPATIENT
Start: 2021-08-26 | End: 2021-08-30 | Stop reason: SDUPTHER

## 2021-08-26 NOTE — TELEPHONE ENCOUNTER
PT had an appt with Kirsten Ta today 8/26 and r/s with [facility] to stay with this practice for Monday but needs a refill today of her meds Sertraline, irbesartan, Hydrochlorothiazide and atorvastatin

## 2021-08-27 ENCOUNTER — TELEPHONE (OUTPATIENT)
Dept: FAMILY MEDICINE CLINIC | Facility: CLINIC | Age: 61
End: 2021-08-27

## 2021-08-27 NOTE — TELEPHONE ENCOUNTER
Evaus Christiansen has an appt on Monday with [facility] & she is wondering if she wants to order bw? There is an order in the computer,but, it is from 2/16/21 is that still good? Please advise pt  She is planning on going tomorrow morning to get the bw

## 2021-08-30 ENCOUNTER — APPOINTMENT (OUTPATIENT)
Dept: LAB | Facility: IMAGING CENTER | Age: 61
End: 2021-08-30
Payer: COMMERCIAL

## 2021-08-30 ENCOUNTER — OFFICE VISIT (OUTPATIENT)
Dept: FAMILY MEDICINE CLINIC | Facility: CLINIC | Age: 61
End: 2021-08-30
Payer: COMMERCIAL

## 2021-08-30 VITALS
OXYGEN SATURATION: 95 % | HEART RATE: 86 BPM | TEMPERATURE: 98.2 F | HEIGHT: 62 IN | SYSTOLIC BLOOD PRESSURE: 148 MMHG | WEIGHT: 191 LBS | DIASTOLIC BLOOD PRESSURE: 88 MMHG | BODY MASS INDEX: 35.15 KG/M2 | RESPIRATION RATE: 16 BRPM

## 2021-08-30 DIAGNOSIS — F41.9 ANXIETY: ICD-10-CM

## 2021-08-30 DIAGNOSIS — I10 ESSENTIAL HYPERTENSION: Primary | ICD-10-CM

## 2021-08-30 DIAGNOSIS — E78.2 MIXED HYPERLIPIDEMIA: ICD-10-CM

## 2021-08-30 DIAGNOSIS — L30.9 DERMATITIS: ICD-10-CM

## 2021-08-30 DIAGNOSIS — M79.672 BILATERAL FOOT PAIN: ICD-10-CM

## 2021-08-30 DIAGNOSIS — F33.1 MODERATE EPISODE OF RECURRENT MAJOR DEPRESSIVE DISORDER (HCC): ICD-10-CM

## 2021-08-30 DIAGNOSIS — M79.671 BILATERAL FOOT PAIN: ICD-10-CM

## 2021-08-30 DIAGNOSIS — I10 ESSENTIAL HYPERTENSION: ICD-10-CM

## 2021-08-30 DIAGNOSIS — R05.3 CHRONIC COUGH: ICD-10-CM

## 2021-08-30 LAB
ALBUMIN SERPL BCP-MCNC: 3.8 G/DL (ref 3.5–5)
ALP SERPL-CCNC: 119 U/L (ref 46–116)
ALT SERPL W P-5'-P-CCNC: 23 U/L (ref 12–78)
ANION GAP SERPL CALCULATED.3IONS-SCNC: 4 MMOL/L (ref 4–13)
AST SERPL W P-5'-P-CCNC: 15 U/L (ref 5–45)
BASOPHILS # BLD AUTO: 0.05 THOUSANDS/ΜL (ref 0–0.1)
BASOPHILS NFR BLD AUTO: 1 % (ref 0–1)
BILIRUB SERPL-MCNC: 0.29 MG/DL (ref 0.2–1)
BUN SERPL-MCNC: 17 MG/DL (ref 5–25)
CALCIUM SERPL-MCNC: 9.1 MG/DL (ref 8.3–10.1)
CHLORIDE SERPL-SCNC: 102 MMOL/L (ref 100–108)
CHOLEST SERPL-MCNC: 182 MG/DL (ref 50–200)
CO2 SERPL-SCNC: 27 MMOL/L (ref 21–32)
CREAT SERPL-MCNC: 1 MG/DL (ref 0.6–1.3)
CREAT UR-MCNC: 108 MG/DL
EOSINOPHIL # BLD AUTO: 0.21 THOUSAND/ΜL (ref 0–0.61)
EOSINOPHIL NFR BLD AUTO: 3 % (ref 0–6)
ERYTHROCYTE [DISTWIDTH] IN BLOOD BY AUTOMATED COUNT: 14.6 % (ref 11.6–15.1)
GFR SERPL CREATININE-BSD FRML MDRD: 61 ML/MIN/1.73SQ M
GLUCOSE P FAST SERPL-MCNC: 84 MG/DL (ref 65–99)
HCT VFR BLD AUTO: 36.5 % (ref 34.8–46.1)
HDLC SERPL-MCNC: 63 MG/DL
HGB BLD-MCNC: 11.5 G/DL (ref 11.5–15.4)
IMM GRANULOCYTES # BLD AUTO: 0.01 THOUSAND/UL (ref 0–0.2)
IMM GRANULOCYTES NFR BLD AUTO: 0 % (ref 0–2)
LDLC SERPL CALC-MCNC: 99 MG/DL (ref 0–100)
LYMPHOCYTES # BLD AUTO: 1.3 THOUSANDS/ΜL (ref 0.6–4.47)
LYMPHOCYTES NFR BLD AUTO: 20 % (ref 14–44)
MCH RBC QN AUTO: 28.8 PG (ref 26.8–34.3)
MCHC RBC AUTO-ENTMCNC: 31.5 G/DL (ref 31.4–37.4)
MCV RBC AUTO: 91 FL (ref 82–98)
MICROALBUMIN UR-MCNC: 8.2 MG/L (ref 0–20)
MICROALBUMIN/CREAT 24H UR: 8 MG/G CREATININE (ref 0–30)
MONOCYTES # BLD AUTO: 0.47 THOUSAND/ΜL (ref 0.17–1.22)
MONOCYTES NFR BLD AUTO: 7 % (ref 4–12)
NEUTROPHILS # BLD AUTO: 4.34 THOUSANDS/ΜL (ref 1.85–7.62)
NEUTS SEG NFR BLD AUTO: 69 % (ref 43–75)
NONHDLC SERPL-MCNC: 119 MG/DL
NRBC BLD AUTO-RTO: 0 /100 WBCS
PLATELET # BLD AUTO: 288 THOUSANDS/UL (ref 149–390)
PMV BLD AUTO: 9.6 FL (ref 8.9–12.7)
POTASSIUM SERPL-SCNC: 4.1 MMOL/L (ref 3.5–5.3)
PROT SERPL-MCNC: 7.9 G/DL (ref 6.4–8.2)
RBC # BLD AUTO: 4 MILLION/UL (ref 3.81–5.12)
SODIUM SERPL-SCNC: 133 MMOL/L (ref 136–145)
TRIGL SERPL-MCNC: 102 MG/DL
TSH SERPL DL<=0.05 MIU/L-ACNC: 1.81 UIU/ML (ref 0.36–3.74)
WBC # BLD AUTO: 6.38 THOUSAND/UL (ref 4.31–10.16)

## 2021-08-30 PROCEDURE — U0003 INFECTIOUS AGENT DETECTION BY NUCLEIC ACID (DNA OR RNA); SEVERE ACUTE RESPIRATORY SYNDROME CORONAVIRUS 2 (SARS-COV-2) (CORONAVIRUS DISEASE [COVID-19]), AMPLIFIED PROBE TECHNIQUE, MAKING USE OF HIGH THROUGHPUT TECHNOLOGIES AS DESCRIBED BY CMS-2020-01-R: HCPCS | Performed by: FAMILY MEDICINE

## 2021-08-30 PROCEDURE — 80061 LIPID PANEL: CPT | Performed by: PHYSICIAN ASSISTANT

## 2021-08-30 PROCEDURE — 80053 COMPREHEN METABOLIC PANEL: CPT | Performed by: PHYSICIAN ASSISTANT

## 2021-08-30 PROCEDURE — 99214 OFFICE O/P EST MOD 30 MIN: CPT | Performed by: NURSE PRACTITIONER

## 2021-08-30 PROCEDURE — 82043 UR ALBUMIN QUANTITATIVE: CPT

## 2021-08-30 PROCEDURE — U0005 INFEC AGEN DETEC AMPLI PROBE: HCPCS | Performed by: FAMILY MEDICINE

## 2021-08-30 PROCEDURE — 4004F PT TOBACCO SCREEN RCVD TLK: CPT | Performed by: NURSE PRACTITIONER

## 2021-08-30 PROCEDURE — 3077F SYST BP >= 140 MM HG: CPT | Performed by: NURSE PRACTITIONER

## 2021-08-30 PROCEDURE — 85025 COMPLETE CBC W/AUTO DIFF WBC: CPT

## 2021-08-30 PROCEDURE — 82570 ASSAY OF URINE CREATININE: CPT

## 2021-08-30 PROCEDURE — 3008F BODY MASS INDEX DOCD: CPT | Performed by: NURSE PRACTITIONER

## 2021-08-30 PROCEDURE — 3079F DIAST BP 80-89 MM HG: CPT | Performed by: NURSE PRACTITIONER

## 2021-08-30 PROCEDURE — 84443 ASSAY THYROID STIM HORMONE: CPT

## 2021-08-30 PROCEDURE — 36415 COLL VENOUS BLD VENIPUNCTURE: CPT | Performed by: PHYSICIAN ASSISTANT

## 2021-08-30 RX ORDER — SENNOSIDES 8.6 MG
650 CAPSULE ORAL EVERY 8 HOURS PRN
Qty: 30 TABLET | Refills: 3 | Status: SHIPPED | OUTPATIENT
Start: 2021-08-30

## 2021-08-30 RX ORDER — IRBESARTAN 300 MG/1
300 TABLET ORAL
Qty: 30 TABLET | Refills: 3 | Status: SHIPPED | OUTPATIENT
Start: 2021-08-30 | End: 2021-12-13 | Stop reason: SDUPTHER

## 2021-08-30 RX ORDER — TRIAMCINOLONE ACETONIDE 1 MG/ML
LOTION TOPICAL 3 TIMES DAILY PRN
Qty: 60 ML | Refills: 0 | Status: SHIPPED | OUTPATIENT
Start: 2021-08-30

## 2021-08-30 RX ORDER — ALBUTEROL SULFATE 90 UG/1
2 AEROSOL, METERED RESPIRATORY (INHALATION) EVERY 6 HOURS PRN
Qty: 18 G | Refills: 1 | Status: SHIPPED | OUTPATIENT
Start: 2021-08-30

## 2021-08-30 RX ORDER — ATORVASTATIN CALCIUM 10 MG/1
10 TABLET, FILM COATED ORAL DAILY
Qty: 30 TABLET | Refills: 3 | Status: SHIPPED | OUTPATIENT
Start: 2021-08-30 | End: 2021-12-13 | Stop reason: SDUPTHER

## 2021-08-30 RX ORDER — HYDROCHLOROTHIAZIDE 25 MG/1
25 TABLET ORAL DAILY
Qty: 30 TABLET | Refills: 3 | Status: SHIPPED | OUTPATIENT
Start: 2021-08-30

## 2021-08-30 NOTE — ASSESSMENT & PLAN NOTE
-  Advised to get lipid panel drawn today  -  Continue Lipitor 10 mg daily  Will adjust medication as necessary   -  Discussed low-fat diet regular exercise  -  Will continue to monitor

## 2021-08-30 NOTE — PROGRESS NOTES
Assessment/Plan:    Essential hypertension  - Not well controlled today  Patient just restarted taking her medications  -  She does check her blood pressure at work  She was advised to continue checking it at work  If it remains above 140/90, contact office   -  Recommend follow-up in 1 month  Anxiety  -  Stable on Zoloft 50 mg daily  Continue same   -  Will continue to monitor  Bilateral foot pain  -  Proceed with x-rays that were ordered in February   - She is going to make an appointment with Podiatry  -  Will continue to follow-up  Major depressive disorder  -  Stable  Continue same  Will continue to monitor  Mixed hyperlipidemia  -  Advised to get lipid panel drawn today  -  Continue Lipitor 10 mg daily  Will adjust medication as necessary   -  Discussed low-fat diet regular exercise  -  Will continue to monitor  Dermatitis  -  Prescription sent for Kenalog cream to be used 3 times daily as needed  -  She was advised to use unscented lotion to her hands to see if this makes a difference  -  If no improvement, consider referral to Dermatology  Diagnoses and all orders for this visit:    Essential hypertension  -     CBC and differential; Future  -     TSH, 3rd generation with Free T4 reflex; Future  -     irbesartan (AVAPRO) 300 mg tablet; Take 1 tablet (300 mg total) by mouth daily at bedtime  -     hydrochlorothiazide (HYDRODIURIL) 25 mg tablet; Take 1 tablet (25 mg total) by mouth daily    Dermatitis  -     CBC and differential; Future  -     triamcinolone (KENALOG) 0 1 % lotion; Apply topically 3 (three) times a day as needed for irritation    Mixed hyperlipidemia  -     CBC and differential; Future  -     TSH, 3rd generation with Free T4 reflex; Future  -     atorvastatin (LIPITOR) 10 mg tablet; Take 1 tablet (10 mg total) by mouth daily    Anxiety  -     sertraline (ZOLOFT) 50 mg tablet;  Take 1 tablet (50 mg total) by mouth daily    Moderate episode of recurrent major depressive disorder (HCC)  -     sertraline (ZOLOFT) 50 mg tablet; Take 1 tablet (50 mg total) by mouth daily    Mixed hyperlipidemia  Comments:  Started on atorvastatin  Will recheck lipid panel in 3 months  Orders:  -     CBC and differential; Future  -     TSH, 3rd generation with Free T4 reflex; Future  -     atorvastatin (LIPITOR) 10 mg tablet; Take 1 tablet (10 mg total) by mouth daily    Chronic cough  -     albuterol (Ventolin HFA) 90 mcg/act inhaler; Inhale 2 puffs every 6 (six) hours as needed for wheezing    Bilateral foot pain  -     acetaminophen (TYLENOL) 650 mg CR tablet; Take 1 tablet (650 mg total) by mouth every 8 (eight) hours as needed for mild pain        Subjective:      Patient ID: Juan Boogie is a 62 y o  female  Patient presents today for follow-up appointment regarding hypertension, hyperlipidemia, and anxiety  She states that she was out of her medications for the past week and she just started retaking them  She has been dealing with bilateral foot pain for the past year  She was supposed to get x-rays done of her feet prior to seeing Podiatry but she never had them completed  She states that she works on her feet a lot and the tops of her feet hurt  She also has bunions  She takes over-the-counter Advil which provides mild relief  She is also complaining of her bilateral hands itching and peeling  She states this is been going on for the past year  She thought it was chemicals that she was in contact with at work  She has not changed any lotions, creams, or detergents  She has never seen a dermatologist     M*Modal software was used to dictate this note  It may contain errors with dictating incorrect words/spelling  Please contact provider directly for any questions           The following portions of the patient's history were reviewed and updated as appropriate: allergies, current medications, past family history, past medical history, past social history, past surgical history and problem list     Review of Systems   Constitutional: Negative for fatigue and fever  HENT: Negative for trouble swallowing  Eyes: Negative for visual disturbance  Respiratory: Negative for cough and shortness of breath  Cardiovascular: Negative for chest pain and palpitations  Gastrointestinal: Negative for abdominal pain and blood in stool  Endocrine: Negative for cold intolerance and heat intolerance  Genitourinary: Negative for difficulty urinating and dysuria  Musculoskeletal: Positive for arthralgias (  Bilateral foot pain)  Negative for gait problem  Skin: Positive for color change (itching and peeling of her bilateral hands)  Negative for rash  Neurological: Negative for dizziness, syncope and headaches  Hematological: Negative for adenopathy  Psychiatric/Behavioral: Negative for behavioral problems  Objective:      /88 (BP Location: Left arm, Patient Position: Sitting, Cuff Size: Large)   Pulse 86   Temp 98 2 °F (36 8 °C) (Tympanic)   Resp 16   Ht 5' 2" (1 575 m)   Wt 86 6 kg (191 lb)   SpO2 95%   BMI 34 93 kg/m²          Physical Exam  Vitals and nursing note reviewed  Constitutional:       Appearance: Normal appearance  HENT:      Head: Normocephalic and atraumatic  Right Ear: External ear normal       Left Ear: External ear normal    Eyes:      Conjunctiva/sclera: Conjunctivae normal    Cardiovascular:      Rate and Rhythm: Normal rate and regular rhythm  Heart sounds: Normal heart sounds  Pulmonary:      Effort: Pulmonary effort is normal       Breath sounds: Normal breath sounds  Musculoskeletal:         General: Normal range of motion  Cervical back: Normal range of motion  Skin:     General: Skin is warm and dry  Findings: Erythema ( erythema and peeling to bilateral palms) present  Neurological:      Mental Status: She is alert and oriented to person, place, and time        Cranial Nerves: No cranial nerve deficit  Psychiatric:         Mood and Affect: Mood normal          Behavior: Behavior normal        BMI Counseling: Body mass index is 34 93 kg/m²  The BMI is above normal  Nutrition recommendations include decreasing portion sizes, encouraging healthy choices of fruits and vegetables, decreasing fast food intake and reducing intake of cholesterol  Exercise recommendations include moderate physical activity 150 minutes/week and exercising 3-5 times per week

## 2021-08-30 NOTE — ASSESSMENT & PLAN NOTE
-  Prescription sent for Kenalog cream to be used 3 times daily as needed  -  She was advised to use unscented lotion to her hands to see if this makes a difference  -  If no improvement, consider referral to Dermatology

## 2021-08-30 NOTE — ASSESSMENT & PLAN NOTE
- Not well controlled today  Patient just restarted taking her medications  -  She does check her blood pressure at work  She was advised to continue checking it at work  If it remains above 140/90, contact office   -  Recommend follow-up in 1 month

## 2021-08-30 NOTE — ASSESSMENT & PLAN NOTE
-  Proceed with x-rays that were ordered in February   - She is going to make an appointment with Podiatry  -  Will continue to follow-up

## 2021-08-31 ENCOUNTER — LAB REQUISITION (OUTPATIENT)
Dept: LAB | Facility: HOSPITAL | Age: 61
End: 2021-08-31
Payer: COMMERCIAL

## 2021-08-31 ENCOUNTER — TELEPHONE (OUTPATIENT)
Dept: FAMILY MEDICINE CLINIC | Facility: CLINIC | Age: 61
End: 2021-08-31

## 2021-08-31 ENCOUNTER — HOSPITAL ENCOUNTER (OUTPATIENT)
Dept: RADIOLOGY | Facility: IMAGING CENTER | Age: 61
Discharge: HOME | End: 2021-08-31
Payer: COMMERCIAL

## 2021-08-31 DIAGNOSIS — Z11.59 ENCOUNTER FOR SCREENING FOR OTHER VIRAL DISEASES: ICD-10-CM

## 2021-08-31 DIAGNOSIS — Z03.818 ENCOUNTER FOR OBSERVATION FOR SUSPECTED EXPOSURE TO OTHER BIOLOGICAL AGENTS RULED OUT: ICD-10-CM

## 2021-08-31 DIAGNOSIS — M79.671 BILATERAL FOOT PAIN: ICD-10-CM

## 2021-08-31 DIAGNOSIS — M79.672 BILATERAL FOOT PAIN: ICD-10-CM

## 2021-08-31 LAB — SARS-COV-2 RNA RESP QL NAA+PROBE: NEGATIVE

## 2021-08-31 PROCEDURE — 73630 X-RAY EXAM OF FOOT: CPT

## 2021-08-31 NOTE — TELEPHONE ENCOUNTER
----- [id number] at 8/31/2021  7:55 AM EDT -----  All of her labs are stable

## 2021-09-06 PROCEDURE — U0003 INFECTIOUS AGENT DETECTION BY NUCLEIC ACID (DNA OR RNA); SEVERE ACUTE RESPIRATORY SYNDROME CORONAVIRUS 2 (SARS-COV-2) (CORONAVIRUS DISEASE [COVID-19]), AMPLIFIED PROBE TECHNIQUE, MAKING USE OF HIGH THROUGHPUT TECHNOLOGIES AS DESCRIBED BY CMS-2020-01-R: HCPCS | Performed by: FAMILY MEDICINE

## 2021-09-06 PROCEDURE — U0005 INFEC AGEN DETEC AMPLI PROBE: HCPCS | Performed by: FAMILY MEDICINE

## 2021-09-07 ENCOUNTER — LAB REQUISITION (OUTPATIENT)
Dept: LAB | Facility: HOSPITAL | Age: 61
End: 2021-09-07
Payer: COMMERCIAL

## 2021-09-07 DIAGNOSIS — Z03.818 ENCOUNTER FOR OBSERVATION FOR SUSPECTED EXPOSURE TO OTHER BIOLOGICAL AGENTS RULED OUT: ICD-10-CM

## 2021-09-07 DIAGNOSIS — Z11.59 ENCOUNTER FOR SCREENING FOR OTHER VIRAL DISEASES: ICD-10-CM

## 2021-09-07 LAB — SARS-COV-2 RNA RESP QL NAA+PROBE: NEGATIVE

## 2021-09-09 ENCOUNTER — TELEPHONE (OUTPATIENT)
Dept: FAMILY MEDICINE CLINIC | Facility: CLINIC | Age: 61
End: 2021-09-09

## 2021-09-09 NOTE — TELEPHONE ENCOUNTER
----- Message from Rogelio Bhatia PA-C sent at 9/7/2021  7:59 AM EDT -----  Please let her know that her bilateral foot x-rays do reveal bunions with arthritis of the great toe  She is scheduled to see Mariusz Lester 9/28   Thanks

## 2021-09-28 ENCOUNTER — TELEPHONE (OUTPATIENT)
Dept: FAMILY MEDICINE CLINIC | Facility: CLINIC | Age: 61
End: 2021-09-28

## 2021-11-22 ENCOUNTER — LAB REQUISITION (OUTPATIENT)
Dept: LAB | Facility: HOSPITAL | Age: 61
End: 2021-11-22
Payer: COMMERCIAL

## 2021-11-22 DIAGNOSIS — Z03.818 ENCOUNTER FOR OBSERVATION FOR SUSPECTED EXPOSURE TO OTHER BIOLOGICAL AGENTS RULED OUT: ICD-10-CM

## 2021-11-22 DIAGNOSIS — Z11.59 ENCOUNTER FOR SCREENING FOR OTHER VIRAL DISEASES: ICD-10-CM

## 2021-11-22 LAB — SARS-COV-2 RNA RESP QL NAA+PROBE: NEGATIVE

## 2021-11-22 PROCEDURE — U0003 INFECTIOUS AGENT DETECTION BY NUCLEIC ACID (DNA OR RNA); SEVERE ACUTE RESPIRATORY SYNDROME CORONAVIRUS 2 (SARS-COV-2) (CORONAVIRUS DISEASE [COVID-19]), AMPLIFIED PROBE TECHNIQUE, MAKING USE OF HIGH THROUGHPUT TECHNOLOGIES AS DESCRIBED BY CMS-2020-01-R: HCPCS | Performed by: FAMILY MEDICINE

## 2021-11-22 PROCEDURE — U0005 INFEC AGEN DETEC AMPLI PROBE: HCPCS | Performed by: FAMILY MEDICINE

## 2021-12-13 DIAGNOSIS — E78.2 MIXED HYPERLIPIDEMIA: ICD-10-CM

## 2021-12-13 DIAGNOSIS — F41.9 ANXIETY: ICD-10-CM

## 2021-12-13 DIAGNOSIS — I10 ESSENTIAL HYPERTENSION: ICD-10-CM

## 2021-12-13 DIAGNOSIS — F33.1 MODERATE EPISODE OF RECURRENT MAJOR DEPRESSIVE DISORDER (HCC): ICD-10-CM

## 2021-12-13 RX ORDER — IRBESARTAN 300 MG/1
300 TABLET ORAL
Qty: 30 TABLET | Refills: 3 | Status: SHIPPED | OUTPATIENT
Start: 2021-12-13 | End: 2022-04-25 | Stop reason: SDUPTHER

## 2021-12-13 RX ORDER — ATORVASTATIN CALCIUM 10 MG/1
10 TABLET, FILM COATED ORAL DAILY
Qty: 30 TABLET | Refills: 3 | Status: SHIPPED | OUTPATIENT
Start: 2021-12-13 | End: 2022-04-25 | Stop reason: SDUPTHER

## 2021-12-21 ENCOUNTER — LAB REQUISITION (OUTPATIENT)
Dept: LAB | Facility: HOSPITAL | Age: 61
End: 2021-12-21
Payer: COMMERCIAL

## 2021-12-21 DIAGNOSIS — Z11.59 ENCOUNTER FOR SCREENING FOR OTHER VIRAL DISEASES: ICD-10-CM

## 2021-12-21 DIAGNOSIS — Z03.818 ENCOUNTER FOR OBSERVATION FOR SUSPECTED EXPOSURE TO OTHER BIOLOGICAL AGENTS RULED OUT: ICD-10-CM

## 2021-12-21 LAB — SARS-COV-2 RNA RESP QL NAA+PROBE: NEGATIVE

## 2021-12-21 PROCEDURE — U0003 INFECTIOUS AGENT DETECTION BY NUCLEIC ACID (DNA OR RNA); SEVERE ACUTE RESPIRATORY SYNDROME CORONAVIRUS 2 (SARS-COV-2) (CORONAVIRUS DISEASE [COVID-19]), AMPLIFIED PROBE TECHNIQUE, MAKING USE OF HIGH THROUGHPUT TECHNOLOGIES AS DESCRIBED BY CMS-2020-01-R: HCPCS | Performed by: FAMILY MEDICINE

## 2021-12-21 PROCEDURE — U0005 INFEC AGEN DETEC AMPLI PROBE: HCPCS | Performed by: FAMILY MEDICINE

## 2022-01-17 ENCOUNTER — LAB REQUISITION (OUTPATIENT)
Dept: LAB | Facility: HOSPITAL | Age: 62
End: 2022-01-17
Payer: COMMERCIAL

## 2022-01-17 DIAGNOSIS — Z03.818 ENCOUNTER FOR OBSERVATION FOR SUSPECTED EXPOSURE TO OTHER BIOLOGICAL AGENTS RULED OUT: ICD-10-CM

## 2022-01-17 DIAGNOSIS — Z11.59 ENCOUNTER FOR SCREENING FOR OTHER VIRAL DISEASES: ICD-10-CM

## 2022-01-17 LAB — SARS-COV-2 RNA RESP QL NAA+PROBE: NEGATIVE

## 2022-01-17 PROCEDURE — U0003 INFECTIOUS AGENT DETECTION BY NUCLEIC ACID (DNA OR RNA); SEVERE ACUTE RESPIRATORY SYNDROME CORONAVIRUS 2 (SARS-COV-2) (CORONAVIRUS DISEASE [COVID-19]), AMPLIFIED PROBE TECHNIQUE, MAKING USE OF HIGH THROUGHPUT TECHNOLOGIES AS DESCRIBED BY CMS-2020-01-R: HCPCS | Performed by: FAMILY MEDICINE

## 2022-01-17 PROCEDURE — U0005 INFEC AGEN DETEC AMPLI PROBE: HCPCS | Performed by: FAMILY MEDICINE

## 2022-01-24 ENCOUNTER — LAB REQUISITION (OUTPATIENT)
Dept: LAB | Facility: HOSPITAL | Age: 62
End: 2022-01-24
Payer: COMMERCIAL

## 2022-01-24 DIAGNOSIS — Z03.818 ENCOUNTER FOR OBSERVATION FOR SUSPECTED EXPOSURE TO OTHER BIOLOGICAL AGENTS RULED OUT: ICD-10-CM

## 2022-01-24 DIAGNOSIS — Z11.59 ENCOUNTER FOR SCREENING FOR OTHER VIRAL DISEASES: ICD-10-CM

## 2022-01-24 LAB — SARS-COV-2 RNA RESP QL NAA+PROBE: NEGATIVE

## 2022-01-24 PROCEDURE — U0003 INFECTIOUS AGENT DETECTION BY NUCLEIC ACID (DNA OR RNA); SEVERE ACUTE RESPIRATORY SYNDROME CORONAVIRUS 2 (SARS-COV-2) (CORONAVIRUS DISEASE [COVID-19]), AMPLIFIED PROBE TECHNIQUE, MAKING USE OF HIGH THROUGHPUT TECHNOLOGIES AS DESCRIBED BY CMS-2020-01-R: HCPCS | Performed by: FAMILY MEDICINE

## 2022-01-24 PROCEDURE — U0005 INFEC AGEN DETEC AMPLI PROBE: HCPCS | Performed by: FAMILY MEDICINE

## 2022-01-31 PROCEDURE — U0005 INFEC AGEN DETEC AMPLI PROBE: HCPCS | Performed by: FAMILY MEDICINE

## 2022-01-31 PROCEDURE — U0003 INFECTIOUS AGENT DETECTION BY NUCLEIC ACID (DNA OR RNA); SEVERE ACUTE RESPIRATORY SYNDROME CORONAVIRUS 2 (SARS-COV-2) (CORONAVIRUS DISEASE [COVID-19]), AMPLIFIED PROBE TECHNIQUE, MAKING USE OF HIGH THROUGHPUT TECHNOLOGIES AS DESCRIBED BY CMS-2020-01-R: HCPCS | Performed by: FAMILY MEDICINE

## 2022-02-01 ENCOUNTER — LAB REQUISITION (OUTPATIENT)
Dept: LAB | Facility: HOSPITAL | Age: 62
End: 2022-02-01
Payer: COMMERCIAL

## 2022-02-01 DIAGNOSIS — Z11.59 ENCOUNTER FOR SCREENING FOR OTHER VIRAL DISEASES: ICD-10-CM

## 2022-02-01 DIAGNOSIS — Z03.818 ENCOUNTER FOR OBSERVATION FOR SUSPECTED EXPOSURE TO OTHER BIOLOGICAL AGENTS RULED OUT: ICD-10-CM

## 2022-02-01 LAB — SARS-COV-2 RNA RESP QL NAA+PROBE: NEGATIVE

## 2022-02-07 ENCOUNTER — LAB REQUISITION (OUTPATIENT)
Dept: LAB | Facility: HOSPITAL | Age: 62
End: 2022-02-07
Payer: COMMERCIAL

## 2022-02-07 DIAGNOSIS — Z11.59 ENCOUNTER FOR SCREENING FOR OTHER VIRAL DISEASES: ICD-10-CM

## 2022-02-07 PROCEDURE — U0003 INFECTIOUS AGENT DETECTION BY NUCLEIC ACID (DNA OR RNA); SEVERE ACUTE RESPIRATORY SYNDROME CORONAVIRUS 2 (SARS-COV-2) (CORONAVIRUS DISEASE [COVID-19]), AMPLIFIED PROBE TECHNIQUE, MAKING USE OF HIGH THROUGHPUT TECHNOLOGIES AS DESCRIBED BY CMS-2020-01-R: HCPCS | Performed by: FAMILY MEDICINE

## 2022-02-07 PROCEDURE — U0005 INFEC AGEN DETEC AMPLI PROBE: HCPCS | Performed by: FAMILY MEDICINE

## 2022-02-08 LAB — SARS-COV-2 RNA RESP QL NAA+PROBE: NEGATIVE

## 2022-02-14 ENCOUNTER — LAB REQUISITION (OUTPATIENT)
Dept: LAB | Facility: HOSPITAL | Age: 62
End: 2022-02-14
Payer: COMMERCIAL

## 2022-02-14 DIAGNOSIS — Z03.818 ENCOUNTER FOR OBSERVATION FOR SUSPECTED EXPOSURE TO OTHER BIOLOGICAL AGENTS RULED OUT: ICD-10-CM

## 2022-02-14 DIAGNOSIS — Z11.59 ENCOUNTER FOR SCREENING FOR OTHER VIRAL DISEASES: ICD-10-CM

## 2022-02-14 LAB — SARS-COV-2 RNA RESP QL NAA+PROBE: NEGATIVE

## 2022-02-14 PROCEDURE — U0005 INFEC AGEN DETEC AMPLI PROBE: HCPCS | Performed by: FAMILY MEDICINE

## 2022-02-14 PROCEDURE — U0003 INFECTIOUS AGENT DETECTION BY NUCLEIC ACID (DNA OR RNA); SEVERE ACUTE RESPIRATORY SYNDROME CORONAVIRUS 2 (SARS-COV-2) (CORONAVIRUS DISEASE [COVID-19]), AMPLIFIED PROBE TECHNIQUE, MAKING USE OF HIGH THROUGHPUT TECHNOLOGIES AS DESCRIBED BY CMS-2020-01-R: HCPCS | Performed by: FAMILY MEDICINE

## 2022-02-28 PROCEDURE — U0005 INFEC AGEN DETEC AMPLI PROBE: HCPCS | Performed by: FAMILY MEDICINE

## 2022-02-28 PROCEDURE — U0003 INFECTIOUS AGENT DETECTION BY NUCLEIC ACID (DNA OR RNA); SEVERE ACUTE RESPIRATORY SYNDROME CORONAVIRUS 2 (SARS-COV-2) (CORONAVIRUS DISEASE [COVID-19]), AMPLIFIED PROBE TECHNIQUE, MAKING USE OF HIGH THROUGHPUT TECHNOLOGIES AS DESCRIBED BY CMS-2020-01-R: HCPCS | Performed by: FAMILY MEDICINE

## 2022-03-01 ENCOUNTER — LAB REQUISITION (OUTPATIENT)
Dept: LAB | Facility: HOSPITAL | Age: 62
End: 2022-03-01
Payer: COMMERCIAL

## 2022-03-01 DIAGNOSIS — Z11.59 ENCOUNTER FOR SCREENING FOR OTHER VIRAL DISEASES: ICD-10-CM

## 2022-03-01 DIAGNOSIS — Z03.818 ENCOUNTER FOR OBSERVATION FOR SUSPECTED EXPOSURE TO OTHER BIOLOGICAL AGENTS RULED OUT: ICD-10-CM

## 2022-03-01 LAB — SARS-COV-2 RNA RESP QL NAA+PROBE: NEGATIVE

## 2022-03-07 ENCOUNTER — LAB REQUISITION (OUTPATIENT)
Dept: LAB | Facility: HOSPITAL | Age: 62
End: 2022-03-07
Payer: COMMERCIAL

## 2022-03-07 DIAGNOSIS — Z11.59 ENCOUNTER FOR SCREENING FOR OTHER VIRAL DISEASES: ICD-10-CM

## 2022-03-07 DIAGNOSIS — Z03.818 ENCOUNTER FOR OBSERVATION FOR SUSPECTED EXPOSURE TO OTHER BIOLOGICAL AGENTS RULED OUT: ICD-10-CM

## 2022-03-07 LAB — SARS-COV-2 RNA RESP QL NAA+PROBE: POSITIVE

## 2022-03-07 PROCEDURE — U0003 INFECTIOUS AGENT DETECTION BY NUCLEIC ACID (DNA OR RNA); SEVERE ACUTE RESPIRATORY SYNDROME CORONAVIRUS 2 (SARS-COV-2) (CORONAVIRUS DISEASE [COVID-19]), AMPLIFIED PROBE TECHNIQUE, MAKING USE OF HIGH THROUGHPUT TECHNOLOGIES AS DESCRIBED BY CMS-2020-01-R: HCPCS | Performed by: FAMILY MEDICINE

## 2022-03-07 PROCEDURE — U0005 INFEC AGEN DETEC AMPLI PROBE: HCPCS | Performed by: FAMILY MEDICINE

## 2022-04-25 ENCOUNTER — OFFICE VISIT (OUTPATIENT)
Dept: INTERNAL MEDICINE CLINIC | Facility: OTHER | Age: 62
End: 2022-04-25
Payer: COMMERCIAL

## 2022-04-25 VITALS
BODY MASS INDEX: 36.25 KG/M2 | DIASTOLIC BLOOD PRESSURE: 80 MMHG | HEART RATE: 93 BPM | OXYGEN SATURATION: 98 % | TEMPERATURE: 97.8 F | WEIGHT: 197 LBS | SYSTOLIC BLOOD PRESSURE: 160 MMHG | HEIGHT: 62 IN

## 2022-04-25 DIAGNOSIS — Z12.11 COLON CANCER SCREENING: Primary | ICD-10-CM

## 2022-04-25 DIAGNOSIS — F33.1 MODERATE EPISODE OF RECURRENT MAJOR DEPRESSIVE DISORDER (HCC): ICD-10-CM

## 2022-04-25 DIAGNOSIS — Z13.29 SCREENING FOR THYROID DISORDER: ICD-10-CM

## 2022-04-25 DIAGNOSIS — E78.2 MIXED HYPERLIPIDEMIA: ICD-10-CM

## 2022-04-25 DIAGNOSIS — Z13.1 SCREENING FOR DIABETES MELLITUS: ICD-10-CM

## 2022-04-25 DIAGNOSIS — Z13.6 SCREENING FOR CARDIOVASCULAR CONDITION: ICD-10-CM

## 2022-04-25 DIAGNOSIS — F41.9 ANXIETY: ICD-10-CM

## 2022-04-25 DIAGNOSIS — Z12.31 ENCOUNTER FOR SCREENING MAMMOGRAM FOR MALIGNANT NEOPLASM OF BREAST: ICD-10-CM

## 2022-04-25 DIAGNOSIS — Z13.220 LIPID SCREENING: ICD-10-CM

## 2022-04-25 DIAGNOSIS — Z13.228 SCREENING FOR METABOLIC DISORDER: ICD-10-CM

## 2022-04-25 DIAGNOSIS — Z72.0 TOBACCO ABUSE: ICD-10-CM

## 2022-04-25 DIAGNOSIS — Z12.2 SCREENING FOR LUNG CANCER: ICD-10-CM

## 2022-04-25 DIAGNOSIS — I10 ESSENTIAL HYPERTENSION: ICD-10-CM

## 2022-04-25 PROCEDURE — 3079F DIAST BP 80-89 MM HG: CPT | Performed by: STUDENT IN AN ORGANIZED HEALTH CARE EDUCATION/TRAINING PROGRAM

## 2022-04-25 PROCEDURE — 3008F BODY MASS INDEX DOCD: CPT | Performed by: STUDENT IN AN ORGANIZED HEALTH CARE EDUCATION/TRAINING PROGRAM

## 2022-04-25 PROCEDURE — 99214 OFFICE O/P EST MOD 30 MIN: CPT | Performed by: STUDENT IN AN ORGANIZED HEALTH CARE EDUCATION/TRAINING PROGRAM

## 2022-04-25 PROCEDURE — 4004F PT TOBACCO SCREEN RCVD TLK: CPT | Performed by: STUDENT IN AN ORGANIZED HEALTH CARE EDUCATION/TRAINING PROGRAM

## 2022-04-25 PROCEDURE — 3077F SYST BP >= 140 MM HG: CPT | Performed by: STUDENT IN AN ORGANIZED HEALTH CARE EDUCATION/TRAINING PROGRAM

## 2022-04-25 PROCEDURE — 3725F SCREEN DEPRESSION PERFORMED: CPT | Performed by: STUDENT IN AN ORGANIZED HEALTH CARE EDUCATION/TRAINING PROGRAM

## 2022-04-25 RX ORDER — BUPROPION HYDROCHLORIDE 150 MG/1
150 TABLET ORAL EVERY MORNING
Qty: 30 TABLET | Refills: 2 | Status: SHIPPED | OUTPATIENT
Start: 2022-04-25

## 2022-04-25 RX ORDER — IRBESARTAN 300 MG/1
300 TABLET ORAL
Qty: 30 TABLET | Refills: 3 | Status: SHIPPED | OUTPATIENT
Start: 2022-04-25

## 2022-04-25 RX ORDER — ATORVASTATIN CALCIUM 10 MG/1
10 TABLET, FILM COATED ORAL DAILY
Qty: 30 TABLET | Refills: 3 | Status: SHIPPED | OUTPATIENT
Start: 2022-04-25

## 2022-04-25 NOTE — PROGRESS NOTES
INTERNAL MEDICINE NEW PATIENT OFFICE VISIT  [facility]    NAME: Spring [place]  AGE: 65 y o  SEX: female    DATE OF ENCOUNTER: 4/25/2022    Assessment and Plan     1  Mixed hyperlipidemia  Meds refilled per patient request   Will check lipid panel prior to next visit  - atorvastatin (LIPITOR) 10 mg tablet; Take 1 tablet (10 mg total) by mouth daily  Dispense: 30 tablet; Refill: 3  - Lipid Panel with Direct LDL reflex; Future    2  Essential hypertension  Meds refilled per patient request   Will hold off on refilling HCTZ at this time as patient indicates she has been off this medication for at least the last 1 year and her blood pressures were previously well controlled on the 1 drug  Will have patient set up follow-up in 2 weeks for blood pressure recheck and lab review  - irbesartan (AVAPRO) 300 mg tablet; Take 1 tablet (300 mg total) by mouth daily at bedtime  Dispense: 30 tablet; Refill: 3  - CBC and differential; Future    3  Anxiety  After extensive discussion with patient, will continue with Zoloft at current dose of 50 mg daily  In addition, we will refer her to behavioral health and ask our office staff to provide her with a list of local therapists if available  Additionally given her concomitant history of cigarette smoking, we will initiate bupropion 150 mg daily to assist with her symptom withdrawal as well as to help her quit smoking  Will follow-up with her mental health symptoms and her cigarette smoking at her next visit  Recheck PHQ at next visit  - sertraline (ZOLOFT) 50 mg tablet; Take 1 tablet (50 mg total) by mouth daily  Dispense: 30 tablet; Refill: 3  - Ambulatory Referral to [facility]; Future  - buPROPion (Wellbutrin XL) 150 mg 24 hr tablet; Take 1 tablet (150 mg total) by mouth every morning  Dispense: 30 tablet; Refill: 2    4  Moderate episode of recurrent major depressive disorder ([facility] [address] )  See plan under 3  Above  - sertraline (ZOLOFT) 50 mg tablet;  Take 1 tablet (50 mg total) by mouth daily  Dispense: 30 tablet; Refill: 3  - [place]; Future  - buPROPion (Wellbutrin XL) 150 mg 24 hr tablet; Take 1 tablet (150 mg total) by mouth every morning  Dispense: 30 tablet; Refill: 2    5  Colon cancer screening  Patient reports she has never received CRC screening  After extensive discussion, she agreed to be referred for colonoscopy with evaluation by GI  Referral placed  - Ambulatory referral for colonoscopy; Future    6  Encounter for screening mammogram for malignant neoplasm of breast  Patient reports she has never been screened for breast cancer despite her previous PCP ordering this for her  This was again ordered and she was strongly encouraged to schedule this at her earliest convenience issues will overdue  - Mammo screening bilateral w 3d & cad; Future    7  Tobacco abuse  Patient with significant history of smoking since the age of 14, at least 30-35 pack years  After extensive discussion between herself, my attending, and me, the patient agrees to lung cancer screening with low-dose chest CT  Orders were placed for same  As indicated above, will also start Wellbutrin for concomitant treatment of anxiety, depression, and cigarette smoking   - CT lung screening program; Future  - buPROPion (Wellbutrin XL) 150 mg 24 hr tablet; Take 1 tablet (150 mg total) by mouth every morning  Dispense: 30 tablet; Refill: 2    8  Screening for lung cancer  See plan under 7  Above  Patient participated in shared decision making agrees to report for lung cancer screening with low-dose chest CT   - CT lung screening program; Future    9  Screening for diabetes mellitus  Check labs prior to next visit   - HEMOGLOBIN A1C W/ EAG ESTIMATION; Future    10  Lipid screening  See plan under 1  Above  11  Screening for cardiovascular condition  Check labs prior to next visit  - CBC and differential; Future  - Comprehensive metabolic panel; Future    12  Screening for thyroid disorder  Check labs prior to next visit  - TSH + Free T4; Future    13  Screening for metabolic disorder  Check labs prior to next visit  - Comprehensive metabolic panel; Future    No orders of the defined types were placed in this encounter       - Counseling Documentation: patient was counseled regarding: diagnostic results, instructions for management, risk factor reductions, prognosis, patient and family education, impressions, risks and benefits of treatment options and importance of compliance with treatment  - Counseling Time: counseling time more than 50% of visit: 45 minutes  - Medication Side Effects: Adverse side effects of medications were reviewed with the patient/guardian today  Routine Health Maintenance:   Cancer screening:   Colorectal:  Overdue   Lung:  Overdue   Cervical:  Discuss at next visit   Breast:  Overdue  Labs:   Hemoglobin A1c:  Check prior to next visit   Lipid panel:  Check prior to next visit  Other:   PHQ depression screening:  PHQ score 5/9 indicating mild active depression  PHQ-2/9 Depression Screening    Little interest or pleasure in doing things: 1 - several days  Feeling down, depressed, or hopeless: 0 - not at all  Trouble falling or staying asleep, or sleeping too much: 0 - not at all  Feeling tired or having little energy: 1 - several days  Poor appetite or overeatin - several days  Feeling bad about yourself - or that you are a failure or have let yourself or your family down: 1 - several days  Trouble concentrating on things, such as reading the newspaper or watching television: 1 - several days  Moving or speaking so slowly that other people could have noticed   Or the opposite - being so fidgety or restless that you have been moving around a lot more than usual: 0 - not at all  Thoughts that you would be better off dead, or of hurting yourself in some way: 0 - not at all  PHQ-9 Score: 5   PHQ-9 Interpretation: Mild depression  BMI Counseling: Body mass index is 36 03 kg/m²  The BMI is above normal  Nutrition recommendations include reducing portion sizes, decreasing overall calorie intake, 3-5 servings of fruits/vegetables daily, reducing fast food intake, consuming healthier snacks, decreasing soda and/or juice intake, moderation in carbohydrate intake, increasing intake of lean protein, reducing intake of saturated fat and trans fat and reducing intake of cholesterol  Exercise recommendations include moderate aerobic physical activity for 150 minutes/week, exercising 3-5 times per week and strength training exercises   Tobacco: Tobacco Cessation Counseling: Tobacco cessation counseling and education was provided  The patient is sincerely urged to quit consumption of tobacco  She is ready to quit tobacco  The numerous health risks of tobacco consumption were discussed  Prescribed the following medications: bupropion  Advised diet and exercise  Advised to refrain from tobacco, alcohol, and illicit drug use  Advised medical compliance      OMT/OPP: During the course of my history and physical, I utilized osteopathic examination modalities to assess the patient  Somatic dysfunctions were not identified during this visit  OMT is not indicated at this time  Chief Complaint     Chief Complaint   Patient presents with    Hypertension     bp elevated hasnt taken meds out of them- wants new pcp         BMI, BMI ADULT , COLONOSCOPY, MAMMO, ANNUAL PHYSICAL , DEPRESSION SC,        History of Present Illness     Tiffany George is a 65 y o  female with past medical history significant for hypertension, hyperlipidemia, allergies, tobacco abuse, obesity, depression, anxiety, arthritis, dermatitis, and COVID-19 infection in March of this year who presents as a new patient to our practice and for medication refill  She endorses that she has been somewhat put off by her former PCPs office and would like a new one    She works at the [employer] nearby to this office  Her chief complaint today is being out of her blood pressure medications  She says that she was last given a 3 month supply by her previous PCPs office and has been without her medications for the last 3 days  She endorses compliance with her ARB but states she has not been taking her hydrochlorothiazide for at least the last 1 year  She states she can tell when her blood pressure is up because she begins to feel dizzy and lightheaded offer for medications which she has been for the last 36 hours at least     Regarding her medications, she says that she is intermittently compliant with her Zoloft, missing roughly 4 doses per month on average which corresponds with the days that she does not report for work as her routine revolves around her job  She states he was initially prescribed for anxiety but also has been prescribed for her depression  She states that her mood is low and her motivation is poor and that she worries that she may be developing worsening depression in recent months  She states she has been on Zoloft 50 mg daily since the least before COVID began  She is willing to talk to [facility], which she has not on before  She is also willing to either increase her medication to discuss another option if it might be of benefit for her  Regarding her social history, she says that she smokes cigarettes and has been doing so since around the age of 14  She states that 1 carton will last her around 6 weeks but she used to smoke enough such that a carton of cigarettes would last her around week  She denies any drug use or alcohol use  Besides feeling dizzy and lightheaded occasionally, she states that otherwise she feels well and has no acute complaints at time of my exam     Regarding her routine health maintenance items, she tells me she has never been screened for colon cancer despite multiple discussions with her previous PCP    She indicates she has never received a mammogram screening  She also indicates she has never received lung cancer screening  The following portions of the patient's history were reviewed and updated as appropriate: allergies, current medications, past family history, past medical history, past social history, past surgical history and problem list     Review of Systems     Review of Systems   Constitutional: Negative for chills, fatigue and fever  HENT: Negative for congestion, postnasal drip, rhinorrhea, sinus pressure, sinus pain and sore throat  Respiratory: Negative for cough, shortness of breath and wheezing  Cardiovascular: Negative for chest pain and palpitations  Gastrointestinal: Negative for abdominal pain, constipation, diarrhea, nausea and vomiting  Genitourinary: Negative for difficulty urinating, dysuria, frequency, hematuria and urgency  Musculoskeletal: Negative for arthralgias, back pain, gait problem and myalgias  Skin: Negative for pallor, rash and wound  Neurological: Positive for dizziness and light-headedness  Negative for weakness, numbness and headaches  Psychiatric/Behavioral: Positive for dysphoric mood  Negative for suicidal ideas  The patient is nervous/anxious  Active Problem List     Patient Active Problem List   Diagnosis    Essential hypertension    Chronic right shoulder pain    Encounter for hepatitis C virus screening test for high risk patient    Colon cancer screening    Lipid screening    Gait abnormality    Chronic cough    Cigarette nicotine dependence without complication    Breast cancer screening    Obesity (BMI 30 0-34  9)    Seasonal allergic rhinitis due to pollen    Bilateral leg edema    Pneumonia of both lungs due to infectious organism    Cellulitis    Mixed hyperlipidemia    Hypokalemia    Pain and swelling of left wrist    Pain of left middle finger    Major depressive disorder    Anxiety    Right wrist tendinitis  BMI 32 0-32 9,adult    Bilateral foot pain    Dermatitis       Objective     /80 (BP Location: Left arm, Patient Position: Sitting, Cuff Size: Adult)   Pulse 93   Temp 97 8 °F (36 6 °C) (Temporal)   Ht 5' 2" (1 575 m)   Wt 89 4 kg (197 lb)   SpO2 98%   BMI 36 03 kg/m²     Physical Exam  Vitals and nursing note reviewed  Constitutional:       General: She is not in acute distress  Appearance: Normal appearance  She is obese  She is not ill-appearing, toxic-appearing or diaphoretic  Comments: Patient is pleasant, calm, cooperative  She is seated comfortably on a chair in no acute distress  HENT:      Head: Normocephalic and atraumatic  Eyes:      General: No scleral icterus  Extraocular Movements: Extraocular movements intact  Conjunctiva/sclera: Conjunctivae normal       Pupils: Pupils are equal, round, and reactive to light  Cardiovascular:      Rate and Rhythm: Normal rate and regular rhythm  Pulses: Normal pulses  Heart sounds: Normal heart sounds  No murmur heard  No friction rub  No gallop  Pulmonary:      Effort: Pulmonary effort is normal  No respiratory distress  Breath sounds: Normal breath sounds  No stridor  No wheezing or rhonchi  Abdominal:      General: Bowel sounds are normal  There is no distension  Palpations: Abdomen is soft  There is no mass  Tenderness: There is no abdominal tenderness  There is no guarding or rebound  Hernia: No hernia is present  Comments: Centrally obese  Musculoskeletal:         General: No swelling, tenderness or deformity  Cervical back: Neck supple  Lymphadenopathy:      Cervical: No cervical adenopathy  Skin:     General: Skin is warm and dry  Capillary Refill: Capillary refill takes less than 2 seconds  Coloration: Skin is not pale  Findings: No erythema or rash  Neurological:      General: No focal deficit present  Mental Status: She is alert  Psychiatric:         Mood and Affect: Mood normal          Behavior: Behavior normal          Thought Content: Thought content normal          Judgment: Judgment normal          Pertinent Laboratory/Diagnostic Studies:  XR foot 3+ vw left    Result Date: 9/6/2021  Impression: No acute osseous abnormality  Degenerative changes as described  Workstation performed: [id number]     XR foot 3+ vw right    Result Date: 9/6/2021  Impression: No acute osseous abnormality  Degenerative changes as described   Workstation performed: [id number]       Images and diagnostics reviewed     Current Medications     Current Outpatient Medications:     acetaminophen (TYLENOL) 650 mg CR tablet, Take 1 tablet (650 mg total) by mouth every 8 (eight) hours as needed for mild pain, Disp: 30 tablet, Rfl: 3    albuterol (Ventolin HFA) 90 mcg/act inhaler, Inhale 2 puffs every 6 (six) hours as needed for wheezing, Disp: 18 g, Rfl: 1    atorvastatin (LIPITOR) 10 mg tablet, Take 1 tablet (10 mg total) by mouth daily (Patient not taking: Reported on 4/25/2022 ), Disp: 30 tablet, Rfl: 3    hydrochlorothiazide (HYDRODIURIL) 25 mg tablet, Take 1 tablet (25 mg total) by mouth daily (Patient not taking: Reported on 4/25/2022 ), Disp: 30 tablet, Rfl: 3    irbesartan (AVAPRO) 300 mg tablet, Take 1 tablet (300 mg total) by mouth daily at bedtime (Patient not taking: Reported on 4/25/2022 ), Disp: 30 tablet, Rfl: 3    sertraline (ZOLOFT) 50 mg tablet, Take 1 tablet (50 mg total) by mouth daily (Patient not taking: Reported on 4/25/2022 ), Disp: 30 tablet, Rfl: 3    triamcinolone (KENALOG) 0 1 % lotion, Apply topically 3 (three) times a day as needed for irritation (Patient not taking: Reported on 4/25/2022 ), Disp: 60 mL, Rfl: 0    Health Maintenance     Health Maintenance   Topic Date Due    COVID-19 Vaccine (1) Never done    Pneumococcal Vaccine: Pediatrics (0 to 5 Years) and At-Risk Patients (6 to 59 Years) (1 of 2 - PPSV23) Never done    HIV Screening  Never done    Annual Physical  Never done    DTaP,Tdap,and Td Vaccines (1 - Tdap) Never done    Cervical Cancer Screening  Never done    Breast Cancer Screening: Mammogram  Never done    Colorectal Cancer Screening  Never done    Influenza Vaccine (1) Never done    BMI: Followup Plan  08/30/2022    BMI: Adult  04/25/2023    Depression Remission PHQ  04/25/2023    Hepatitis C Screening  Completed    HIB Vaccine  Aged Out    Hepatitis B Vaccine  Aged Out    IPV Vaccine  Aged Out    Hepatitis A Vaccine  Aged Out    Meningococcal ACWY Vaccine  Aged Out    HPV Vaccine  Aged Out     There is no immunization history for the selected administration types on file for this patient  Portions of this note may have been created with voice recognition software  Occasional wrong word or sound a like substitutions may have occurred due to inherent limitations of voice recognition software  Read the chart carefully and recognize, using context, where substitutions have occurred  Global time spent on encounter: 45 minutes    MARTIN Dominique  Internal Medicine PGY-3  [address] [facility] , [address] [address] [facility] [address] [facility]  Office: [phone]  Fax: [phone]

## 2022-05-13 PROCEDURE — 87635 SARS-COV-2 COVID-19 AMP PRB: CPT | Performed by: FAMILY MEDICINE

## 2022-05-15 ENCOUNTER — LAB REQUISITION (OUTPATIENT)
Dept: LAB | Facility: HOSPITAL | Age: 62
End: 2022-05-15
Payer: COMMERCIAL

## 2022-05-15 DIAGNOSIS — Z03.818 ENCOUNTER FOR OBSERVATION FOR SUSPECTED EXPOSURE TO OTHER BIOLOGICAL AGENTS RULED OUT: ICD-10-CM

## 2022-05-15 DIAGNOSIS — Z11.59 ENCOUNTER FOR SCREENING FOR OTHER VIRAL DISEASES: ICD-10-CM

## 2022-05-15 LAB — SARS-COV-2 RNA RESP QL NAA+PROBE: NEGATIVE

## 2022-05-20 PROCEDURE — U0003 INFECTIOUS AGENT DETECTION BY NUCLEIC ACID (DNA OR RNA); SEVERE ACUTE RESPIRATORY SYNDROME CORONAVIRUS 2 (SARS-COV-2) (CORONAVIRUS DISEASE [COVID-19]), AMPLIFIED PROBE TECHNIQUE, MAKING USE OF HIGH THROUGHPUT TECHNOLOGIES AS DESCRIBED BY CMS-2020-01-R: HCPCS | Performed by: FAMILY MEDICINE

## 2022-05-20 PROCEDURE — U0005 INFEC AGEN DETEC AMPLI PROBE: HCPCS | Performed by: FAMILY MEDICINE

## 2022-05-21 ENCOUNTER — LAB REQUISITION (OUTPATIENT)
Dept: LAB | Facility: HOSPITAL | Age: 62
End: 2022-05-21
Payer: COMMERCIAL

## 2022-05-21 DIAGNOSIS — Z11.59 ENCOUNTER FOR SCREENING FOR OTHER VIRAL DISEASES: ICD-10-CM

## 2022-05-22 LAB — SARS-COV-2 RNA RESP QL NAA+PROBE: NEGATIVE

## 2022-05-30 PROCEDURE — U0005 INFEC AGEN DETEC AMPLI PROBE: HCPCS | Performed by: FAMILY MEDICINE

## 2022-05-30 PROCEDURE — U0003 INFECTIOUS AGENT DETECTION BY NUCLEIC ACID (DNA OR RNA); SEVERE ACUTE RESPIRATORY SYNDROME CORONAVIRUS 2 (SARS-COV-2) (CORONAVIRUS DISEASE [COVID-19]), AMPLIFIED PROBE TECHNIQUE, MAKING USE OF HIGH THROUGHPUT TECHNOLOGIES AS DESCRIBED BY CMS-2020-01-R: HCPCS | Performed by: FAMILY MEDICINE

## 2022-05-31 ENCOUNTER — LAB REQUISITION (OUTPATIENT)
Dept: LAB | Facility: HOSPITAL | Age: 62
End: 2022-05-31
Payer: COMMERCIAL

## 2022-05-31 DIAGNOSIS — Z03.818 ENCOUNTER FOR OBSERVATION FOR SUSPECTED EXPOSURE TO OTHER BIOLOGICAL AGENTS RULED OUT: ICD-10-CM

## 2022-05-31 DIAGNOSIS — Z11.59 ENCOUNTER FOR SCREENING FOR OTHER VIRAL DISEASES: ICD-10-CM

## 2022-06-01 LAB — SARS-COV-2 RNA RESP QL NAA+PROBE: NEGATIVE

## 2022-07-06 PROCEDURE — U0005 INFEC AGEN DETEC AMPLI PROBE: HCPCS | Performed by: FAMILY MEDICINE

## 2022-07-06 PROCEDURE — U0003 INFECTIOUS AGENT DETECTION BY NUCLEIC ACID (DNA OR RNA); SEVERE ACUTE RESPIRATORY SYNDROME CORONAVIRUS 2 (SARS-COV-2) (CORONAVIRUS DISEASE [COVID-19]), AMPLIFIED PROBE TECHNIQUE, MAKING USE OF HIGH THROUGHPUT TECHNOLOGIES AS DESCRIBED BY CMS-2020-01-R: HCPCS | Performed by: FAMILY MEDICINE

## 2022-07-07 ENCOUNTER — LAB REQUISITION (OUTPATIENT)
Dept: LAB | Facility: HOSPITAL | Age: 62
End: 2022-07-07
Payer: COMMERCIAL

## 2022-07-07 DIAGNOSIS — Z11.59 ENCOUNTER FOR SCREENING FOR OTHER VIRAL DISEASES: ICD-10-CM

## 2022-07-07 DIAGNOSIS — Z03.818 ENCOUNTER FOR OBSERVATION FOR SUSPECTED EXPOSURE TO OTHER BIOLOGICAL AGENTS RULED OUT: ICD-10-CM

## 2022-07-07 LAB — SARS-COV-2 RNA RESP QL NAA+PROBE: NEGATIVE

## 2022-07-11 ENCOUNTER — LAB REQUISITION (OUTPATIENT)
Dept: LAB | Facility: HOSPITAL | Age: 62
End: 2022-07-11
Payer: COMMERCIAL

## 2022-07-11 DIAGNOSIS — Z03.818 ENCOUNTER FOR OBSERVATION FOR SUSPECTED EXPOSURE TO OTHER BIOLOGICAL AGENTS RULED OUT: ICD-10-CM

## 2022-07-11 DIAGNOSIS — Z11.59 ENCOUNTER FOR SCREENING FOR OTHER VIRAL DISEASES: ICD-10-CM

## 2022-07-11 PROCEDURE — U0003 INFECTIOUS AGENT DETECTION BY NUCLEIC ACID (DNA OR RNA); SEVERE ACUTE RESPIRATORY SYNDROME CORONAVIRUS 2 (SARS-COV-2) (CORONAVIRUS DISEASE [COVID-19]), AMPLIFIED PROBE TECHNIQUE, MAKING USE OF HIGH THROUGHPUT TECHNOLOGIES AS DESCRIBED BY CMS-2020-01-R: HCPCS | Performed by: FAMILY MEDICINE

## 2022-07-11 PROCEDURE — U0005 INFEC AGEN DETEC AMPLI PROBE: HCPCS | Performed by: FAMILY MEDICINE

## 2022-07-12 LAB — SARS-COV-2 RNA RESP QL NAA+PROBE: NEGATIVE

## 2022-07-18 ENCOUNTER — LAB REQUISITION (OUTPATIENT)
Dept: LAB | Facility: HOSPITAL | Age: 62
End: 2022-07-18
Payer: COMMERCIAL

## 2022-07-18 DIAGNOSIS — Z03.818 ENCOUNTER FOR OBSERVATION FOR SUSPECTED EXPOSURE TO OTHER BIOLOGICAL AGENTS RULED OUT: ICD-10-CM

## 2022-07-18 DIAGNOSIS — Z11.59 ENCOUNTER FOR SCREENING FOR OTHER VIRAL DISEASES: ICD-10-CM

## 2022-07-18 PROCEDURE — U0003 INFECTIOUS AGENT DETECTION BY NUCLEIC ACID (DNA OR RNA); SEVERE ACUTE RESPIRATORY SYNDROME CORONAVIRUS 2 (SARS-COV-2) (CORONAVIRUS DISEASE [COVID-19]), AMPLIFIED PROBE TECHNIQUE, MAKING USE OF HIGH THROUGHPUT TECHNOLOGIES AS DESCRIBED BY CMS-2020-01-R: HCPCS | Performed by: FAMILY MEDICINE

## 2022-07-18 PROCEDURE — U0005 INFEC AGEN DETEC AMPLI PROBE: HCPCS | Performed by: FAMILY MEDICINE

## 2022-07-19 LAB — SARS-COV-2 RNA RESP QL NAA+PROBE: NEGATIVE

## 2022-07-20 ENCOUNTER — TELEPHONE (OUTPATIENT)
Dept: FAMILY MEDICINE CLINIC | Facility: CLINIC | Age: 62
End: 2022-07-20

## 2022-07-25 ENCOUNTER — LAB REQUISITION (OUTPATIENT)
Dept: LAB | Facility: HOSPITAL | Age: 62
End: 2022-07-25
Payer: COMMERCIAL

## 2022-07-25 DIAGNOSIS — Z11.52 ENCOUNTER FOR SCREENING FOR COVID-19: ICD-10-CM

## 2022-07-25 PROCEDURE — U0003 INFECTIOUS AGENT DETECTION BY NUCLEIC ACID (DNA OR RNA); SEVERE ACUTE RESPIRATORY SYNDROME CORONAVIRUS 2 (SARS-COV-2) (CORONAVIRUS DISEASE [COVID-19]), AMPLIFIED PROBE TECHNIQUE, MAKING USE OF HIGH THROUGHPUT TECHNOLOGIES AS DESCRIBED BY CMS-2020-01-R: HCPCS | Performed by: FAMILY MEDICINE

## 2022-07-25 PROCEDURE — U0005 INFEC AGEN DETEC AMPLI PROBE: HCPCS | Performed by: FAMILY MEDICINE

## 2022-07-26 LAB — SARS-COV-2 RNA RESP QL NAA+PROBE: NEGATIVE

## 2022-07-28 ENCOUNTER — VBI (OUTPATIENT)
Dept: ADMINISTRATIVE | Facility: OTHER | Age: 62
End: 2022-07-28

## 2022-07-28 NOTE — TELEPHONE ENCOUNTER
07/27/22 10:08 PM     Thank you for your request  Dorie Shay PCP office messaged to update PCP field  This message will now be completed      Thank you  Bhavin Pineda

## 2022-08-24 DIAGNOSIS — I10 ESSENTIAL HYPERTENSION: ICD-10-CM

## 2022-08-24 DIAGNOSIS — Z72.0 TOBACCO ABUSE: ICD-10-CM

## 2022-08-24 DIAGNOSIS — F41.9 ANXIETY: ICD-10-CM

## 2022-08-24 DIAGNOSIS — E78.2 MIXED HYPERLIPIDEMIA: ICD-10-CM

## 2022-08-24 DIAGNOSIS — F33.1 MODERATE EPISODE OF RECURRENT MAJOR DEPRESSIVE DISORDER (HCC): ICD-10-CM

## 2022-08-24 RX ORDER — BUPROPION HYDROCHLORIDE 150 MG/1
150 TABLET ORAL EVERY MORNING
Qty: 30 TABLET | Refills: 2 | Status: SHIPPED | OUTPATIENT
Start: 2022-08-24

## 2022-08-24 RX ORDER — IRBESARTAN 300 MG/1
300 TABLET ORAL
Qty: 30 TABLET | Refills: 3 | Status: SHIPPED | OUTPATIENT
Start: 2022-08-24

## 2022-08-24 RX ORDER — ATORVASTATIN CALCIUM 10 MG/1
10 TABLET, FILM COATED ORAL DAILY
Qty: 30 TABLET | Refills: 3 | Status: SHIPPED | OUTPATIENT
Start: 2022-08-24

## 2022-10-04 ENCOUNTER — VBI (OUTPATIENT)
Dept: ADMINISTRATIVE | Facility: OTHER | Age: 62
End: 2022-10-04

## 2022-10-07 ENCOUNTER — VBI (OUTPATIENT)
Dept: ADMINISTRATIVE | Facility: OTHER | Age: 62
End: 2022-10-07

## 2022-11-11 ENCOUNTER — VBI (OUTPATIENT)
Dept: ADMINISTRATIVE | Facility: OTHER | Age: 62
End: 2022-11-11

## 2022-11-18 ENCOUNTER — VBI (OUTPATIENT)
Dept: ADMINISTRATIVE | Facility: OTHER | Age: 62
End: 2022-11-18

## 2022-12-13 ENCOUNTER — VBI (OUTPATIENT)
Dept: ADMINISTRATIVE | Facility: OTHER | Age: 62
End: 2022-12-13

## 2022-12-23 ENCOUNTER — TELEMEDICINE (OUTPATIENT)
Dept: INTERNAL MEDICINE CLINIC | Facility: OTHER | Age: 62
End: 2022-12-23

## 2022-12-23 DIAGNOSIS — E78.2 MIXED HYPERLIPIDEMIA: ICD-10-CM

## 2022-12-23 DIAGNOSIS — I10 ESSENTIAL HYPERTENSION: ICD-10-CM

## 2022-12-23 DIAGNOSIS — F17.210 CIGARETTE NICOTINE DEPENDENCE WITHOUT COMPLICATION: ICD-10-CM

## 2022-12-23 DIAGNOSIS — Z28.21 INFLUENZA VACCINE REFUSED: ICD-10-CM

## 2022-12-23 DIAGNOSIS — F33.1 MODERATE EPISODE OF RECURRENT MAJOR DEPRESSIVE DISORDER (HCC): ICD-10-CM

## 2022-12-23 DIAGNOSIS — Z72.0 TOBACCO ABUSE: ICD-10-CM

## 2022-12-23 DIAGNOSIS — F41.9 ANXIETY: ICD-10-CM

## 2022-12-23 PROBLEM — M79.672 BILATERAL FOOT PAIN: Status: RESOLVED | Noted: 2021-02-16 | Resolved: 2022-12-23

## 2022-12-23 PROBLEM — M77.8 RIGHT WRIST TENDINITIS: Status: RESOLVED | Noted: 2020-10-28 | Resolved: 2022-12-23

## 2022-12-23 PROBLEM — Z13.220 LIPID SCREENING: Status: RESOLVED | Noted: 2019-03-05 | Resolved: 2022-12-23

## 2022-12-23 PROBLEM — J18.9 PNEUMONIA OF BOTH LUNGS DUE TO INFECTIOUS ORGANISM: Status: RESOLVED | Noted: 2019-08-19 | Resolved: 2022-12-23

## 2022-12-23 PROBLEM — M25.432 PAIN AND SWELLING OF LEFT WRIST: Status: RESOLVED | Noted: 2020-09-30 | Resolved: 2022-12-23

## 2022-12-23 PROBLEM — L30.9 DERMATITIS: Chronic | Status: RESOLVED | Noted: 2021-08-30 | Resolved: 2022-12-23

## 2022-12-23 PROBLEM — R05.3 CHRONIC COUGH: Status: RESOLVED | Noted: 2019-03-21 | Resolved: 2022-12-23

## 2022-12-23 PROBLEM — M79.645 PAIN OF LEFT MIDDLE FINGER: Status: RESOLVED | Noted: 2020-09-30 | Resolved: 2022-12-23

## 2022-12-23 PROBLEM — M25.532 PAIN AND SWELLING OF LEFT WRIST: Status: RESOLVED | Noted: 2020-09-30 | Resolved: 2022-12-23

## 2022-12-23 PROBLEM — R60.0 BILATERAL LEG EDEMA: Status: RESOLVED | Noted: 2019-08-05 | Resolved: 2022-12-23

## 2022-12-23 PROBLEM — L03.90 CELLULITIS: Status: RESOLVED | Noted: 2019-09-13 | Resolved: 2022-12-23

## 2022-12-23 PROBLEM — M79.671 BILATERAL FOOT PAIN: Status: RESOLVED | Noted: 2021-02-16 | Resolved: 2022-12-23

## 2022-12-23 PROBLEM — E66.811 OBESITY (BMI 30.0-34.9): Status: RESOLVED | Noted: 2019-06-27 | Resolved: 2022-12-23

## 2022-12-23 PROBLEM — E87.6 HYPOKALEMIA: Status: RESOLVED | Noted: 2020-04-01 | Resolved: 2022-12-23

## 2022-12-23 PROBLEM — E66.9 OBESITY (BMI 30.0-34.9): Status: RESOLVED | Noted: 2019-06-27 | Resolved: 2022-12-23

## 2022-12-23 RX ORDER — BUPROPION HYDROCHLORIDE 150 MG/1
150 TABLET ORAL EVERY MORNING
Qty: 30 TABLET | Refills: 0 | Status: SHIPPED | OUTPATIENT
Start: 2022-12-23

## 2022-12-23 RX ORDER — ATORVASTATIN CALCIUM 10 MG/1
10 TABLET, FILM COATED ORAL DAILY
Qty: 30 TABLET | Refills: 0 | Status: SHIPPED | OUTPATIENT
Start: 2022-12-23

## 2022-12-23 RX ORDER — IRBESARTAN 300 MG/1
300 TABLET ORAL
Qty: 30 TABLET | Refills: 0 | Status: SHIPPED | OUTPATIENT
Start: 2022-12-23

## 2022-12-23 NOTE — PROGRESS NOTES
Virtual Regular Visit    Verification of patient location:    Patient is located in the following state in which I hold an active license [place]      Assessment/Plan:    Problem List Items Addressed This Visit        Cardiovascular and Mediastinum    Essential hypertension    Relevant Medications    irbesartan (AVAPRO) 300 mg tablet       Other    Cigarette nicotine dependence without complication    Mixed hyperlipidemia    Relevant Medications    atorvastatin (LIPITOR) 10 mg tablet    Major depressive disorder    Relevant Medications    sertraline (ZOLOFT) 50 mg tablet    buPROPion (Wellbutrin XL) 150 mg 24 hr tablet    Anxiety    Relevant Medications    sertraline (ZOLOFT) 50 mg tablet    buPROPion (Wellbutrin XL) 150 mg 24 hr tablet    Tobacco abuse    Relevant Medications    buPROPion (Wellbutrin XL) 150 mg 24 hr tablet    Influenza vaccine refused    BMI 36 0-36 9,adult - Primary       BMI Counseling: There is no height or weight on file to calculate BMI  The BMI is above normal  Nutrition recommendations include moderation in carbohydrate intake  Exercise recommendations include exercising 3-5 times per week  No pharmacotherapy was ordered  Rationale for BMI follow-up plan is due to patient being overweight or obese  Medications refilled for 1 month  No further refills from our office until patient gets blood work done  She is made aware that she must have blood work done when ordered by physicians in our office and must stay compliant  I advised her to check blood pressure at home or at work and she is agreeable to this plan  She is aware of mammogram and CT scan of the chest that are already ordered in her chart that she has not done      Reason for visit is   Chief Complaint   Patient presents with   • Follow-up     8 month, labs not done from April   •      Annual exam, mammo ( has not scheduled said she would do labs and mammo together I asked if she wanted help to schedule she declined at this time ) cervical screen, HIV, bmi f/u plan, cologuard   • Dizziness     Has been going on for a long time         Encounter provider Marichuy Palmer DO    Provider located at [address] [place] [address]  [address] [facility] [id number]      Recent Visits  No visits were found meeting these conditions  Showing recent visits within past 7 days and meeting all other requirements  Today's Visits  Date Type Provider Dept   12/23/22 [address], DO [facility]   Showing today's visits and meeting all other requirements  Future Appointments  No visits were found meeting these conditions  Showing future appointments within next 150 days and meeting all other requirements       The patient was identified by name and date of birth  Lawson Or was informed that this is a telemedicine visit and that the visit is being conducted through the [address] [employer] platform  She agrees to proceed     My office door was closed  No one else was in the room  She acknowledged consent and understanding of privacy and security of the video platform  The patient has agreed to participate and understands they can discontinue the visit at any time  Patient is aware this is a billable service  Subjective  Lawson Or is a 65 y o  female    HPI       Essential hypertension, patient on Avapro, was prescribed hydrochlorothiazide which she is not taking  Has not checked her blood pressure at work but states its been better than before  No labs and has been unsuccessful at weight loss  Mixed hyperlipidemia, on Lipitor, compliant with medications  Needs refills  Has not had lab work done in over 1 year  When    Major depressive disorder, on Zoloft at 50 mg daily  States that this is working well for her  She admits to being a procrastinator  No HI or SI or anxiety flareups  Needs a refill      Tobacco abuse, unwilling to quit  BMI 36  Past Medical History:   Diagnosis Date   • Bilateral foot pain 2/16/2021   • Bilateral leg edema 8/5/2019    Side effect from amlodipine   • Cellulitis 9/13/2019   • Chronic cough 3/21/2019   • Dermatitis 8/30/2021   • Hypertension    • Hypokalemia 4/1/2020   • Lipid screening 3/5/2019   • Pain of left middle finger 9/30/2020   • Pneumonia of both lungs due to infectious organism 8/19/2019   • Right wrist tendinitis 10/28/2020       Past Surgical History:   Procedure Laterality Date   • APPENDECTOMY      APPENDISITIS SURGERY AGE 19       Current Outpatient Medications   Medication Sig Dispense Refill   • acetaminophen (TYLENOL) 650 mg CR tablet Take 1 tablet (650 mg total) by mouth every 8 (eight) hours as needed for mild pain 30 tablet 3   • albuterol (Ventolin HFA) 90 mcg/act inhaler Inhale 2 puffs every 6 (six) hours as needed for wheezing 18 g 1   • atorvastatin (LIPITOR) 10 mg tablet Take 1 tablet (10 mg total) by mouth daily 30 tablet 0   • buPROPion (Wellbutrin XL) 150 mg 24 hr tablet Take 1 tablet (150 mg total) by mouth every morning 30 tablet 0   • irbesartan (AVAPRO) 300 mg tablet Take 1 tablet (300 mg total) by mouth daily at bedtime 30 tablet 0   • sertraline (ZOLOFT) 50 mg tablet Take 1 tablet (50 mg total) by mouth daily 30 tablet 0   • hydrochlorothiazide (HYDRODIURIL) 25 mg tablet Take 1 tablet (25 mg total) by mouth daily (Patient not taking: Reported on 4/25/2022) 30 tablet 3   • triamcinolone (KENALOG) 0 1 % lotion Apply topically 3 (three) times a day as needed for irritation (Patient not taking: Reported on 4/25/2022) 60 mL 0     No current facility-administered medications for this visit          Allergies   Allergen Reactions   • Amlodipine Edema   • Lisinopril Cough   • Pollen Extract    • Latex Rash       Review of Systems     Constitutional:  Denies fever or chills   Eyes:  Denies double , blurry vision or eye pain  HENT:  Denies nasal congestion or sore throat   Respiratory:  Denies cough or shortness of breath or wheezing  Cardiovascular:  Denies palpitations or chest pain  GI:  Denies abdominal pain, nausea, or vomiting, no loose stools, no reflux  Integument:  Denies rash , no open areas  Neurologic:  Denies headache or focal weakness, no dizziness  : no dysuria, or hematuria      Video Exam    There were no vitals filed for this visit      Physical Exam     Constitutional:  Well developed, well nourished, no acute distress, non-toxic appearance   Eyes:  Is conjunctiva normal , non icteric sclera  HENT:  Atraumatic, non congested  Respiratory:  Nonlabored, appears comfortable, no conversational dyspnea  Cardiovascular:   no LE edema b/l  Neurologic:  no focal deficits noted   Psychiatric:  Speech and behavior appropriate , AAO x 3      I spent 3 min 57 sec minutes directly with the patient during this visit

## 2022-12-27 ENCOUNTER — TELEPHONE (OUTPATIENT)
Dept: INTERNAL MEDICINE CLINIC | Facility: OTHER | Age: 62
End: 2022-12-27

## 2023-01-03 ENCOUNTER — APPOINTMENT (OUTPATIENT)
Dept: LAB | Facility: IMAGING CENTER | Age: 63
End: 2023-01-03

## 2023-01-03 DIAGNOSIS — Z13.29 SCREENING FOR THYROID DISORDER: ICD-10-CM

## 2023-01-03 DIAGNOSIS — I10 ESSENTIAL HYPERTENSION: ICD-10-CM

## 2023-01-03 DIAGNOSIS — Z13.6 SCREENING FOR CARDIOVASCULAR CONDITION: ICD-10-CM

## 2023-01-03 DIAGNOSIS — Z13.1 SCREENING FOR DIABETES MELLITUS: ICD-10-CM

## 2023-01-03 DIAGNOSIS — Z13.228 SCREENING FOR METABOLIC DISORDER: ICD-10-CM

## 2023-01-03 DIAGNOSIS — E78.2 MIXED HYPERLIPIDEMIA: ICD-10-CM

## 2023-01-03 LAB
BASOPHILS # BLD AUTO: 0.06 THOUSANDS/ÂΜL (ref 0–0.1)
BASOPHILS NFR BLD AUTO: 1 % (ref 0–1)
EOSINOPHIL # BLD AUTO: 0.29 THOUSAND/ÂΜL (ref 0–0.61)
EOSINOPHIL NFR BLD AUTO: 5 % (ref 0–6)
ERYTHROCYTE [DISTWIDTH] IN BLOOD BY AUTOMATED COUNT: 13.2 % (ref 11.6–15.1)
EST. AVERAGE GLUCOSE BLD GHB EST-MCNC: 103 MG/DL
HBA1C MFR BLD: 5.2 %
HCT VFR BLD AUTO: 40.5 % (ref 34.8–46.1)
HGB BLD-MCNC: 12.5 G/DL (ref 11.5–15.4)
IMM GRANULOCYTES # BLD AUTO: 0.02 THOUSAND/UL (ref 0–0.2)
IMM GRANULOCYTES NFR BLD AUTO: 0 % (ref 0–2)
LYMPHOCYTES # BLD AUTO: 1.42 THOUSANDS/ÂΜL (ref 0.6–4.47)
LYMPHOCYTES NFR BLD AUTO: 25 % (ref 14–44)
MCH RBC QN AUTO: 28.7 PG (ref 26.8–34.3)
MCHC RBC AUTO-ENTMCNC: 30.9 G/DL (ref 31.4–37.4)
MCV RBC AUTO: 93 FL (ref 82–98)
MONOCYTES # BLD AUTO: 0.35 THOUSAND/ÂΜL (ref 0.17–1.22)
MONOCYTES NFR BLD AUTO: 6 % (ref 4–12)
NEUTROPHILS # BLD AUTO: 3.53 THOUSANDS/ÂΜL (ref 1.85–7.62)
NEUTS SEG NFR BLD AUTO: 63 % (ref 43–75)
NRBC BLD AUTO-RTO: 0 /100 WBCS
PLATELET # BLD AUTO: 270 THOUSANDS/UL (ref 149–390)
PMV BLD AUTO: 9.9 FL (ref 8.9–12.7)
RBC # BLD AUTO: 4.36 MILLION/UL (ref 3.81–5.12)
WBC # BLD AUTO: 5.67 THOUSAND/UL (ref 4.31–10.16)

## 2023-01-04 LAB
ALBUMIN SERPL BCP-MCNC: 4.2 G/DL (ref 3.5–5)
ALP SERPL-CCNC: 107 U/L (ref 46–116)
ALT SERPL W P-5'-P-CCNC: 25 U/L (ref 12–78)
ANION GAP SERPL CALCULATED.3IONS-SCNC: 3 MMOL/L (ref 4–13)
AST SERPL W P-5'-P-CCNC: 16 U/L (ref 5–45)
BILIRUB SERPL-MCNC: 0.27 MG/DL (ref 0.2–1)
BUN SERPL-MCNC: 17 MG/DL (ref 5–25)
CALCIUM SERPL-MCNC: 9.7 MG/DL (ref 8.3–10.1)
CHLORIDE SERPL-SCNC: 104 MMOL/L (ref 96–108)
CHOLEST SERPL-MCNC: 218 MG/DL
CO2 SERPL-SCNC: 29 MMOL/L (ref 21–32)
CREAT SERPL-MCNC: 1.11 MG/DL (ref 0.6–1.3)
GFR SERPL CREATININE-BSD FRML MDRD: 53 ML/MIN/1.73SQ M
GLUCOSE P FAST SERPL-MCNC: 95 MG/DL (ref 65–99)
HDLC SERPL-MCNC: 66 MG/DL
LDLC SERPL CALC-MCNC: 121 MG/DL (ref 0–100)
POTASSIUM SERPL-SCNC: 5 MMOL/L (ref 3.5–5.3)
PROT SERPL-MCNC: 8 G/DL (ref 6.4–8.4)
SODIUM SERPL-SCNC: 136 MMOL/L (ref 135–147)
T4 FREE SERPL-MCNC: 0.96 NG/DL (ref 0.76–1.46)
TRIGL SERPL-MCNC: 154 MG/DL
TSH SERPL DL<=0.05 MIU/L-ACNC: 3.74 UIU/ML (ref 0.45–4.5)

## 2023-01-06 ENCOUNTER — OFFICE VISIT (OUTPATIENT)
Dept: INTERNAL MEDICINE CLINIC | Facility: OTHER | Age: 63
End: 2023-01-06

## 2023-01-06 VITALS
HEART RATE: 68 BPM | DIASTOLIC BLOOD PRESSURE: 80 MMHG | HEIGHT: 62 IN | SYSTOLIC BLOOD PRESSURE: 150 MMHG | WEIGHT: 211 LBS | OXYGEN SATURATION: 96 % | BODY MASS INDEX: 38.83 KG/M2 | TEMPERATURE: 98 F

## 2023-01-06 DIAGNOSIS — Z72.0 TOBACCO ABUSE: ICD-10-CM

## 2023-01-06 DIAGNOSIS — F33.1 MODERATE EPISODE OF RECURRENT MAJOR DEPRESSIVE DISORDER (HCC): ICD-10-CM

## 2023-01-06 DIAGNOSIS — Z12.11 COLON CANCER SCREENING: ICD-10-CM

## 2023-01-06 DIAGNOSIS — E78.2 MIXED HYPERLIPIDEMIA: ICD-10-CM

## 2023-01-06 DIAGNOSIS — F41.9 ANXIETY: ICD-10-CM

## 2023-01-06 DIAGNOSIS — I10 ESSENTIAL HYPERTENSION: Primary | ICD-10-CM

## 2023-01-06 DIAGNOSIS — F17.210 CIGARETTE NICOTINE DEPENDENCE WITHOUT COMPLICATION: ICD-10-CM

## 2023-01-06 RX ORDER — HYDROCHLOROTHIAZIDE 12.5 MG/1
12.5 TABLET ORAL DAILY
Qty: 90 TABLET | Refills: 1 | Status: SHIPPED | OUTPATIENT
Start: 2023-01-06

## 2023-01-06 RX ORDER — BUPROPION HYDROCHLORIDE 150 MG/1
150 TABLET ORAL EVERY MORNING
Qty: 90 TABLET | Refills: 1 | Status: SHIPPED | OUTPATIENT
Start: 2023-01-06

## 2023-01-06 RX ORDER — ATORVASTATIN CALCIUM 10 MG/1
10 TABLET, FILM COATED ORAL DAILY
Qty: 90 TABLET | Refills: 1 | Status: SHIPPED | OUTPATIENT
Start: 2023-01-06

## 2023-01-06 RX ORDER — IRBESARTAN 300 MG/1
300 TABLET ORAL
Qty: 90 TABLET | Refills: 1 | Status: SHIPPED | OUTPATIENT
Start: 2023-01-06

## 2023-01-06 NOTE — PROGRESS NOTES
Assessment/Plan:    Essential hypertension  Uncontrolled  We will add hydrochlorothiazide 12 5 mg daily  Continue irbesartan 300 mg daily  He will contact our office for blood pressure readings  Recheck in 3 months  Anxiety  Stable, controlled  Continue Zoloft 50 mg daily and Wellbutrin  mg daily  BMI 36 0-36 9,adult  Discussed diet and exercise  Mixed hyperlipidemia  Discussed diet and exercise  For now, continue atorvastatin 10 mg daily  Diagnoses and all orders for this visit:    Essential hypertension  -     irbesartan (AVAPRO) 300 mg tablet; Take 1 tablet (300 mg total) by mouth daily at bedtime  -     hydrochlorothiazide (HYDRODIURIL) 12 5 mg tablet; Take 1 tablet (12 5 mg total) by mouth daily    Anxiety  -     buPROPion (Wellbutrin XL) 150 mg 24 hr tablet; Take 1 tablet (150 mg total) by mouth every morning    Moderate episode of recurrent major depressive disorder (HCC)  -     buPROPion (Wellbutrin XL) 150 mg 24 hr tablet; Take 1 tablet (150 mg total) by mouth every morning    Tobacco abuse  -     buPROPion (Wellbutrin XL) 150 mg 24 hr tablet; Take 1 tablet (150 mg total) by mouth every morning    Mixed hyperlipidemia  Comments:  Started on atorvastatin  Will recheck lipid panel in 3 months  Orders:  -     atorvastatin (LIPITOR) 10 mg tablet; Take 1 tablet (10 mg total) by mouth daily    Colon cancer screening  -     Yisel    BMI 36 0-36 9,adult    Cigarette nicotine dependence without complication        BMI Counseling: Body mass index is 38 59 kg/m²  The BMI is above normal  Nutrition recommendations include decreasing portion sizes, encouraging healthy choices of fruits and vegetables, decreasing fast food intake, consuming healthier snacks, limiting drinks that contain sugar, moderation in carbohydrate intake, increasing intake of lean protein, reducing intake of saturated and trans fat and reducing intake of cholesterol   Exercise recommendations include moderate physical activity 150 minutes/week and exercising 3-5 times per week  No pharmacotherapy was ordered  Patient referred to PCP  Rationale for BMI follow-up plan is due to patient being overweight or obese  Tobacco Cessation Counseling: Tobacco cessation counseling was provided  The patient is sincerely urged to quit consumption of tobacco  She is not ready to quit tobacco  Medication options and side effects of medication discussed  Patient refused medication  Subjective:      Patient ID: Berna Severino is a 59 y o  female  Chief Complaint   Patient presents with   • Follow-up   • hm due      Mammogram- pt would like to schedule, colonoscopy - Pt would like cologuard ordered and Phq screening    • Hypertension       29-year-old female seen today for follow-up of chronic conditions  Laboratory studies reviewed today, lipid panel slightly increased from previous  Otherwise, hemoglobin A1c, CBC, CMP, TSH are all within acceptable range  She admits to poor eating habits over the past few months  She has been compliant with her medication regimen although stopped taking hydrochlorothiazide sometime ago as she developed lower extremity edema and thought this may be due to hydrochlorothiazide  Otherwise, she has no complaints or concerns  Hypertension  This is a chronic problem  The current episode started more than 1 year ago  The problem is unchanged  The problem is uncontrolled  Associated symptoms include anxiety  Pertinent negatives include no chest pain, headaches, palpitations or shortness of breath  Past treatments include angiotensin blockers  The current treatment provides moderate improvement  Anxiety  Presents for follow-up visit  Patient reports no chest pain, dizziness, nausea, palpitations, shortness of breath or suicidal ideas  Symptoms occur rarely  The severity of symptoms is mild  The patient sleeps 8 hours per night  The quality of sleep is good  Nighttime awakenings: none  Compliance with medications is %  Hyperlipidemia  This is a chronic problem  The current episode started more than 1 year ago  The problem is controlled  Recent lipid tests were reviewed and are normal  Pertinent negatives include no chest pain or shortness of breath  Current antihyperlipidemic treatment includes statins  The current treatment provides moderate improvement of lipids  The following portions of the patient's history were reviewed and updated as appropriate: allergies, current medications, past family history, past medical history, past social history, past surgical history and problem list     Review of Systems   Constitutional: Negative for activity change, appetite change, chills, diaphoresis, fatigue and fever  HENT: Negative for congestion, postnasal drip, rhinorrhea, sinus pressure, sinus pain, sneezing and sore throat  Eyes: Negative for visual disturbance  Respiratory: Negative for apnea, cough, choking, chest tightness, shortness of breath and wheezing  Cardiovascular: Negative for chest pain, palpitations and leg swelling  Gastrointestinal: Negative for abdominal distention, abdominal pain, anal bleeding, blood in stool, constipation, diarrhea, nausea and vomiting  Endocrine: Negative for cold intolerance and heat intolerance  Genitourinary: Negative for difficulty urinating, dysuria and hematuria  Musculoskeletal: Negative  Skin: Negative  Neurological: Negative for dizziness, weakness, light-headedness, numbness and headaches  Hematological: Negative for adenopathy  Psychiatric/Behavioral: Negative for agitation, sleep disturbance and suicidal ideas  All other systems reviewed and are negative          Past Medical History:   Diagnosis Date   • Bilateral foot pain 2/16/2021   • Bilateral leg edema 8/5/2019    Side effect from amlodipine   • Cellulitis 9/13/2019   • Chronic cough 3/21/2019   • Dermatitis 8/30/2021   • Hypertension    • Hypokalemia 4/1/2020   • Lipid screening 3/5/2019   • Pain of left middle finger 9/30/2020   • Pneumonia of both lungs due to infectious organism 8/19/2019   • Right wrist tendinitis 10/28/2020         Current Outpatient Medications:   •  acetaminophen (TYLENOL) 650 mg CR tablet, Take 1 tablet (650 mg total) by mouth every 8 (eight) hours as needed for mild pain, Disp: 30 tablet, Rfl: 3  •  albuterol (Ventolin HFA) 90 mcg/act inhaler, Inhale 2 puffs every 6 (six) hours as needed for wheezing, Disp: 18 g, Rfl: 1  •  atorvastatin (LIPITOR) 10 mg tablet, Take 1 tablet (10 mg total) by mouth daily, Disp: 90 tablet, Rfl: 1  •  buPROPion (Wellbutrin XL) 150 mg 24 hr tablet, Take 1 tablet (150 mg total) by mouth every morning, Disp: 90 tablet, Rfl: 1  •  hydrochlorothiazide (HYDRODIURIL) 12 5 mg tablet, Take 1 tablet (12 5 mg total) by mouth daily, Disp: 90 tablet, Rfl: 1  •  irbesartan (AVAPRO) 300 mg tablet, Take 1 tablet (300 mg total) by mouth daily at bedtime, Disp: 90 tablet, Rfl: 1  •  sertraline (ZOLOFT) 50 mg tablet, Take 1 tablet (50 mg total) by mouth daily, Disp: 30 tablet, Rfl: 0    Allergies   Allergen Reactions   • Amlodipine Edema   • Lisinopril Cough   • Pollen Extract    • Latex Rash       Social History   Past Surgical History:   Procedure Laterality Date   • APPENDECTOMY      APPENDISITIS SURGERY AGE 26     Family History   Problem Relation Age of Onset   • COPD Mother        Objective:  /80 (BP Location: Right arm, Patient Position: Sitting, Cuff Size: Adult)   Pulse 68   Temp 98 °F (36 7 °C) (Temporal)   Ht 5' 2" (1 575 m)   Wt 95 7 kg (211 lb)   SpO2 96%   BMI 38 59 kg/m²     Recent Results (from the past 1344 hour(s))   Lipid Panel with Direct LDL reflex    Collection Time: 01/03/23 10:15 AM   Result Value Ref Range    Cholesterol 218 (H) See Comment mg/dL    Triglycerides 154 (H) See Comment mg/dL    HDL, Direct 66 >=50 mg/dL    LDL Calculated 121 (H) 0 - 100 mg/dL   CBC and differential    Collection Time: 01/03/23 10:15 AM   Result Value Ref Range    WBC 5 67 4 31 - 10 16 Thousand/uL    RBC 4 36 3 81 - 5 12 Million/uL    Hemoglobin 12 5 11 5 - 15 4 g/dL    Hematocrit 40 5 34 8 - 46 1 %    MCV 93 82 - 98 fL    MCH 28 7 26 8 - 34 3 pg    MCHC 30 9 (L) 31 4 - 37 4 g/dL    RDW 13 2 11 6 - 15 1 %    MPV 9 9 8 9 - 12 7 fL    Platelets 086 840 - 830 Thousands/uL    nRBC 0 /100 WBCs    Neutrophils Relative 63 43 - 75 %    Immat GRANS % 0 0 - 2 %    Lymphocytes Relative 25 14 - 44 %    Monocytes Relative 6 4 - 12 %    Eosinophils Relative 5 0 - 6 %    Basophils Relative 1 0 - 1 %    Neutrophils Absolute 3 53 1 85 - 7 62 Thousands/µL    Immature Grans Absolute 0 02 0 00 - 0 20 Thousand/uL    Lymphocytes Absolute 1 42 0 60 - 4 47 Thousands/µL    Monocytes Absolute 0 35 0 17 - 1 22 Thousand/µL    Eosinophils Absolute 0 29 0 00 - 0 61 Thousand/µL    Basophils Absolute 0 06 0 00 - 0 10 Thousands/µL   Comprehensive metabolic panel    Collection Time: 01/03/23 10:15 AM   Result Value Ref Range    Sodium 136 135 - 147 mmol/L    Potassium 5 0 3 5 - 5 3 mmol/L    Chloride 104 96 - 108 mmol/L    CO2 29 21 - 32 mmol/L    ANION GAP 3 (L) 4 - 13 mmol/L    BUN 17 5 - 25 mg/dL    Creatinine 1 11 0 60 - 1 30 mg/dL    Glucose, Fasting 95 65 - 99 mg/dL    Calcium 9 7 8 3 - 10 1 mg/dL    AST 16 5 - 45 U/L    ALT 25 12 - 78 U/L    Alkaline Phosphatase 107 46 - 116 U/L    Total Protein 8 0 6 4 - 8 4 g/dL    Albumin 4 2 3 5 - 5 0 g/dL    Total Bilirubin 0 27 0 20 - 1 00 mg/dL    eGFR 53 ml/min/1 73sq m   HEMOGLOBIN A1C W/ EAG ESTIMATION    Collection Time: 01/03/23 10:15 AM   Result Value Ref Range    Hemoglobin A1C 5 2 Normal 3 8-5 6%; PreDiabetic 5 7-6 4%;  Diabetic >=6 5%; Glycemic control for adults with diabetes <7 0% %     mg/dl   TSH, 3rd generation    Collection Time: 01/03/23 10:15 AM   Result Value Ref Range    TSH 3RD GENERATON 3 740 0 450 - 4 500 uIU/mL   T4, free    Collection Time: 01/03/23 10:15 AM   Result Value Ref Range    Free T4 0 96 0 76 - 1 46 ng/dL            Physical Exam  Vitals and nursing note reviewed  Constitutional:       General: She is not in acute distress  Appearance: She is well-developed  She is not diaphoretic  HENT:      Head: Normocephalic and atraumatic  Eyes:      General:         Right eye: No discharge  Left eye: No discharge  Conjunctiva/sclera: Conjunctivae normal       Pupils: Pupils are equal, round, and reactive to light  Neck:      Thyroid: No thyromegaly  Vascular: No JVD  Cardiovascular:      Rate and Rhythm: Normal rate and regular rhythm  Heart sounds: Normal heart sounds  No murmur heard  No friction rub  No gallop  Pulmonary:      Effort: Pulmonary effort is normal  No respiratory distress  Breath sounds: Normal breath sounds  No wheezing or rales  Chest:      Chest wall: No tenderness  Abdominal:      General: There is no distension  Palpations: Abdomen is soft  Tenderness: There is no abdominal tenderness  Musculoskeletal:         General: No tenderness or deformity  Normal range of motion  Cervical back: Normal range of motion and neck supple  Lymphadenopathy:      Cervical: No cervical adenopathy  Skin:     General: Skin is warm and dry  Coloration: Skin is not pale  Findings: No erythema or rash  Neurological:      Mental Status: She is alert and oriented to person, place, and time  Cranial Nerves: No cranial nerve deficit  Coordination: Coordination normal    Psychiatric:         Behavior: Behavior normal          Thought Content:  Thought content normal          Judgment: Judgment normal

## 2023-01-06 NOTE — ASSESSMENT & PLAN NOTE
Uncontrolled  We will add hydrochlorothiazide 12 5 mg daily  Continue irbesartan 300 mg daily  He will contact our office for blood pressure readings  Recheck in 3 months

## 2023-01-17 ENCOUNTER — HOSPITAL ENCOUNTER (OUTPATIENT)
Dept: RADIOLOGY | Facility: IMAGING CENTER | Age: 63
Discharge: HOME | End: 2023-01-17

## 2023-01-17 VITALS — BODY MASS INDEX: 38.83 KG/M2 | WEIGHT: 210.98 LBS | HEIGHT: 62 IN

## 2023-01-17 DIAGNOSIS — Z12.31 ENCOUNTER FOR SCREENING MAMMOGRAM FOR MALIGNANT NEOPLASM OF BREAST: ICD-10-CM

## 2023-01-31 ENCOUNTER — HOSPITAL ENCOUNTER (OUTPATIENT)
Dept: MAMMOGRAPHY | Facility: CLINIC | Age: 63
Discharge: HOME | End: 2023-01-31

## 2023-01-31 ENCOUNTER — HOSPITAL ENCOUNTER (OUTPATIENT)
Dept: ULTRASOUND IMAGING | Facility: CLINIC | Age: 63
Discharge: HOME | End: 2023-01-31

## 2023-01-31 VITALS — HEART RATE: 68 BPM | DIASTOLIC BLOOD PRESSURE: 83 MMHG | SYSTOLIC BLOOD PRESSURE: 168 MMHG

## 2023-01-31 VITALS — DIASTOLIC BLOOD PRESSURE: 91 MMHG | HEART RATE: 74 BPM | SYSTOLIC BLOOD PRESSURE: 198 MMHG

## 2023-01-31 DIAGNOSIS — R92.8 ABNORMAL ULTRASOUND OF BREAST: ICD-10-CM

## 2023-01-31 DIAGNOSIS — R92.8 ABNORMAL SCREENING MAMMOGRAM: ICD-10-CM

## 2023-01-31 RX ORDER — LIDOCAINE HYDROCHLORIDE 10 MG/ML
5 INJECTION, SOLUTION EPIDURAL; INFILTRATION; INTRACAUDAL; PERINEURAL ONCE
Status: COMPLETED | OUTPATIENT
Start: 2023-01-31 | End: 2023-01-31

## 2023-01-31 RX ADMIN — IOHEXOL 100 ML: 350 INJECTION, SOLUTION INTRAVENOUS at 13:00

## 2023-01-31 RX ADMIN — LIDOCAINE HYDROCHLORIDE 10 ML: 10 INJECTION, SOLUTION EPIDURAL; INFILTRATION; INTRACAUDAL; PERINEURAL at 13:35

## 2023-01-31 NOTE — PROGRESS NOTES
Procedure type:    __x___ultrasound guided _____stereotactic    Breast:    _____Left __x___Right     Location: 3 oclock 5 cmfn      Needle: 12g Robb    # of passes: 3    Clip: 7 5 cm KahlilKesscott     Performed by: Dr Nena Gu held for 5 minutes by: Frederick Ashton Strips:    __x___yes _____no    Band aid:    __x___yes_____no    Tape and guaze:    _____yes __x___no    Tolerated procedure:  x___yes _____no

## 2023-01-31 NOTE — DISCHARGE INSTR - OTHER ORDERS
POST LARGE CORE BREAST BIOPSY PATIENT INFORMATION      Place an ice pack inside your bra over the top of the dressing every hour for 20 minutes (20 minutes on, 60 minutes off)  Do this until bedtime  Do not shower or bathe until the following morning  You may bathe your breast carefully with the steri-strips in place  Be careful    Not to loosen them  The steri-strips will fall off in 3-5 days  You may have mild discomfort, and you may have some bruising where the   Needle entered the skin  This should clear within 5-7 days  If you need medicine for discomfort, take acetaminophen products such as   Tylenol  You may also take Advil or Motrin products  Do not participate in strenuous activities such as-tennis, aerobics, skiing,  Weight lifting, etc  for 24 hours  Refrain from swimming/soaking for 72 hours  Wearing a bra for sleeping may be more comfortable for the first 24-48 hours  Watch for continued bleeding, pain or fever over 101  If any of these symptoms occur, please contact our    breast nurse navigator at the location where your biopsy was performed  During normal business hours (7:30 am-4:00 pm) please call the nurse navigator at the site where your   procedure was performed:    [address]:  [phone] or   [address]:     [phone] or [address] [facility]:    [phone] or [phone]  [address] [facility] [place] [facility] [place]:   [phone]  [facility]:   [address] [place] [address]:     [phone]              After 4 PM - please call your physician or go to the nearest Emergency Department location  9          The final results of your biopsy are usually available within one week  PROGRESS NOTE  HOSPITALIST SERVICE    Patient: Jarred Dalton   YOB: 1969   MR Number: [id number]       Today's Date: 9/20/2021   Date of Admission: 9/18/2021   Attending Physician: Kirt Nevarez MD     Code Status:  Selective Treatment/DNR    Hospital Day: 3    Primary Care Provider: Diamond Tobin PA-C  Consulting Physician(s):   IP Consult Orders (From admission, onward)             Start     Ordered    09/18/21 1210  Inpatient consult to Palliative Care  ONE TIME        Provider:  (Not yet assigned)    09/18/21 1209              Transfusion:  None  Procedures:  None     Active Issues and Reason for Visit:  Principal Problem:    Intractable abdominal pain  Active Problems:    Brain metastases (CMS/HCC)    Malignant neoplasm of right lung, unspecified part of lung (CMS/HCC)    Metastatic cancer to bone (CMS/HCC)    Moderate dehydration    Severe protein-calorie malnutrition (CMS/HCC)      Assessment and Plan:  Problem list as noted above.    # 1 Severe diffuse abdominal pain acute on chronic unclear etiology suspected to severe constipation  CT scan of the abdomen did not show any significant intra-abdominal abnormality    -liver function tests as well as creatinine is unremarkable discuss the CT scan finding with patient as well as his wife present at bedside    -after 2 fleets enema yesterday he has some bowel movement with some improvement abdominal pain    Oxy Contin as well as Dilaudid dose was ordered as per patient's Pain Management Dr. Farrell ( DW  On phone 9/19 )     -continue long-acting OxyContin  Change to oral Dilaudid 4 mg every 4 hours as needed  Check KUB patient was also started on lactulose 3 times a day  for the bowel movement to see if that helps with the abdominal pain    • -  # 2 History of metastatic lung cancer with diffuse bony metastasis  • Follows with Oncology team as outpatient        Condition of patient is stable     Deep Vein Thrombosis/Venous Thromboembolism  Prophylaxis:    Venous Thromboembolism Pharmacologic Prophylaxis:  Yes  Venous Thromboembolism Mechanical Prophylaxis:  Yes    Tentative Discharge plan --  Estimated Discharge Date 2-3 days   Barriers:  Pending  response to current medical treatment  Destination:  Home    Goals of Care:  Patient is decisional:   Yes   Patient has power of  documents in the chart:  No  Code Status:  Full Code    Above plan of care was discussed with patient in detail.  All questions were answered and patient agreed with that.      Communication     Discussed with - Patient and RN -       Subjective:  Jarred Dalton is a 52 year old male who is being seen in follow up for Intractable abdominal pain.     9/19 severe diffuse abdominal pain 8 to 10/10 despite of the severe pain he looks not in significant pain or distress    9/20  No fever  Chills   Diffuse  abd pain and  Nausea          Past Medical/Surgical/Social/Family Histories reviewed with patient as recorded in the admit H&P (dated 9/18/2021).  Meds and Allergies reviewed with patient as recorded in EPIC.    Scheduled meds and infusions:      • docusate sodium-sennosides  2 tablet Oral BID   • lactulose  10 g Oral TID   • oxyCODONE SR  30 mg Oral 2 times per day   • sodium chloride (PF)  2 mL Intracatheter 2 times per day   • enoxaparin  40 mg Subcutaneous Daily   • pantoprazole  40 mg Oral Nightly         Review of Systems:     -  abd pain     All other   ROS   reviewed  negative except those  mentioned above     OBJECTIVE:  Vital 24 Hour Range Most Recent Value   Temperature Temp  Min: 97.5 °F (36.4 °C)  Max: 97.9 °F (36.6 °C) 97.5 °F (36.4 °C)   Pulse Pulse  Min: 60  Max: 67 67   Respiratory Resp  Min: 16  Max: 20 16   Blood Pressure BP  Min: 115/62  Max: 136/75 115/62   Pulse Oximetry SpO2  Min: 95 %  Max: 100 %    O2 No data recorded      Vital Most Recent Value First Value   Weight  (not in the bed at this time) (09/20/21 9354) Weight: 72.9 kg (09/18/21 1059)    BMI Body mass index is 22.38 kg/m². BMI (Calculated): 22.42 (09/18/21 1059)       Intake/Output Summary (Last 24 hours) at 9/20/2021 1046  Last data filed at 9/20/2021 0448  Gross per 24 hour   Intake 1541 ml   Output 600 ml   Net 941 ml       Current diet:  Regular Diet  Daily W Breakfast; Ensure Enlive/standard Oral Supplement, Vanilla Oral Nutrition Supplement  Daily W Dinner; Ensure Enlive/standard Oral Supplement, Chocolate Oral Nutrition Supplement    Physical Exam:    Visit Vitals  /62 (BP Location: LUE - Left upper extremity, Patient Position: Sitting)   Pulse 67   Temp 97.5 °F (36.4 °C) (Temporal)   Resp 16   Ht 5' 11\" (1.803 m)   Wt 72.8 kg   SpO2 100%   BMI 22.38 kg/m²     General Appearance:  alert and appears stated age   Heart:  regular rate and rhythm, S1, S2 normal, no murmur, click, rub or gallop   Lungs:  clear to auscultation bilaterally   Abdomen:  soft, non-tender; bowel sounds normal; no masses,  no organomegaly   Extremities:  extremities normal, atraumatic, no cyanosis or edema   Pulses:  +2 Bilateral Dorsalis Pedis   Neurological:  Alert and oriented X 3, normal strength and tone. Normal symmetric reflexes. Normal coordination and gait    Ancillary Studies including laboratory results are reviewed as recorded in EPIC 9/20/2021 10:46 AM.  A subset of labs and results are listed below:    Recent Labs   Lab 09/20/21  0600 09/19/21  0507 09/18/21  1102 09/17/21  1110   WBC 3.1* 5.1 6.0 4.6   HGB 11.5* 12.4* 14.2 12.7*   HCT 34.1* 36.9* 40.9 37.8*   * 139* 148 125*      Recent Labs   Lab 09/20/21  0600 09/19/21  0507 09/18/21  1102 09/17/21  1110   SODIUM 142 143 139 140   POTASSIUM 3.8 4.0 3.5 3.9   CHLORIDE 106 107 99 104   CO2 33* 33* 29 32   BUN 7 8 10 8   CREATININE 0.66* 0.59* 0.58* 0.67   GLUCOSE 90 102* 114* 103*   CALCIUM 7.5* 8.0* 8.5 8.0*   ALBUMIN 3.2* 3.4* 4.2 3.7   AST 14 12 15 14   BILIRUBIN 0.6 0.6 1.1* 0.8         Imaging studies    -reviewed from Epic      Microbiology:   Reviewed in Epic     Kirt Nevarez MD  Attending Hospitalist     9/20/2021  10:46 AM    Pager - [phone]

## 2023-02-02 ENCOUNTER — TELEPHONE (OUTPATIENT)
Dept: MAMMOGRAPHY | Facility: CLINIC | Age: 63
End: 2023-02-02

## 2023-02-02 NOTE — TELEPHONE ENCOUNTER
[facility]    Diagnosis: Invasive ductal carcinoma and DCIS    Is this diagnosis cancer (Y/N):yes    Biopsy Date: 1/31/2023    Does the patient have another biopsy pending:no  If so, when:    Preferred provider:Becky    Preferred location:[facility]  Any requests for dates/times:     Any additional information:     Please advise when appointment is made yes

## 2023-02-02 NOTE — TELEPHONE ENCOUNTER
I just wanted to inform you that the above patient was given her positive breast biopsy results today by Dr Franky Kidd  The patient was referred to breast surgeon Dr Frost Manual  [facility] notified and will reach out with appointment  If you have any questions or if I can be of any further assistance please let me know    Thank you,  Serene Greco, BSN, RN  Breast Nurse Navigator

## 2023-02-03 ENCOUNTER — PATIENT OUTREACH (OUTPATIENT)
Dept: SURGICAL ONCOLOGY | Facility: CLINIC | Age: 63
End: 2023-02-03

## 2023-02-03 ENCOUNTER — TELEPHONE (OUTPATIENT)
Dept: MAMMOGRAPHY | Facility: CLINIC | Age: 63
End: 2023-02-03

## 2023-02-03 NOTE — PROGRESS NOTES
Amie Velasquez reached out to patient for general assessment  Pt is at work and will call after 330 today

## 2023-02-03 NOTE — TELEPHONE ENCOUNTER
I just wanted to inform you that the above patient was given her positive breast biopsy results today by Dr Childress Purchase  The patient was referred and set up with surgeon Dr Hansel Nageotte and has an sanjana on February 22,2023   If you have any questions or if I can be of any further assistance please let me know    Thank you,  Aracely Rees, FRACISCON, RN  Breast Nurse Navigator

## 2023-02-08 ENCOUNTER — DOCUMENTATION (OUTPATIENT)
Dept: HEMATOLOGY ONCOLOGY | Facility: CLINIC | Age: 63
End: 2023-02-08

## 2023-02-08 NOTE — PROGRESS NOTES
Intake received/ Chart reviewed for services completed outside of [facility]    Pathology completed:Yes, completed on 1-    Imaging completed:Mammo screening done on 1-17-23  Diagnostic Mammo & US done on 1-31-23    All records needed are in patients chart  No records retrieval needed at this time

## 2023-02-22 ENCOUNTER — CONSULT (OUTPATIENT)
Dept: SURGICAL ONCOLOGY | Facility: CLINIC | Age: 63
End: 2023-02-22

## 2023-02-22 VITALS
HEART RATE: 89 BPM | TEMPERATURE: 98.6 F | RESPIRATION RATE: 16 BRPM | OXYGEN SATURATION: 97 % | HEIGHT: 62 IN | BODY MASS INDEX: 39.2 KG/M2 | DIASTOLIC BLOOD PRESSURE: 80 MMHG | SYSTOLIC BLOOD PRESSURE: 138 MMHG | WEIGHT: 213 LBS

## 2023-02-22 DIAGNOSIS — Z72.0 TOBACCO ABUSE: ICD-10-CM

## 2023-02-22 DIAGNOSIS — C50.311 MALIGNANT NEOPLASM OF LOWER-INNER QUADRANT OF RIGHT BREAST OF FEMALE, ESTROGEN RECEPTOR POSITIVE (HCC): Primary | ICD-10-CM

## 2023-02-22 DIAGNOSIS — Z17.0 MALIGNANT NEOPLASM OF LOWER-INNER QUADRANT OF RIGHT BREAST OF FEMALE, ESTROGEN RECEPTOR POSITIVE (HCC): Primary | ICD-10-CM

## 2023-02-22 RX ORDER — CEFAZOLIN SODIUM 2 G/50ML
2000 SOLUTION INTRAVENOUS
OUTPATIENT
Start: 2023-02-22

## 2023-02-22 RX ORDER — TRAMADOL HYDROCHLORIDE 50 MG/1
50 TABLET ORAL EVERY 8 HOURS PRN
Qty: 9 TABLET | Refills: 0 | Status: SHIPPED | OUTPATIENT
Start: 2023-02-22

## 2023-02-22 NOTE — PROGRESS NOTES
Breast Consultation-Surgical Oncology     [facility]  [facility] David Alvaradolaney RODAS [facility] [address]    Name:  Elizabeth Arredondo  YOB: 1960  MRN:  [id number]    Assessment/Plan   Diagnoses and all orders for this visit:    Malignant neoplasm of lower-inner quadrant of right breast of female, estrogen receptor positive ([facility] [address] )  -     Ambulatory Referral to Smoking Cessation Program; Future  -     traMADol (ULTRAM) 50 mg tablet; Take 1 tablet (50 mg total) by mouth every 8 (eight) hours as needed for moderate pain    Tobacco abuse  -     Ambulatory Referral to Smoking Cessation Program; Future    Other orders  -     Reason for no Pharmacologic VTE Prophylaxis; Standing  -     Apply SCD or Foot pumps; Standing  -     ceFAZolin (ANCEF) IVPB (premix in dextrose) 2,000 mg 50 mL        HPI: Elizabeth Arredondo is a 59y o  year old female who presents with newly diagnosed carcinoma of the right breast   She reports that she did notice a mass in the breast   She however had a screening mammogram which led to diagnostic imaging and a biopsy  She reports family history of ovarian cancer in a maternal grandmother  She is not aware of any genetic testing that has been done in the family  Surgical treatment to date consisted of not applicable      Oncology History:    Oncology History   Malignant neoplasm of lower-inner quadrant of right breast of female, estrogen receptor positive ([facility] [address] )   2/22/2023 Initial Diagnosis    Malignant neoplasm of lower-inner quadrant of right breast of female, estrogen receptor positive ([facility] [address] )     2/22/2023 -  Cancer Staged    Staging form: Breast, AJCC 8th Edition  - Clinical stage from 2/22/2023: Stage IIA (cT2, cN0, cM0, G3, ER+, AZ+, HER2-) - Signed by Muriel Gaines MD on 2/22/2023  Stage prefix: Initial diagnosis  Method of lymph node assessment: Clinical  Histologic grading system: 3 grade system           Pertinent reproductive history:  Age at menarche:    OB History        1    Para   1    Term   1            AB        Living           SAB        IAB        Ectopic        Multiple        Live Births               Obstetric Comments   Age at menarche unk  Age at first pregnancy 22  Age at menopause 49               Problem List:   Patient Active Problem List   Diagnosis   • Essential hypertension   • Chronic right shoulder pain   • Encounter for hepatitis C virus screening test for high risk patient   • Colon cancer screening   • Gait abnormality   • Cigarette nicotine dependence without complication   • Breast cancer screening   • Seasonal allergic rhinitis due to pollen   • Mixed hyperlipidemia   • Major depressive disorder   • Anxiety   • Tobacco abuse   • Influenza vaccine refused   • BMI 36 0-36 9,adult   • Malignant neoplasm of lower-inner quadrant of right breast of female, estrogen receptor positive ([facility] [address] )     Past Medical History:   Diagnosis Date   • Bilateral foot pain 2021   • Bilateral leg edema 2019    Side effect from amlodipine   • Cellulitis 2019   • Chronic cough 2019   • Dermatitis 2021   • Hypertension    • Hypokalemia 2020   • Indigestion    • Lipid screening 2019   • Pain of left middle finger 2020   • Pneumonia of both lungs due to infectious organism 2019   • Right wrist tendinitis 10/28/2020     Past Surgical History:   Procedure Laterality Date   • APPENDECTOMY      APPENDISITIS SURGERY AGE 19   • BREAST BIOPSY Right 2023   • US GUIDED BREAST BIOPSY RIGHT COMPLETE Right 2023     Family History   Problem Relation Age of Onset   • COPD Mother    • No Known Problems Father    • No Known Problems Brother    • No Known Problems Brother    • No Known Problems Brother    • No Known Problems Daughter    • No Known Problems Maternal Aunt    • Lung cancer Maternal Uncle         age at dx unk   • Ovarian cancer Maternal Grandmother         age at dx unk   • Prostate cancer Maternal Grandfather         age at dx unk   • No Known Problems Paternal Grandmother    • No Known Problems Paternal Grandfather    • Breast cancer Neg Hx    • Colon cancer Neg Hx      Social History     Socioeconomic History   • Marital status:      Spouse name: Not on file   • Number of children: Not on file   • Years of education: Not on file   • Highest education level: Not on file   Occupational History   • Not on file   Tobacco Use   • Smoking status: Every Day     Packs/day: 0 50     Types: Cigarettes   • Smokeless tobacco: Never   • Tobacco comments:     10 CIGARETTES PER DAY - NO PASSIVE SMOKE EXPOSURE    Vaping Use   • Vaping Use: Never used   Substance and Sexual Activity   • Alcohol use: Not Currently     Alcohol/week: 7 0 standard drinks     Types: 7 Glasses of wine per week     Comment: quit 2 years ago Oct 2020   • Drug use: Not Currently   • Sexual activity: Not on file   Other Topics Concern   • Not on file   Social History Narrative   • Not on file     Social Determinants of Health     Financial Resource Strain: Not on file   Food Insecurity: Not on file   Transportation Needs: Not on file   Physical Activity: Not on file   Stress: Not on file   Social Connections: Not on file   Intimate Partner Violence: Not on file   Housing Stability: Not on file     Current Outpatient Medications   Medication Sig Dispense Refill   • acetaminophen (TYLENOL) 650 mg CR tablet Take 1 tablet (650 mg total) by mouth every 8 (eight) hours as needed for mild pain 30 tablet 3   • albuterol (Ventolin HFA) 90 mcg/act inhaler Inhale 2 puffs every 6 (six) hours as needed for wheezing 18 g 1   • atorvastatin (LIPITOR) 10 mg tablet Take 1 tablet (10 mg total) by mouth daily 90 tablet 1   • buPROPion (Wellbutrin XL) 150 mg 24 hr tablet Take 1 tablet (150 mg total) by mouth every morning 90 tablet 1   • hydrochlorothiazide (HYDRODIURIL) 12 5 mg tablet Take 1 tablet (12 5 mg total) by mouth daily 90 tablet 1   • irbesartan (AVAPRO) 300 mg tablet Take 1 tablet (300 mg total) by mouth daily at bedtime 90 tablet 1   • sertraline (ZOLOFT) 50 mg tablet Take 1 tablet (50 mg total) by mouth daily 30 tablet 0   • traMADol (ULTRAM) 50 mg tablet Take 1 tablet (50 mg total) by mouth every 8 (eight) hours as needed for moderate pain 9 tablet 0     No current facility-administered medications for this visit  Allergies   Allergen Reactions   • Amlodipine Edema   • Lisinopril Cough   • Pollen Extract    • Latex Rash         The following portions of the patient's history were reviewed and updated as appropriate: allergies, current medications, past family history, past medical history, past social history, past surgical history and problem list     Review of Systems:  Review of Systems   Constitutional: Negative  Negative for appetite change and fever  Eyes: Negative  Respiratory: Negative for shortness of breath  Cardiovascular: Negative  Gastrointestinal: Negative  Endocrine: Negative  Genitourinary: Negative  Musculoskeletal: Negative  Negative for arthralgias and myalgias  Skin: Negative  Allergic/Immunologic: Negative  Neurological: Negative  Hematological: Negative  Negative for adenopathy  Does not bruise/bleed easily  Psychiatric/Behavioral: Negative  Physical Exam:  Physical Exam  Constitutional:       General: She is not in acute distress  Appearance: Normal appearance  She is well-developed  HENT:      Head: Normocephalic and atraumatic  Cardiovascular:      Heart sounds: Normal heart sounds  Pulmonary:      Breath sounds: Normal breath sounds  Chest:   Breasts:     Right: Mass present  No inverted nipple, nipple discharge, skin change or tenderness  Left: No inverted nipple, mass, nipple discharge, skin change or tenderness  Abdominal:      Palpations: Abdomen is soft  Musculoskeletal:      Right lower leg: No edema        Left lower leg: No edema  Lymphadenopathy:      Upper Body:      Right upper body: No supraclavicular, axillary or pectoral adenopathy  Left upper body: No supraclavicular, axillary or pectoral adenopathy  Neurological:      Mental Status: She is alert and oriented to person, place, and time  Psychiatric:         Mood and Affect: Mood normal          Laboratory:  2023 core biopsy right breast 3:00    Pathology revealed: invasive ductal carcinoma    Histologic grade: high grade     Angiolymphatic invasion:  absent    Tumor node status:  Negative    Hormone receptor status: Estrogen receptor 95, progesterone 25, HER2 is 1+        Imagin2023 bilateral 3D screening mammogram was a BI-RADS 0 secondary to a 2 7 cm irregular mass right breast 3:00 as well as a second oval lesion at the 11:00 axis, left side was benign, density is a 2    2023 bilateral contrast-enhanced mammogram as well as right breast ultrasound again shows a 2 7 cm irregular mass in the inner aspect of the right breast at 3:00, there was enhancement noted on the mammogram, there was a sonographic correlate, the second area at 11:00 showed a simple appearing cyst, left side was negative    2023 postbiopsy mammogram is concordant        Discussion/Summary: 52-year-old female who presents with a right breast carcinoma  She has a clinical T2N0 high-grade invasive ductal carcinoma  This is hormone positive and HER2 negative  I discussed these findings with her  We reviewed the multimodality treatment of breast cancer to include surgery, radiation and medical therapy  Based on exam and imaging she is a good candidate for breast conservation  She might have some deformity to the breast which she is not concerned about  We discussed the alternative of a mastectomy  She does have family history of ovarian cancer  She was counseled on genetic testing  She currently is declining  She would like to proceed with breast conservation  She has a MARY reflector in place  She was counseled on a MARY localized lumpectomy of the right breast along with lymphatic mapping and sentinel node biopsy  She understands that she will need radiation therapy  She understands that she will also meet with medical oncology in the postoperative setting and will need at least adjuvant hormone therapy  We briefly discussed genomic profiles as well  All of her questions were answered  Consent was signed today in the office  She will be scheduled for surgery in the near term  Additionally, I had conversation with her about her tobacco use  She is aware that this can impact wound healing  I am referring her to our smoking cessation program   I am also giving her patient information for her reference

## 2023-03-02 ENCOUNTER — PATIENT OUTREACH (OUTPATIENT)
Dept: HEMATOLOGY ONCOLOGY | Facility: CLINIC | Age: 63
End: 2023-03-02

## 2023-03-02 NOTE — PROGRESS NOTES
ONN attempted outreach to discuss upcoming surgery, offer support, offer to schedule medical oncology and again attempt general assessment  Unable to get a hold of patient  Voice mail would not accept message

## 2023-03-07 ENCOUNTER — CONSULT (OUTPATIENT)
Dept: INTERNAL MEDICINE CLINIC | Facility: OTHER | Age: 63
End: 2023-03-07

## 2023-03-07 ENCOUNTER — APPOINTMENT (OUTPATIENT)
Dept: LAB | Facility: IMAGING CENTER | Age: 63
End: 2023-03-07

## 2023-03-07 ENCOUNTER — HOSPITAL ENCOUNTER (OUTPATIENT)
Dept: RADIOLOGY | Facility: IMAGING CENTER | Age: 63
Discharge: HOME | End: 2023-03-07

## 2023-03-07 VITALS
HEIGHT: 62 IN | OXYGEN SATURATION: 98 % | BODY MASS INDEX: 38.76 KG/M2 | HEART RATE: 75 BPM | DIASTOLIC BLOOD PRESSURE: 84 MMHG | WEIGHT: 210.6 LBS | RESPIRATION RATE: 18 BRPM | SYSTOLIC BLOOD PRESSURE: 148 MMHG | TEMPERATURE: 98.1 F

## 2023-03-07 DIAGNOSIS — Z17.0 MALIGNANT NEOPLASM OF LOWER-INNER QUADRANT OF RIGHT BREAST OF FEMALE, ESTROGEN RECEPTOR POSITIVE (HCC): ICD-10-CM

## 2023-03-07 DIAGNOSIS — Z01.818 PRE-OPERATIVE CLEARANCE: Primary | ICD-10-CM

## 2023-03-07 DIAGNOSIS — I10 ESSENTIAL HYPERTENSION: ICD-10-CM

## 2023-03-07 DIAGNOSIS — C50.311 MALIGNANT NEOPLASM OF LOWER-INNER QUADRANT OF RIGHT BREAST OF FEMALE, ESTROGEN RECEPTOR POSITIVE (HCC): ICD-10-CM

## 2023-03-07 DIAGNOSIS — Z72.0 TOBACCO ABUSE: ICD-10-CM

## 2023-03-07 LAB
ALBUMIN SERPL BCP-MCNC: 4.1 G/DL (ref 3.5–5)
ALP SERPL-CCNC: 99 U/L (ref 46–116)
ALT SERPL W P-5'-P-CCNC: 22 U/L (ref 12–78)
ANION GAP SERPL CALCULATED.3IONS-SCNC: 3 MMOL/L (ref 4–13)
AST SERPL W P-5'-P-CCNC: 16 U/L (ref 5–45)
BACTERIA UR QL AUTO: NORMAL /HPF
BASOPHILS # BLD AUTO: 0.06 THOUSANDS/ÂΜL (ref 0–0.1)
BASOPHILS NFR BLD AUTO: 1 % (ref 0–1)
BILIRUB SERPL-MCNC: 0.43 MG/DL (ref 0.2–1)
BILIRUB UR QL STRIP: NEGATIVE
BUN SERPL-MCNC: 20 MG/DL (ref 5–25)
CALCIUM SERPL-MCNC: 9.5 MG/DL (ref 8.3–10.1)
CHLORIDE SERPL-SCNC: 104 MMOL/L (ref 96–108)
CLARITY UR: CLEAR
CO2 SERPL-SCNC: 28 MMOL/L (ref 21–32)
COLOR UR: ABNORMAL
CREAT SERPL-MCNC: 0.99 MG/DL (ref 0.6–1.3)
EOSINOPHIL # BLD AUTO: 0.24 THOUSAND/ÂΜL (ref 0–0.61)
EOSINOPHIL NFR BLD AUTO: 4 % (ref 0–6)
ERYTHROCYTE [DISTWIDTH] IN BLOOD BY AUTOMATED COUNT: 14 % (ref 11.6–15.1)
GFR SERPL CREATININE-BSD FRML MDRD: 61 ML/MIN/1.73SQ M
GLUCOSE P FAST SERPL-MCNC: 95 MG/DL (ref 65–99)
GLUCOSE UR STRIP-MCNC: NEGATIVE MG/DL
HCT VFR BLD AUTO: 36.7 % (ref 34.8–46.1)
HGB BLD-MCNC: 11.6 G/DL (ref 11.5–15.4)
HGB UR QL STRIP.AUTO: ABNORMAL
IMM GRANULOCYTES # BLD AUTO: 0.01 THOUSAND/UL (ref 0–0.2)
IMM GRANULOCYTES NFR BLD AUTO: 0 % (ref 0–2)
KETONES UR STRIP-MCNC: NEGATIVE MG/DL
LEUKOCYTE ESTERASE UR QL STRIP: NEGATIVE
LYMPHOCYTES # BLD AUTO: 1.52 THOUSANDS/ÂΜL (ref 0.6–4.47)
LYMPHOCYTES NFR BLD AUTO: 27 % (ref 14–44)
MCH RBC QN AUTO: 29.1 PG (ref 26.8–34.3)
MCHC RBC AUTO-ENTMCNC: 31.6 G/DL (ref 31.4–37.4)
MCV RBC AUTO: 92 FL (ref 82–98)
MONOCYTES # BLD AUTO: 0.35 THOUSAND/ÂΜL (ref 0.17–1.22)
MONOCYTES NFR BLD AUTO: 6 % (ref 4–12)
NEUTROPHILS # BLD AUTO: 3.5 THOUSANDS/ÂΜL (ref 1.85–7.62)
NEUTS SEG NFR BLD AUTO: 62 % (ref 43–75)
NITRITE UR QL STRIP: NEGATIVE
NON-SQ EPI CELLS URNS QL MICRO: NORMAL /HPF
NRBC BLD AUTO-RTO: 0 /100 WBCS
PH UR STRIP.AUTO: 6 [PH]
PLATELET # BLD AUTO: 302 THOUSANDS/UL (ref 149–390)
PMV BLD AUTO: 9.3 FL (ref 8.9–12.7)
POTASSIUM SERPL-SCNC: 4.4 MMOL/L (ref 3.5–5.3)
PROT SERPL-MCNC: 7.7 G/DL (ref 6.4–8.4)
PROT UR STRIP-MCNC: ABNORMAL MG/DL
RBC # BLD AUTO: 3.99 MILLION/UL (ref 3.81–5.12)
RBC #/AREA URNS AUTO: NORMAL /HPF
SODIUM SERPL-SCNC: 135 MMOL/L (ref 135–147)
SP GR UR STRIP.AUTO: 1.02 (ref 1–1.03)
UROBILINOGEN UR STRIP-ACNC: <2 MG/DL
WBC # BLD AUTO: 5.68 THOUSAND/UL (ref 4.31–10.16)
WBC #/AREA URNS AUTO: NORMAL /HPF

## 2023-03-07 NOTE — ASSESSMENT & PLAN NOTE
-patient considered low risk and may proceed with right breast lumpectomy with Dr Shannan Sosa on 3/28/2023   -patient counseled on tobacco abuse and cessation  Patient has no desire to stop smoking at this point in time  -EKG, chest x-ray where completed  Patient to get lab work done prior to 3/10

## 2023-03-07 NOTE — PROGRESS NOTES
Subjective:    Susana Corbin is a 59y o  year old female who presents to the office today for a preoperative consultation at the request of surgeon Dr Aviva Kelsey who plans on performing a right breast lumpectomy on March 28, 2023  This consultation is requested for the specific conditions prompting preoperative evaluation (i e  because of potential affect on operative risk): Planned anesthesia: general  The patient has the following known anesthesia issues: none per patient reporting   Patients bleeding risk: no recent abnormal bleeding, no remote history of abnormal bleeding and no use of Ca-channel blockers  Patient does not have objections to receiving blood products if needed  Patient is able to walk 4 blocks without symptoms  Patient is able to walk up 2 flights of stairs without symptoms  Significant past medical history includes  Essential Hypertension, tobacco abuse, elevated BMI, hyperlipidemia and patient does wear full upper dentures  Tobacco use: yes  One pack over three days  Education and counseling provided  Patient denies any interest in quitting smoking at this time  Alcohol use: no  Illicit drug use: no    Symptoms:   Easy bleeding: no  Easy bruising: no  Frequent nose bleeds: no  Chest pain: no  Cough: no  Dyspnea on exertion:no  Edema: no  Palpitations: no  Wheezing: no    Living situation: Patient lives alone in a first floor apartment with 3 steps to get into her door  Daughter will be caring for her after surgery for one week, she states she will be staying in a hotel with daughter for the first week postoperatively  she does have post-op concerns  Patient is worried about getting dressed, showering and just general self care after surgery  Discussed concerns with patient       The following portions of the patient's history were reviewed and updated as appropriate: allergies, current medications, past family history, past medical history, past social history, past surgical history and problem list     Review of Systems  Review of Systems   Constitutional: Negative for chills and fatigue  HENT: Negative for congestion, postnasal drip, sinus pressure, sinus pain, sneezing and sore throat  Respiratory: Negative for cough, chest tightness, shortness of breath and wheezing  Cardiovascular: Negative for chest pain, palpitations and leg swelling  Gastrointestinal: Negative for abdominal pain, constipation, diarrhea, nausea and vomiting  Musculoskeletal: Negative for myalgias  Neurological: Negative for dizziness, tremors, seizures, syncope, weakness, light-headedness, numbness and headaches           Past Medical History:   Diagnosis Date   • Bilateral foot pain 02/16/2021   • Bilateral leg edema 08/05/2019    Side effect from amlodipine   • Cellulitis 09/13/2019   • Chronic cough 03/21/2019   • Dermatitis 08/30/2021   • Hypertension    • Hypokalemia 04/01/2020   • Indigestion    • Lipid screening 03/05/2019   • Pain of left middle finger 09/30/2020   • Pneumonia of both lungs due to infectious organism 08/19/2019   • Right wrist tendinitis 10/28/2020     Past Surgical History:   Procedure Laterality Date   • APPENDECTOMY      APPENDISITIS SURGERY AGE 19   • BREAST BIOPSY Right 01/31/2023   • US GUIDED BREAST BIOPSY RIGHT COMPLETE Right 01/31/2023     Social History     Tobacco Use   • Smoking status: Every Day     Packs/day: 0 50     Years: 45 00     Pack years: 22 50     Types: Cigarettes   • Smokeless tobacco: Never   • Tobacco comments:     10 CIGARETTES PER DAY - NO PASSIVE SMOKE EXPOSURE    Vaping Use   • Vaping Use: Never used   Substance Use Topics   • Alcohol use: Not Currently     Alcohol/week: 7 0 standard drinks     Types: 7 Glasses of wine per week     Comment: quit 2 years ago Oct 2020   • Drug use: Not Currently     Family History   Problem Relation Age of Onset   • COPD Mother    • No Known Problems Father    • No Known Problems Brother    • No Known Problems Brother    • No Known Problems Brother    • No Known Problems Daughter    • No Known Problems Maternal Aunt    • Lung cancer Maternal Uncle         age at dx unk   • Ovarian cancer Maternal Grandmother         age at dx unk   • Prostate cancer Maternal Grandfather         age at dx unk   • No Known Problems Paternal Grandmother    • No Known Problems Paternal Grandfather    • Breast cancer Neg Hx    • Colon cancer Neg Hx      Amlodipine, Lisinopril, Pollen extract, and Latex  Current Outpatient Medications   Medication Sig Dispense Refill   • acetaminophen (TYLENOL) 650 mg CR tablet Take 1 tablet (650 mg total) by mouth every 8 (eight) hours as needed for mild pain 30 tablet 3   • albuterol (Ventolin HFA) 90 mcg/act inhaler Inhale 2 puffs every 6 (six) hours as needed for wheezing 18 g 1   • atorvastatin (LIPITOR) 10 mg tablet Take 1 tablet (10 mg total) by mouth daily 90 tablet 1   • buPROPion (Wellbutrin XL) 150 mg 24 hr tablet Take 1 tablet (150 mg total) by mouth every morning 90 tablet 1   • hydrochlorothiazide (HYDRODIURIL) 12 5 mg tablet Take 1 tablet (12 5 mg total) by mouth daily 90 tablet 1   • irbesartan (AVAPRO) 300 mg tablet Take 1 tablet (300 mg total) by mouth daily at bedtime 90 tablet 1   • sertraline (ZOLOFT) 50 mg tablet Take 1 tablet (50 mg total) by mouth daily 30 tablet 0   • traMADol (ULTRAM) 50 mg tablet Take 1 tablet (50 mg total) by mouth every 8 (eight) hours as needed for moderate pain 9 tablet 0     No current facility-administered medications for this visit  Objective:       /84 (BP Location: Left arm, Patient Position: Sitting, Cuff Size: Large)   Pulse 75   Temp 98 1 °F (36 7 °C) (Temporal)   Resp 18   Ht 5' 2" (1 575 m)   Wt 95 5 kg (210 lb 9 6 oz)   SpO2 98%   BMI 38 52 kg/m²   Physical Exam  Vitals and nursing note reviewed  Constitutional:       Appearance: She is obese  HENT:      Head: Normocephalic and atraumatic        Right Ear: Tympanic membrane, ear canal and external ear normal       Left Ear: Tympanic membrane, ear canal and external ear normal       Nose: Nose normal       Mouth/Throat:      Mouth: Mucous membranes are moist    Eyes:      Extraocular Movements: Extraocular movements intact  Conjunctiva/sclera: Conjunctivae normal       Pupils: Pupils are equal, round, and reactive to light  Cardiovascular:      Rate and Rhythm: Normal rate and regular rhythm  Pulses: Normal pulses  Heart sounds: Normal heart sounds  No murmur heard  No gallop  Pulmonary:      Effort: Pulmonary effort is normal  No respiratory distress  Breath sounds: Normal breath sounds  No stridor  No wheezing or rhonchi  Abdominal:      General: Bowel sounds are normal       Tenderness: There is no abdominal tenderness  Musculoskeletal:      Cervical back: Normal range of motion  Skin:     General: Skin is warm and dry  Capillary Refill: Capillary refill takes less than 2 seconds  Neurological:      Mental Status: She is alert and oriented to person, place, and time  Psychiatric:         Mood and Affect: Mood normal          Behavior: Behavior normal             Cardiographics  ECG: normal sinus rhythm, no blocks or conduction defects, no ischemic changes    Imaging  Chest x-ray: normal      Lab Review - note: pt did not get updated labs prior to this appointment  Last CMP and CBC from 1/3/2023  Latest CMP, CBCD and UA pending  Lab Results   Component Value Date    K 5 0 01/03/2023     01/03/2023    CO2 29 01/03/2023    BUN 17 01/03/2023    CREATININE 1 11 01/03/2023    CALCIUM 9 7 01/03/2023     Lab Results   Component Value Date    WBC 5 67 01/03/2023    HGB 12 5 01/03/2023    HCT 40 5 01/03/2023    MCV 93 01/03/2023     01/03/2023        Assessment:     59 y o  male or female with planned surgery as above      Known risk factors for perioperative complications: None      Cardiac Risk Estimation: low-risk     Ayla Javier is cleared from a cardiovascular standpoint to proceed with surgery  she is at a low risk from a cardiovascular standpoint at this time without any additional cardiac testing  Reevaluation needed if she should present with symptoms prior to surgery  Plan:  Preoperative workup as follows chest x-ray, ECG, hemoglobin, hematocrit, electrolytes, creatinine, glucose, liver function studies, urinalysis (urinary tract instrumentation planned)  Change in medication regimen before surgery: none, continue medication regimen including morning of surgery, with sip of water  Assessment/Plan:    Problem List Items Addressed This Visit        Cardiovascular and Mediastinum    Essential hypertension     -patient currently on irbesartan 300 mg daily and HCTZ 12 5 mg daily  -patients blood pressure still remains above goal  BP today in office is 148/84    -patient reports she does not monitor her BP at home but does have it checked at work  She reports her BP can range from 130-150/80's    -will consider adjusting medication at next office visit if still poorly controlled    -discussed low sodium, heart healthy diet, weight management and exercise as lifestyle modifications              Other    Tobacco abuse     -patient currently still an active smoker  Pt reports smoking one pack over a 3 day period   -educated and counseled  Patient was given a previous referral and offered medication but declines  -patient is not ready to quit and has no desire at this time         Pre-operative clearance - Primary     -patient considered low risk and may proceed with right breast lumpectomy with Dr Denia Zimmerman on 3/28/2023   -patient counseled on tobacco abuse and cessation  Patient has no desire to stop smoking at this point in time  -EKG, chest x-ray where completed  Patient to get lab work done prior to 3/10  BMI Counseling: Body mass index is 38 52 kg/m²  Discussed the patient's BMI with she   The BMI is above normal  Nutrition recommendations include reducing portion sizes, decreasing overall calorie intake, 3-5 servings of fruits/vegetables daily, reducing fast food intake, consuming healthier snacks, decreasing soda and/or juice intake, moderation in carbohydrate intake, increasing intake of lean protein, reducing intake of saturated fat and trans fat and reducing intake of cholesterol  Exercise recommendations include moderate aerobic physical activity for 150 minutes/week, exercising 3-5 times per week and strength training exercises

## 2023-03-07 NOTE — ASSESSMENT & PLAN NOTE
-patient currently still an active smoker  Pt reports smoking one pack over a 3 day period   -educated and counseled   Patient was given a previous referral and offered medication but declines  -patient is not ready to quit and has no desire at this time

## 2023-03-07 NOTE — ASSESSMENT & PLAN NOTE
-patient currently on irbesartan 300 mg daily and HCTZ 12 5 mg daily  -patients blood pressure still remains above goal  BP today in office is 148/84    -patient reports she does not monitor her BP at home but does have it checked at work   She reports her BP can range from 130-150/80's    -will consider adjusting medication at next office visit if still poorly controlled    -discussed low sodium, heart healthy diet, weight management and exercise as lifestyle modifications

## 2023-03-14 ENCOUNTER — TELEPHONE (OUTPATIENT)
Dept: OBGYN CLINIC | Facility: OTHER | Age: 63
End: 2023-03-14

## 2023-03-14 NOTE — TELEPHONE ENCOUNTER
Patient Call Form   Reason for patient call? Pt called to get best fax number for her fmla and short term disability paperwork  Patient's primary oncologist? Dr Jeffrey Norman    Name of person the patient was calling for?  surg onc staff   Does the patient issue require a call back? Yes   If call back required, inform patient that the message will be forwarded to the team and someone will get back to them as soon as possible    Did you relay this information to the patient?  Yes

## 2023-03-14 NOTE — TELEPHONE ENCOUNTER
Returned call to Brenna Lopez and provided her with the contact information to send her FMLA Forms to  She was appreciative

## 2023-03-16 ENCOUNTER — PATIENT OUTREACH (OUTPATIENT)
Dept: HEMATOLOGY ONCOLOGY | Facility: CLINIC | Age: 63
End: 2023-03-16

## 2023-03-16 DIAGNOSIS — Z17.0 MALIGNANT NEOPLASM OF LOWER-INNER QUADRANT OF RIGHT BREAST OF FEMALE, ESTROGEN RECEPTOR POSITIVE (HCC): Primary | ICD-10-CM

## 2023-03-16 DIAGNOSIS — C50.311 MALIGNANT NEOPLASM OF LOWER-INNER QUADRANT OF RIGHT BREAST OF FEMALE, ESTROGEN RECEPTOR POSITIVE (HCC): Primary | ICD-10-CM

## 2023-03-16 NOTE — PROGRESS NOTES
ONN attempted to call patient and schedule medical oncology appointment  Left message asking for return call

## 2023-03-20 ENCOUNTER — VBI (OUTPATIENT)
Dept: ADMINISTRATIVE | Facility: OTHER | Age: 63
End: 2023-03-20

## 2023-03-23 RX ORDER — IBUPROFEN 200 MG
200-800 TABLET ORAL EVERY 6 HOURS PRN
COMMUNITY

## 2023-03-23 NOTE — PRE-PROCEDURE INSTRUCTIONS
Pre-Surgery Instructions:   Medication Instructions   • acetaminophen (TYLENOL) 650 mg CR tablet Uses PRN- OK to take day of surgery   • buPROPion (Wellbutrin XL) 150 mg 24 hr tablet Take day of surgery  • hydrochlorothiazide (HYDRODIURIL) 12 5 mg tablet Hold day of surgery  • ibuprofen (Advil) 200 mg tablet Stop taking 3 days prior to surgery  • irbesartan (AVAPRO) 300 mg tablet Take night before surgery   • sertraline (ZOLOFT) 50 mg tablet Take day of surgery  Medication instructions for day surgery reviewed  Please use only a sip of water to take your instructed medications  Avoid all over the counter vitamins, supplements and NSAIDS for one week prior to surgery per anesthesia guidelines  Tylenol is ok to take as needed  You will receive a call one business day prior to surgery with an arrival time and hospital directions  If your surgery is scheduled on a Monday, the hospital will be calling you on the Friday prior to your surgery  If you have not heard from anyone by 8pm, please call the hospital supervisor through the hospital  at [phone]  [place] 0--[phone])  Do not eat or drink anything after midnight the night before your surgery, including candy, mints, lifesavers, or chewing gum  Do not drink alcohol 24hrs before your surgery  Try not to smoke at least 24hrs before your surgery  Follow the pre surgery showering instructions as listed in the [facility] Surgical Experience Booklet” or otherwise provided by your surgeon's office  Do not shave the surgical area 24 hours before surgery  Do not apply any lotions, creams, including makeup, cologne, deodorant, or perfumes after showering on the day of your surgery  No contact lenses, eye make-up, or artificial eyelashes  Remove nail polish, including gel polish, and any artificial, gel, or acrylic nails if possible  Remove all jewelry including rings and body piercing jewelry       Wear causal clothing that is easy to take on and off  Consider your type of surgery  Keep any valuables, jewelry, piercings at home  Please bring any specially ordered equipment (sling, braces) if indicated  Arrange for a responsible person to drive you to and from the hospital on the day of your surgery  Visitor Guidelines discussed  Call the surgeon's office with any new illnesses, exposures, or additional questions prior to surgery  Please reference your [facility] Surgical Experience Booklet” for additional information to prepare for your upcoming surgery

## 2023-03-25 ENCOUNTER — ANESTHESIA EVENT (OUTPATIENT)
Dept: PERIOP | Facility: HOSPITAL | Age: 63
End: 2023-03-25

## 2023-03-28 ENCOUNTER — HOSPITAL ENCOUNTER (OUTPATIENT)
Facility: HOSPITAL | Age: 63
Setting detail: OUTPATIENT SURGERY
Discharge: HOME | End: 2023-03-28
Attending: SURGERY | Admitting: SURGERY
Payer: COMMERCIAL

## 2023-03-28 ENCOUNTER — HOSPITAL ENCOUNTER (OUTPATIENT)
Dept: NUCLEAR MEDICINE | Facility: HOSPITAL | Age: 63
Discharge: HOME | End: 2023-03-28
Attending: SURGERY

## 2023-03-28 ENCOUNTER — HOSPITAL ENCOUNTER (OUTPATIENT)
Dept: MAMMOGRAPHY | Facility: HOSPITAL | Age: 63
Discharge: HOME | End: 2023-03-28

## 2023-03-28 ENCOUNTER — ANESTHESIA (OUTPATIENT)
Dept: PERIOP | Facility: HOSPITAL | Age: 63
End: 2023-03-28

## 2023-03-28 VITALS
BODY MASS INDEX: 38.64 KG/M2 | RESPIRATION RATE: 16 BRPM | HEIGHT: 62 IN | TEMPERATURE: 97.7 F | HEART RATE: 68 BPM | OXYGEN SATURATION: 98 % | DIASTOLIC BLOOD PRESSURE: 70 MMHG | SYSTOLIC BLOOD PRESSURE: 121 MMHG | WEIGHT: 210 LBS

## 2023-03-28 DIAGNOSIS — C50.311 MALIGNANT NEOPLASM OF LOWER-INNER QUADRANT OF RIGHT BREAST OF FEMALE, ESTROGEN RECEPTOR POSITIVE (HCC): ICD-10-CM

## 2023-03-28 DIAGNOSIS — Z17.0 MALIGNANT NEOPLASM OF LOWER-INNER QUADRANT OF RIGHT BREAST OF FEMALE, ESTROGEN RECEPTOR POSITIVE (HCC): ICD-10-CM

## 2023-03-28 PROBLEM — E66.9 OBESITY (BMI 35.0-39.9 WITHOUT COMORBIDITY): Status: ACTIVE | Noted: 2022-12-23

## 2023-03-28 PROBLEM — N18.2 CKD (CHRONIC KIDNEY DISEASE), STAGE II: Status: ACTIVE | Noted: 2023-03-28

## 2023-03-28 PROCEDURE — 19301 PARTIAL MASTECTOMY: CPT | Performed by: SURGERY

## 2023-03-28 PROCEDURE — 88307 TISSUE EXAM BY PATHOLOGIST: CPT | Performed by: PATHOLOGY

## 2023-03-28 PROCEDURE — 38900 IO MAP OF SENT LYMPH NODE: CPT | Performed by: SURGERY

## 2023-03-28 PROCEDURE — NC001 PR NO CHARGE: Performed by: SURGERY

## 2023-03-28 PROCEDURE — 38525 BIOPSY/REMOVAL LYMPH NODES: CPT | Performed by: SURGERY

## 2023-03-28 PROCEDURE — 76098 X-RAY EXAM SURGICAL SPECIMEN: CPT | Performed by: SURGERY

## 2023-03-28 PROCEDURE — 88342 IMHCHEM/IMCYTCHM 1ST ANTB: CPT | Performed by: PATHOLOGY

## 2023-03-28 RX ORDER — ONDANSETRON 2 MG/ML
4 INJECTION INTRAMUSCULAR; INTRAVENOUS ONCE AS NEEDED
Status: DISCONTINUED | OUTPATIENT
Start: 2023-03-28 | End: 2023-03-28 | Stop reason: HOSPADM

## 2023-03-28 RX ORDER — CEFAZOLIN SODIUM 2 G/50ML
SOLUTION INTRAVENOUS AS NEEDED
Status: DISCONTINUED | OUTPATIENT
Start: 2023-03-28 | End: 2023-03-28

## 2023-03-28 RX ORDER — ONDANSETRON 2 MG/ML
INJECTION INTRAMUSCULAR; INTRAVENOUS AS NEEDED
Status: DISCONTINUED | OUTPATIENT
Start: 2023-03-28 | End: 2023-03-28

## 2023-03-28 RX ORDER — ACETAMINOPHEN 325 MG/1
650 TABLET ORAL EVERY 6 HOURS PRN
Status: DISCONTINUED | OUTPATIENT
Start: 2023-03-28 | End: 2023-03-28 | Stop reason: HOSPADM

## 2023-03-28 RX ORDER — DEXAMETHASONE SODIUM PHOSPHATE 10 MG/ML
INJECTION, SOLUTION INTRAMUSCULAR; INTRAVENOUS AS NEEDED
Status: DISCONTINUED | OUTPATIENT
Start: 2023-03-28 | End: 2023-03-28

## 2023-03-28 RX ORDER — PROPOFOL 10 MG/ML
INJECTION, EMULSION INTRAVENOUS AS NEEDED
Status: DISCONTINUED | OUTPATIENT
Start: 2023-03-28 | End: 2023-03-28

## 2023-03-28 RX ORDER — MEPERIDINE HYDROCHLORIDE 25 MG/ML
12.5 INJECTION INTRAMUSCULAR; INTRAVENOUS; SUBCUTANEOUS
Status: DISCONTINUED | OUTPATIENT
Start: 2023-03-28 | End: 2023-03-28 | Stop reason: HOSPADM

## 2023-03-28 RX ORDER — BUPIVACAINE HYDROCHLORIDE 5 MG/ML
INJECTION, SOLUTION PERINEURAL AS NEEDED
Status: DISCONTINUED | OUTPATIENT
Start: 2023-03-28 | End: 2023-03-28 | Stop reason: HOSPADM

## 2023-03-28 RX ORDER — LIDOCAINE HYDROCHLORIDE 20 MG/ML
INJECTION, SOLUTION EPIDURAL; INFILTRATION; INTRACAUDAL; PERINEURAL AS NEEDED
Status: DISCONTINUED | OUTPATIENT
Start: 2023-03-28 | End: 2023-03-28

## 2023-03-28 RX ORDER — CEFAZOLIN SODIUM 2 G/50ML
2000 SOLUTION INTRAVENOUS
Status: DISCONTINUED | OUTPATIENT
Start: 2023-03-28 | End: 2023-03-28 | Stop reason: HOSPADM

## 2023-03-28 RX ORDER — FENTANYL CITRATE/PF 50 MCG/ML
25 SYRINGE (ML) INJECTION
Status: DISCONTINUED | OUTPATIENT
Start: 2023-03-28 | End: 2023-03-28 | Stop reason: HOSPADM

## 2023-03-28 RX ORDER — MIDAZOLAM HYDROCHLORIDE 2 MG/2ML
INJECTION, SOLUTION INTRAMUSCULAR; INTRAVENOUS AS NEEDED
Status: DISCONTINUED | OUTPATIENT
Start: 2023-03-28 | End: 2023-03-28

## 2023-03-28 RX ORDER — MAGNESIUM HYDROXIDE 1200 MG/15ML
LIQUID ORAL AS NEEDED
Status: DISCONTINUED | OUTPATIENT
Start: 2023-03-28 | End: 2023-03-28 | Stop reason: HOSPADM

## 2023-03-28 RX ORDER — SODIUM CHLORIDE, SODIUM LACTATE, POTASSIUM CHLORIDE, CALCIUM CHLORIDE 600; 310; 30; 20 MG/100ML; MG/100ML; MG/100ML; MG/100ML
125 INJECTION, SOLUTION INTRAVENOUS CONTINUOUS
Status: DISCONTINUED | OUTPATIENT
Start: 2023-03-28 | End: 2023-03-28 | Stop reason: HOSPADM

## 2023-03-28 RX ORDER — KETOROLAC TROMETHAMINE 30 MG/ML
INJECTION, SOLUTION INTRAMUSCULAR; INTRAVENOUS AS NEEDED
Status: DISCONTINUED | OUTPATIENT
Start: 2023-03-28 | End: 2023-03-28

## 2023-03-28 RX ORDER — TRAMADOL HYDROCHLORIDE 50 MG/1
50 TABLET ORAL EVERY 6 HOURS PRN
Status: DISCONTINUED | OUTPATIENT
Start: 2023-03-28 | End: 2023-03-28 | Stop reason: HOSPADM

## 2023-03-28 RX ORDER — ISOSULFAN BLUE 50 MG/5ML
INJECTION, SOLUTION SUBCUTANEOUS AS NEEDED
Status: DISCONTINUED | OUTPATIENT
Start: 2023-03-28 | End: 2023-03-28 | Stop reason: HOSPADM

## 2023-03-28 RX ORDER — HYDROMORPHONE HCL/PF 1 MG/ML
0.5 SYRINGE (ML) INJECTION
Status: DISCONTINUED | OUTPATIENT
Start: 2023-03-28 | End: 2023-03-28 | Stop reason: HOSPADM

## 2023-03-28 RX ORDER — FENTANYL CITRATE 50 UG/ML
INJECTION, SOLUTION INTRAMUSCULAR; INTRAVENOUS AS NEEDED
Status: DISCONTINUED | OUTPATIENT
Start: 2023-03-28 | End: 2023-03-28

## 2023-03-28 RX ADMIN — PROPOFOL 160 MG: 10 INJECTION, EMULSION INTRAVENOUS at 15:35

## 2023-03-28 RX ADMIN — FENTANYL CITRATE 25 MCG: 50 INJECTION, SOLUTION INTRAMUSCULAR; INTRAVENOUS at 17:57

## 2023-03-28 RX ADMIN — KETOROLAC TROMETHAMINE 15 MG: 30 INJECTION, SOLUTION INTRAMUSCULAR; INTRAVENOUS at 17:13

## 2023-03-28 RX ADMIN — LIDOCAINE HYDROCHLORIDE 100 MG: 20 INJECTION, SOLUTION EPIDURAL; INFILTRATION; INTRACAUDAL; PERINEURAL at 15:35

## 2023-03-28 RX ADMIN — FENTANYL CITRATE 100 MCG: 50 INJECTION INTRAMUSCULAR; INTRAVENOUS at 15:45

## 2023-03-28 RX ADMIN — FENTANYL CITRATE 50 MCG: 50 INJECTION INTRAMUSCULAR; INTRAVENOUS at 16:33

## 2023-03-28 RX ADMIN — FENTANYL CITRATE 25 MCG: 50 INJECTION, SOLUTION INTRAMUSCULAR; INTRAVENOUS at 18:05

## 2023-03-28 RX ADMIN — FENTANYL CITRATE 25 MCG: 50 INJECTION, SOLUTION INTRAMUSCULAR; INTRAVENOUS at 18:11

## 2023-03-28 RX ADMIN — CEFAZOLIN SODIUM 2000 MG: 2 SOLUTION INTRAVENOUS at 15:40

## 2023-03-28 RX ADMIN — MIDAZOLAM 2 MG: 1 INJECTION INTRAMUSCULAR; INTRAVENOUS at 15:23

## 2023-03-28 RX ADMIN — SODIUM CHLORIDE, SODIUM LACTATE, POTASSIUM CHLORIDE, AND CALCIUM CHLORIDE 125 ML/HR: .6; .31; .03; .02 INJECTION, SOLUTION INTRAVENOUS at 11:16

## 2023-03-28 RX ADMIN — ONDANSETRON 4 MG: 2 INJECTION INTRAMUSCULAR; INTRAVENOUS at 15:35

## 2023-03-28 RX ADMIN — DEXAMETHASONE SODIUM PHOSPHATE 10 MG: 10 INJECTION INTRAMUSCULAR; INTRAVENOUS at 15:35

## 2023-03-28 RX ADMIN — SODIUM CHLORIDE, SODIUM LACTATE, POTASSIUM CHLORIDE, AND CALCIUM CHLORIDE: .6; .31; .03; .02 INJECTION, SOLUTION INTRAVENOUS at 17:13

## 2023-03-28 NOTE — ANESTHESIA PREPROCEDURE EVALUATION
Procedure:  LUMPECTOMY BREAST MARY  LOCALIZED (Right: Breast)  BIOPSY LYMPH NODE, SENTINEL LYMPHOSCINTIGRAPHY, LYMPHATIC MAPPING (Right: Axilla)    Relevant Problems   CARDIO   (+) Essential hypertension   (+) Mixed hyperlipidemia      /RENAL   (+) CKD (chronic kidney disease), stage II      GYN   (+) Malignant neoplasm of lower-inner quadrant of right breast of female, estrogen receptor positive (HCC)      NEURO/PSYCH   (+) Anxiety   (+) Chronic right shoulder pain   (+) Major depressive disorder      PULMONARY   (+) Light cigarette smoker (1-9 cigarettes per day)      Other   (+) Obesity (BMI 35 0-39 9 without comorbidity)   (+) Tobacco abuse        Physical Exam    Airway    Mallampati score: III  TM Distance: >3 FB  Neck ROM: full     Dental   upper dentures,     Cardiovascular  Rhythm: regular, Rate: normal, Cardiovascular exam normal    Pulmonary  Pulmonary exam normal Breath sounds clear to auscultation,     Other Findings        Anesthesia Plan  ASA Score- 3     Anesthesia Type- general with ASA Monitors  Additional Monitors:   Airway Plan:     Comment: Had coffee w/ cream at 0730 today  GFR=61    Plan Factors-    Existing labs reviewed  Patient summary reviewed  Patient is a current smoker  Patient instructed to abstain from smoking on day of procedure  Patient did not smoke on day of surgery  There is medical exclusion for perioperative obstructive sleep apnea risk education  Induction- intravenous  Postoperative Plan-     Informed Consent- Anesthetic plan and risks discussed with patient and daughter Afshan Chema

## 2023-03-28 NOTE — H&P
H&P Exam - Surgical Oncology   Chasity Kim 59 y o  female MRN: [id number]  Unit/Bed#: OR POOL Encounter: [id number]    Assessment/Plan     Assessment:  Right breast carcinoma  Plan:  Right breast lumpectomy and sentinel node biopsy    History of Present Illness   HX and PE limited by: n/a  HPI:  Chasity Kim is a 59 y o  female who presents with breast carcinoma, here today for definitive surgical management       Review of Systems   Constitutional: Negative  Negative for appetite change and fever  Eyes: Negative  Respiratory: Negative for shortness of breath  Cardiovascular: Negative  Gastrointestinal: Negative  Endocrine: Negative  Genitourinary: Negative  Musculoskeletal: Negative  Negative for arthralgias and myalgias  Skin: Negative  Allergic/Immunologic: Negative  Neurological: Negative  Hematological: Negative  Negative for adenopathy  Does not bruise/bleed easily  Psychiatric/Behavioral: Negative  Historical Information   Past Medical History:   Diagnosis Date   • Anxiety    • Arthritis     right foot/ back / hands   • Breast cancer ([address] )     right   • Cellulitis 09/13/2019   • COVID     x 2--3/7/22 and approx Sept 2022   recovered @ home   • Heartburn    • Hypertension    • Hypokalemia 04/01/2020   • Pneumonia of both lungs due to infectious organism 08/19/2019    pt denies   • Tobacco abuse    • Wears dentures     full uppers   • Wears glasses      Past Surgical History:   Procedure Laterality Date   • APPENDECTOMY      APPENDISITIS SURGERY AGE 19   • BREAST BIOPSY Right 01/31/2023   • US GUIDED BREAST BIOPSY RIGHT COMPLETE Right 01/31/2023     Social History   Social History     Substance and Sexual Activity   Alcohol Use Not Currently   • Alcohol/week: 7 0 standard drinks   • Types: 7 Glasses of wine per week    Comment: quit 2 years ago Oct 2020     Social History     Substance and Sexual Activity   Drug Use Not Currently     Social History     Tobacco "Use   Smoking Status Every Day   • Packs/day: 0 50   • Years: 45 00   • Pack years: 22 50   • Types: Cigarettes   Smokeless Tobacco Never   Tobacco Comments    6 CIGARETTES PER DAY - NO PASSIVE SMOKE EXPOSURE      E-Cigarette/Vaping   • E-Cigarette Use Never User      E-Cigarette/Vaping Substances   • Nicotine No    • THC No    • CBD No    • Flavoring No    • Other No    • Unknown No      Family History   Problem Relation Age of Onset   • COPD Mother    • No Known Problems Father    • No Known Problems Brother    • No Known Problems Brother    • No Known Problems Brother    • No Known Problems Daughter    • No Known Problems Maternal Aunt    • Lung cancer Maternal Uncle         age at dx unk   • Ovarian cancer Maternal Grandmother         age at dx unk   • Prostate cancer Maternal Grandfather         age at dx unk   • No Known Problems Paternal Grandmother    • No Known Problems Paternal Grandfather    • Breast cancer Neg Hx    • Colon cancer Neg Hx        Meds/Allergies   all current active meds have been reviewed  Allergies   Allergen Reactions   • Amlodipine Edema   • Latex      Added based on information entered during log entry, please review and add reactions, type, and severity as needed   • Lisinopril Cough   • Pollen Extract        Objective   Vitals: Blood pressure 164/81, pulse 68, temperature 98 5 °F (36 9 °C), temperature source Tympanic, resp  rate 16, height 5' 2\" (1 575 m), weight 95 3 kg (210 lb), SpO2 95 %  No intake or output data in the 24 hours ending 03/28/23 1407    Invasive Devices     Peripheral Intravenous Line  Duration           Peripheral IV 03/28/23 Dorsal (posterior); Left Hand <1 day                Physical Exam  Constitutional:       General: She is not in acute distress  Appearance: Normal appearance  HENT:      Head: Normocephalic and atraumatic  Cardiovascular:      Heart sounds: Normal heart sounds  Pulmonary:      Breath sounds: Normal breath sounds     Abdominal:     " Palpations: Abdomen is soft  Musculoskeletal:      Right lower leg: No edema  Left lower leg: No edema  Neurological:      Mental Status: She is alert and oriented to person, place, and time  Psychiatric:         Mood and Affect: Mood normal          Lab Results: I have personally reviewed pertinent lab results  Imaging: I have personally reviewed pertinent reports      Pathology, and Other Studies: As noted    Code Status: No Order  Advance Directive and Living Will:      Power of :    POLST:

## 2023-03-28 NOTE — OP NOTE
OPERATIVE REPORT  PATIENT NAME: Arianna Jones    :  1960  MRN: [id number]  Pt Location: [facility] OR ROOM 05    SURGERY DATE: 3/28/2023    Surgeon(s) and Role:     * Joanna Aranda MD - Primary     * Biju Shultz MD - Assisting    Preop Diagnosis:  Malignant neoplasm of lower-inner quadrant of right breast of female, estrogen receptor positive ([address] ) [C50 311, Z17 0]    Post-Op Diagnosis Codes:     * Malignant neoplasm of lower-inner quadrant of right breast of female, estrogen receptor positive ([facility] [address] [facility] ) [C50 311, Z17 0]    Procedure(s):  Right - LUMPECTOMY BREAST WOOD  LOCALIZED  Right - BIOPSY LYMPH NODE  SENTINEL LYMPHOSCINTIGRAPHY  LYMPHATIC MAPPING  Injection of blue dye  Use of wood    Specimen radiograph    Specimen(s):  ID Type Source Tests Collected by Time Destination   1 : Right Breast Lumpectomy Short Superior Long Lateral Tissue Breast, Right TISSUE EXAM Joanna Aranda MD 3/28/2023 1555    2 : New Right Breast Medial/Superior Margin  Tissue Breast, Right TISSUE EXAM Joanna Aranda MD 3/28/2023 1555    3 : [place] Node #1  Tissue Lymph Node, [place] TISSUE EXAM Joanna Aranda MD 3/28/2023 1555    4 : [place] Node #2 Tissue Lymph Node, [place] TISSUE EXAM Joanna Aranda MD 3/28/2023 1648    5 : [place] Node #3 Tissue Lymph Node, [place] TISSUE EXAM Joanna Aranda MD 3/28/2023 1648    6 : [place] Node #4 Tissue Lymph Node, [place] TISSUE EXAM Joanna Aranda MD 3/28/2023 1648        Estimated Blood Loss:   Minimal    Drains:  * No LDAs found *    Anesthesia Type:   General    Operative Indications:  Malignant neoplasm of lower-inner quadrant of right breast of female, estrogen receptor positive ([facility] ) [C50 311, Z17 0]      Operative Findings:  Wood reflector in specimen, nonfunctioning gamma probe    Complications:   None    Procedure and Technique:  Yasmeen Graham is a 21-year-old female who presented with right breast carcinoma  She was counseled on right lumpectomy and sentinel node biopsy    She presented the day of surgery to the radiology suite and underwent lymphoscintigraphy of the right breast   From there she went to the operating room  She had preoperative antibiotics  She was administered general anesthesia  She had a 5 cc injection of Lymphazurin into the right subareolar plexus  She was prepped and draped in the usual standard fashion  Timeout was performed  Attention was turned to the medial aspect of the right breast   The savvy probe was used to identify the reflector in the 3:00 axis  The skin was marked in this location  Half percent Marcaine plain was injected for local anesthesia  An elliptical incision was created through the skin and subcutaneous tissue  Electrocautery was used to dissect margins superior, medial, inferior, lateral and posterior  The posterior extent was taken down to the pectoralis  Hemoclips were placed on a  medial   The savvy probe was used throughout to help guide dissection  The specimen was marked with a short stitch superior and a long stitch lateral   The specimen was interrogated to confirm reflector removal   The specimen was also imaged in the operating room revealing the residual density and Nikole reflector  The closest margin was medial in the superior aspect  Therefore new margin was excised in this location and a suture was placed on the true margin  Breast specimens were submitted to pathology in formalin  Attention was then turned to the right axilla  There were technical difficulties with the gamma probe and a biomed was not available to evaluate this  She was however marked by radiology preoperatively  The area of skin marking was in the lower axillary hairline  Therefore additional half percent Marcaine plain was injected in this location  An incision was created through the skin and subcutaneous tissue    Electrocautery was used to dissect through the remaining subcutaneous tissue and clavipectoral fascia to enter the axillary fat pad  There was a chain of blue stained lymph nodes that were elevated into the wound  Clips were placed on several draining vessels  This lymph node chain was excised completely  The nodes were divided and submitted separately as sentinel node 1, 2, 3 and 4 of the right axilla  Following removal of this lymph node chain, there were no suspicious palpable nodes present  Both of her wounds were irrigated and hemostasis was achieved  Hemoclips were placed within the lumpectomy cavity  Surgicel powder was also used in the lumpectomy cavity for additional hemostasis  The wounds were then closed with a 3-0 Vicryl in the deep tissue in the breast as well as multiple interrupted 3-0 Monocryl suture and running 4-0 Monocryl subcuticular stitch at both the breast and axillary incisions  All counts were correct  The skin was cleaned and dried  Surgical glue, fluffs and a bra were applied  The patient was then extubated and taken to recovery in stable condition  Patient Disposition:  extubated and stable    [place] Node Biopsy for Breast Cancer - Right  Operation performed with curative intent  Yes   Tracer(s) used to identify sentinel nodes in the upfront surgery (non-neoadjuvant) setting (select all that apply)  Dye and Radioactive tracer   Tracer(s) used to identify sentinel nodes in the neoadjuvant setting (select all that apply)  N/A   All nodes (colored or non-colored) present at the end of a dye-filled lymphatic channel were removed  Yes   All significantly radioactive nodes were removed  N/A technical difficulty with the gamma probe   All palpably suspicious nodes were removed  N/A   Biopsy-proven positive nodes marked with clips prior to chemotherapy were identified and removed   N/A            SIGNATURE: Jagdish Edwards MD  DATE: March 28, 2023  TIME: 5:29 PM

## 2023-03-28 NOTE — PROGRESS NOTES
Pt reports drinking quarter cup of coffee with cream and sugar @ 0730 Dr Valery Murillo updated via tt and will update Dr Juan M Sarmiento  OR time changed to 1537

## 2023-03-28 NOTE — DISCHARGE INSTR - AVS FIRST PAGE
POST-OPERATIVE CARE INSTRUCTIONS       Care after your procedure:   General  Rest and relax for 24 hours, then gradually return to normal activities  Do not preform any heavy lifting or strenuous physical activities for 14 days  Your activity restrictions will be re-evaluated at your post op visit  Drink clear liquids until you are certain there is no nausea, then resume a normal diet  Do not drink alcohol, drive any vehicle, operate mechanical equipment or make critical decisions for at least 24 hours and until you are off any narcotic pain medications  The Incision  Your incision is closed with:   dissolvable stiches just underneath the skin  The incision is also covered with:                          clear waterproof glue  A gauze-pad is covering the wound  Wound care  Remove your gauze-pad after 24 hours  You may then shower using soap and water to clean your incision  Gently dry the wound  You may redress your wound with additional gauze and tape if you choose  A little bruising at the wound site is normal     Medication  Resume all previous medications  Take either Naproxen (Aleve) one tablet every 8 hours or Ibuprofen(Advil/Motrin) one(1) to two(2) tablets every 6 hours as needed  Pain Medication Instructions:may also use over the counter tylenol or prescription tramadol if needed          Other (If applicable)  Wear a post-surgical bra around the clock  May use ice to the incision site(s) for the next 24-48 hours, twice daily     Call your  doctor if you have any of the following:  Redness, swelling, heat, drainage, and/or bleeding from your wound  Chills or fever ( above 101' F )  Pain, not relieved with the above medications  If you have any questions or problems call our office [phone]    Follow-up appointment:  As scheduled

## 2023-03-29 ENCOUNTER — PATIENT OUTREACH (OUTPATIENT)
Dept: HEMATOLOGY ONCOLOGY | Facility: CLINIC | Age: 63
End: 2023-03-29

## 2023-03-29 NOTE — PROGRESS NOTES
Breast Nurse Navigator     Called patient to offer support and assess healing process  Pt reports feeling well overall   Pain is reported as 3/10 and only using ice and a pain pill to sleep  Patient is wearing bra appropriately  Reviewed upcoming appointments:  Stephanie Muñiz op 4/17/23  Medical Oncology 4/17/23  Radiation Oncology TBD    Oncology Care Coordination will follow up with patient after consults and post op  Pt has direct contact info and encouraged to reach out with any questions or concerns

## 2023-03-31 ENCOUNTER — TELEPHONE (OUTPATIENT)
Dept: OBGYN CLINIC | Facility: OTHER | Age: 63
End: 2023-03-31

## 2023-03-31 DIAGNOSIS — S21.001A WOUND OF RIGHT BREAST, INITIAL ENCOUNTER: Primary | ICD-10-CM

## 2023-03-31 RX ORDER — CEPHALEXIN 500 MG/1
500 CAPSULE ORAL EVERY 6 HOURS SCHEDULED
Qty: 28 CAPSULE | Refills: 0 | Status: SHIPPED | OUTPATIENT
Start: 2023-03-31 | End: 2023-04-07

## 2023-03-31 NOTE — TELEPHONE ENCOUNTER
Call Transfer   Reason for patient call? Pt called because she her incision is draining  If someone other than patient is calling, are they listed on the communication consent? N/A   Who is the patients Primary Oncologist? Dr Mushtaq Lowe    Person/Department that the call was transferred to? Time that call was transferred? Colleen Munoz    Informed patient that the message will be forwarded to the team and someone will get back to them as soon as possible  Did you relay this information to the patient?   yes

## 2023-03-31 NOTE — TELEPHONE ENCOUNTER
Received a call from Yasmeen Graham with concerns of drainage from her incision line  She states it is brown in color and a slow drip  She has some redness around the incision  She sees a small opening in the incision line  She denies fevers  Dr Marline Albert is in the OR  Spoke with PURVI Aguilar and reviewed patient's concerns  She ordered antibiotics for Yasmeen Graham and offered her an appt on Monday  Yasmeen Graham declined appt on Monday as she stated she had no transportation  Offered he an appt on Tuesday with Dr Marline Albert which she agreed to  Instructed her to do daily shower  Then apply triple antibiotic ointment to site with a dry sterile dressing  She verbalized understanding  Dr Marline Albert notified via [facility] message

## 2023-04-03 ENCOUNTER — TELEPHONE (OUTPATIENT)
Dept: HEMATOLOGY ONCOLOGY | Facility: CLINIC | Age: 63
End: 2023-04-03

## 2023-04-03 NOTE — TELEPHONE ENCOUNTER
Appointment Cancellation or Reschedule     Person calling in Patient   If someone other than patient calling, are they listed on the communication consent form? N/A   Provider Dr Ganesh Robb   Office Visit Date and Time  04/04/2023 @12:45PM    Office Visit Location [place]   Did patient want to reschedule their visit? If so, when was it scheduled to? no, patient no longer needs this appointment  Did the patient have STAR scheduled for this appointment? No   Does the patient need STAR scheduled for their new appointment? No   Is this patient calling to reschedule an infusion appointment? No   When is their next infusion appointment? N/A   Is this patient a Chemo patient? No   Reason for Cancellation or Reschedule Patient is no longer experiencing the symptoms she had when she wanted to be seen by Dr Ganesh Robb  Was the No show policy reviewed with patient if patient is canceling within 24 hours? No     If the patient is cancelling an appointment and needs their STAR Transport cancelled, please route to [address]  If the patient is a treatment patient, please route this to the office nurse  If the patient is not on treatment, please route to the Clerical pool based on location  If the patient is a surgical oncology patient, please route to surg/onc clinical pool  Route message as high priority if same day cancellation

## 2023-04-03 NOTE — TELEPHONE ENCOUNTER
Received a message that Gio Aleman had cancelled her post op appt on Tuesday  Called her to assess her previous post op concerns  She shared the drainage from her incision has stopped on Saturday  She denies fever, the redness has improved since she has been on the antibiotics  She plans to complete the course of antibiotics and has a post op follow up appt scheduled with Dr Reg Woodall

## 2023-04-05 PROCEDURE — 88307 TISSUE EXAM BY PATHOLOGIST: CPT | Performed by: PATHOLOGY

## 2023-04-05 PROCEDURE — 88342 IMHCHEM/IMCYTCHM 1ST ANTB: CPT | Performed by: PATHOLOGY

## 2023-04-17 PROBLEM — D70.1 CHEMOTHERAPY INDUCED NEUTROPENIA (HCC): Status: ACTIVE | Noted: 2023-04-17

## 2023-04-17 PROBLEM — T45.1X5A CHEMOTHERAPY INDUCED NEUTROPENIA (HCC): Status: ACTIVE | Noted: 2023-04-17

## 2023-04-24 ENCOUNTER — TELEPHONE (OUTPATIENT)
Dept: HEMATOLOGY ONCOLOGY | Facility: CLINIC | Age: 63
End: 2023-04-24

## 2023-04-24 DIAGNOSIS — Z17.0 MALIGNANT NEOPLASM OF LOWER-INNER QUADRANT OF RIGHT BREAST OF FEMALE, ESTROGEN RECEPTOR POSITIVE (HCC): ICD-10-CM

## 2023-04-24 DIAGNOSIS — T45.1X5A CHEMOTHERAPY INDUCED NEUTROPENIA (HCC): Primary | ICD-10-CM

## 2023-04-24 DIAGNOSIS — D70.1 CHEMOTHERAPY INDUCED NEUTROPENIA (HCC): Primary | ICD-10-CM

## 2023-04-24 DIAGNOSIS — C50.311 MALIGNANT NEOPLASM OF LOWER-INNER QUADRANT OF RIGHT BREAST OF FEMALE, ESTROGEN RECEPTOR POSITIVE (HCC): ICD-10-CM

## 2023-04-24 NOTE — TELEPHONE ENCOUNTER
Please reschedule neulasta injections after 3PM due to patient working from 7AM-3PM          Thank you!

## 2023-04-24 NOTE — TELEPHONE ENCOUNTER
Called out to patient to confirm Day 2 neulasta injection, per patient will like a call from the nurse to discuss this injection and what its for  Per patient she was unaware of this adjustment  Per patient will like to know if injection can be giving the same day  Please call patient to discuss  Thank you!

## 2023-04-27 ENCOUNTER — APPOINTMENT (OUTPATIENT)
Dept: LAB | Facility: IMAGING CENTER | Age: 63
End: 2023-04-27

## 2023-04-27 DIAGNOSIS — D70.1 CHEMOTHERAPY INDUCED NEUTROPENIA (HCC): ICD-10-CM

## 2023-04-27 DIAGNOSIS — C50.311 MALIGNANT NEOPLASM OF LOWER-INNER QUADRANT OF RIGHT BREAST OF FEMALE, ESTROGEN RECEPTOR POSITIVE (HCC): ICD-10-CM

## 2023-04-27 DIAGNOSIS — Z17.0 MALIGNANT NEOPLASM OF LOWER-INNER QUADRANT OF RIGHT BREAST OF FEMALE, ESTROGEN RECEPTOR POSITIVE (HCC): ICD-10-CM

## 2023-04-27 DIAGNOSIS — T45.1X5A CHEMOTHERAPY INDUCED NEUTROPENIA (HCC): ICD-10-CM

## 2023-04-27 LAB
ALBUMIN SERPL BCP-MCNC: 4 G/DL (ref 3.5–5)
ALP SERPL-CCNC: 115 U/L (ref 46–116)
ALT SERPL W P-5'-P-CCNC: 27 U/L (ref 12–78)
ANION GAP SERPL CALCULATED.3IONS-SCNC: 4 MMOL/L (ref 4–13)
AST SERPL W P-5'-P-CCNC: 19 U/L (ref 5–45)
BASOPHILS # BLD AUTO: 0.05 THOUSANDS/ΜL (ref 0–0.1)
BASOPHILS NFR BLD AUTO: 1 % (ref 0–1)
BILIRUB SERPL-MCNC: 0.26 MG/DL (ref 0.2–1)
BUN SERPL-MCNC: 16 MG/DL (ref 5–25)
CALCIUM SERPL-MCNC: 9.7 MG/DL (ref 8.3–10.1)
CHLORIDE SERPL-SCNC: 104 MMOL/L (ref 96–108)
CO2 SERPL-SCNC: 26 MMOL/L (ref 21–32)
CREAT SERPL-MCNC: 1.06 MG/DL (ref 0.6–1.3)
EOSINOPHIL # BLD AUTO: 0.26 THOUSAND/ΜL (ref 0–0.61)
EOSINOPHIL NFR BLD AUTO: 4 % (ref 0–6)
ERYTHROCYTE [DISTWIDTH] IN BLOOD BY AUTOMATED COUNT: 13.7 % (ref 11.6–15.1)
GFR SERPL CREATININE-BSD FRML MDRD: 56 ML/MIN/1.73SQ M
GLUCOSE P FAST SERPL-MCNC: 99 MG/DL (ref 65–99)
HCT VFR BLD AUTO: 38.2 % (ref 34.8–46.1)
HGB BLD-MCNC: 12.2 G/DL (ref 11.5–15.4)
IMM GRANULOCYTES # BLD AUTO: 0.02 THOUSAND/UL (ref 0–0.2)
IMM GRANULOCYTES NFR BLD AUTO: 0 % (ref 0–2)
LYMPHOCYTES # BLD AUTO: 1.55 THOUSANDS/ΜL (ref 0.6–4.47)
LYMPHOCYTES NFR BLD AUTO: 26 % (ref 14–44)
MCH RBC QN AUTO: 29.2 PG (ref 26.8–34.3)
MCHC RBC AUTO-ENTMCNC: 31.9 G/DL (ref 31.4–37.4)
MCV RBC AUTO: 91 FL (ref 82–98)
MONOCYTES # BLD AUTO: 0.57 THOUSAND/ΜL (ref 0.17–1.22)
MONOCYTES NFR BLD AUTO: 9 % (ref 4–12)
NEUTROPHILS # BLD AUTO: 3.63 THOUSANDS/ΜL (ref 1.85–7.62)
NEUTS SEG NFR BLD AUTO: 60 % (ref 43–75)
NRBC BLD AUTO-RTO: 0 /100 WBCS
PLATELET # BLD AUTO: 281 THOUSANDS/UL (ref 149–390)
PMV BLD AUTO: 9.3 FL (ref 8.9–12.7)
POTASSIUM SERPL-SCNC: 4.8 MMOL/L (ref 3.5–5.3)
PROT SERPL-MCNC: 7.9 G/DL (ref 6.4–8.4)
RBC # BLD AUTO: 4.18 MILLION/UL (ref 3.81–5.12)
SODIUM SERPL-SCNC: 134 MMOL/L (ref 135–147)
WBC # BLD AUTO: 6.08 THOUSAND/UL (ref 4.31–10.16)

## 2023-04-28 ENCOUNTER — TELEPHONE (OUTPATIENT)
Dept: INFUSION CENTER | Facility: CLINIC | Age: 63
End: 2023-04-28

## 2023-04-28 NOTE — TELEPHONE ENCOUNTER
"Telephone call placed to remind patient of her appointment on Monday 5/1 at [phone] for first cycle chemotherapy  Visitor and no-show policy reviewed  Explained to patient that we do have light snacks and drinks, but she is welcome to bring additional food items from home  Pt did have concerns about hair loss and \"feeling sick\" after receiving chemotherapy  Education and emotional support offered re: management of potential side effects  All questions answered to patients satisfaction and pt appreciative of call     "

## 2023-05-01 ENCOUNTER — HOSPITAL ENCOUNTER (OUTPATIENT)
Dept: INFUSION CENTER | Facility: CLINIC | Age: 63
Discharge: HOME | End: 2023-05-01

## 2023-05-01 VITALS
DIASTOLIC BLOOD PRESSURE: 92 MMHG | RESPIRATION RATE: 18 BRPM | TEMPERATURE: 98.6 F | HEART RATE: 93 BPM | SYSTOLIC BLOOD PRESSURE: 163 MMHG | BODY MASS INDEX: 39.24 KG/M2 | WEIGHT: 221.45 LBS | HEIGHT: 63 IN

## 2023-05-01 DIAGNOSIS — D70.1 CHEMOTHERAPY INDUCED NEUTROPENIA (HCC): Primary | ICD-10-CM

## 2023-05-01 DIAGNOSIS — T45.1X5A CHEMOTHERAPY INDUCED NEUTROPENIA (HCC): Primary | ICD-10-CM

## 2023-05-01 DIAGNOSIS — Z17.0 MALIGNANT NEOPLASM OF LOWER-INNER QUADRANT OF RIGHT BREAST OF FEMALE, ESTROGEN RECEPTOR POSITIVE (HCC): ICD-10-CM

## 2023-05-01 DIAGNOSIS — C50.311 MALIGNANT NEOPLASM OF LOWER-INNER QUADRANT OF RIGHT BREAST OF FEMALE, ESTROGEN RECEPTOR POSITIVE (HCC): ICD-10-CM

## 2023-05-01 RX ORDER — SODIUM CHLORIDE 9 MG/ML
20 INJECTION, SOLUTION INTRAVENOUS ONCE
Status: COMPLETED | OUTPATIENT
Start: 2023-05-01 | End: 2023-05-01

## 2023-05-01 RX ADMIN — SODIUM CHLORIDE 20 ML/HR: 9 INJECTION, SOLUTION INTRAVENOUS at 11:56

## 2023-05-01 RX ADMIN — DOCETAXEL ANHYDROUS 146 MG: 10 INJECTION, SOLUTION INTRAVENOUS at 12:37

## 2023-05-01 RX ADMIN — CYCLOPHOSPHAMIDE 1170 MG: 1 INJECTION, POWDER, FOR SOLUTION INTRAVENOUS; ORAL at 13:40

## 2023-05-01 RX ADMIN — DEXAMETHASONE SODIUM PHOSPHATE: 10 INJECTION, SOLUTION INTRAMUSCULAR; INTRAVENOUS at 12:00

## 2023-05-01 NOTE — PROGRESS NOTES
Pt presents for taxotere and cytoxan  No new meds or concerns  Pt tolerated infusion without adverse reaction  Future appointments discussed  AVS reviewed and given

## 2023-05-02 ENCOUNTER — HOSPITAL ENCOUNTER (OUTPATIENT)
Dept: INFUSION CENTER | Facility: CLINIC | Age: 63
Discharge: HOME | End: 2023-05-02

## 2023-05-02 VITALS
DIASTOLIC BLOOD PRESSURE: 79 MMHG | TEMPERATURE: 98.8 F | RESPIRATION RATE: 18 BRPM | SYSTOLIC BLOOD PRESSURE: 176 MMHG | HEART RATE: 103 BPM

## 2023-05-02 DIAGNOSIS — D70.1 CHEMOTHERAPY INDUCED NEUTROPENIA (HCC): Primary | ICD-10-CM

## 2023-05-02 DIAGNOSIS — T45.1X5A CHEMOTHERAPY INDUCED NEUTROPENIA (HCC): Primary | ICD-10-CM

## 2023-05-02 DIAGNOSIS — Z17.0 MALIGNANT NEOPLASM OF LOWER-INNER QUADRANT OF RIGHT BREAST OF FEMALE, ESTROGEN RECEPTOR POSITIVE (HCC): ICD-10-CM

## 2023-05-02 DIAGNOSIS — C50.311 MALIGNANT NEOPLASM OF LOWER-INNER QUADRANT OF RIGHT BREAST OF FEMALE, ESTROGEN RECEPTOR POSITIVE (HCC): ICD-10-CM

## 2023-05-02 RX ADMIN — PEGFILGRASTIM-BMEZ 6 MG: 6 INJECTION SUBCUTANEOUS at 15:38

## 2023-05-02 NOTE — PROGRESS NOTES
Pt presents for xiextenzo  No new meds or concerns  Pt tolerated injection in DOMI without adverse reaction  Pt asked if her R breast lumpectomy incision is infected  Site is pink and tender with some drainage  RN and fellow RN, Prashanth Aguilar instructed pt to call Dr Roberto Miner office ASAP to report symptoms  Pt agreed she will  Dr Willy Baltazar made aware  Future appointments discussed  AVS declined

## 2023-05-03 ENCOUNTER — OFFICE VISIT (OUTPATIENT)
Dept: SURGICAL ONCOLOGY | Facility: CLINIC | Age: 63
End: 2023-05-03

## 2023-05-03 ENCOUNTER — PATIENT OUTREACH (OUTPATIENT)
Dept: HEMATOLOGY ONCOLOGY | Facility: CLINIC | Age: 63
End: 2023-05-03

## 2023-05-03 VITALS
HEART RATE: 98 BPM | WEIGHT: 221 LBS | HEIGHT: 63 IN | RESPIRATION RATE: 16 BRPM | TEMPERATURE: 97 F | SYSTOLIC BLOOD PRESSURE: 140 MMHG | DIASTOLIC BLOOD PRESSURE: 80 MMHG | BODY MASS INDEX: 39.16 KG/M2 | OXYGEN SATURATION: 98 %

## 2023-05-03 DIAGNOSIS — C50.311 MALIGNANT NEOPLASM OF LOWER-INNER QUADRANT OF RIGHT BREAST OF FEMALE, ESTROGEN RECEPTOR POSITIVE (HCC): Primary | ICD-10-CM

## 2023-05-03 DIAGNOSIS — Z51.89 VISIT FOR WOUND CHECK: ICD-10-CM

## 2023-05-03 DIAGNOSIS — Z17.0 MALIGNANT NEOPLASM OF LOWER-INNER QUADRANT OF RIGHT BREAST OF FEMALE, ESTROGEN RECEPTOR POSITIVE (HCC): Primary | ICD-10-CM

## 2023-05-03 NOTE — PROGRESS NOTES
Patient called the nurse navigator today stating that she bent over and noted drainage from the breast incision line  She started chemo 2 days ago  On exam today  She has a small opening in the central portion of the breast incision line  The axillary incision line is healing well  There was however no erythema in the breast and no residual drainage was appreciated  The wound was therefore cleaned  Benzoin and Steri-Strips were applied

## 2023-05-03 NOTE — PROGRESS NOTES
"Breast Cancer Nurse Navigator     Called patient after infusion and having read  nurse note reporting concern for incision site  Pt reports not feeling well today overall \"crappy\" after chemo treatment  Denies bone pain  Attempted to work today, however had to leave and lay down  Patient report incision drained a good amount of clear liquid last evening when patient bent over  Reports incision is not red, no odor, or pain  Dr Shahzad White made aware   Patient to go to office at 3:45 today   "

## 2023-05-04 ENCOUNTER — TELEPHONE (OUTPATIENT)
Dept: HEMATOLOGY ONCOLOGY | Facility: CLINIC | Age: 63
End: 2023-05-04

## 2023-05-04 NOTE — TELEPHONE ENCOUNTER
Received Paperwork   What type of form FMLA   Scanned blank form into patient's Epic chart YES   Method received form PATIENT DROPPED OFF   Provider responsible for form    Informed patient our office turn around time for completing patient forms is 5-7 business days   YES     Comments PLEASE FAX

## 2023-05-12 NOTE — TELEPHONE ENCOUNTER
Paperwork Complete   What type of form  fmla   Method returned to patient MAIL   Communicated to patient forms are completed YES   Scanned completed form(s) into patient's Epic chart YES   Additional Comments:

## 2023-05-15 ENCOUNTER — TELEPHONE (OUTPATIENT)
Dept: SURGICAL ONCOLOGY | Facility: CLINIC | Age: 63
End: 2023-05-15

## 2023-05-15 ENCOUNTER — OFFICE VISIT (OUTPATIENT)
Dept: HEMATOLOGY ONCOLOGY | Facility: CLINIC | Age: 63
End: 2023-05-15

## 2023-05-15 ENCOUNTER — OFFICE VISIT (OUTPATIENT)
Dept: SURGICAL ONCOLOGY | Facility: CLINIC | Age: 63
End: 2023-05-15

## 2023-05-15 VITALS
BODY MASS INDEX: 37.39 KG/M2 | HEART RATE: 105 BPM | HEIGHT: 63 IN | TEMPERATURE: 97.7 F | SYSTOLIC BLOOD PRESSURE: 128 MMHG | OXYGEN SATURATION: 94 % | WEIGHT: 211 LBS | DIASTOLIC BLOOD PRESSURE: 88 MMHG | RESPIRATION RATE: 16 BRPM

## 2023-05-15 VITALS
RESPIRATION RATE: 16 BRPM | HEART RATE: 105 BPM | TEMPERATURE: 97.7 F | SYSTOLIC BLOOD PRESSURE: 128 MMHG | BODY MASS INDEX: 39.16 KG/M2 | DIASTOLIC BLOOD PRESSURE: 88 MMHG | WEIGHT: 221 LBS | HEIGHT: 63 IN | OXYGEN SATURATION: 94 %

## 2023-05-15 DIAGNOSIS — T14.8XXA OPEN WOUND: Primary | ICD-10-CM

## 2023-05-15 DIAGNOSIS — C50.311 MALIGNANT NEOPLASM OF LOWER-INNER QUADRANT OF RIGHT BREAST OF FEMALE, ESTROGEN RECEPTOR POSITIVE (HCC): Primary | ICD-10-CM

## 2023-05-15 DIAGNOSIS — Z51.89 VISIT FOR WOUND CHECK: ICD-10-CM

## 2023-05-15 DIAGNOSIS — Z17.0 MALIGNANT NEOPLASM OF LOWER-INNER QUADRANT OF RIGHT BREAST OF FEMALE, ESTROGEN RECEPTOR POSITIVE (HCC): Primary | ICD-10-CM

## 2023-05-15 RX ORDER — SODIUM CHLORIDE 9 MG/ML
20 INJECTION, SOLUTION INTRAVENOUS ONCE
Status: CANCELLED | OUTPATIENT
Start: 2023-06-12

## 2023-05-15 RX ORDER — ANASTROZOLE 1 MG/1
1 TABLET ORAL DAILY
Qty: 90 TABLET | Refills: 1 | Status: SHIPPED | OUTPATIENT
Start: 2023-05-15

## 2023-05-15 RX ORDER — SODIUM CHLORIDE 9 MG/ML
20 INJECTION, SOLUTION INTRAVENOUS ONCE
Status: CANCELLED | OUTPATIENT
Start: 2023-07-03

## 2023-05-15 NOTE — PROGRESS NOTES
Hematology / Oncology Outpatient Follow Up Note    Maricarmen Browning 59 y o  female :1960 [id number]         Date:  5/15/2023    Assessment / Plan: A 60-year-old postmenopausal woman with stage IIA right breast cancer, grade 3, ER 90% positive, CA 20% positive, HER2 1+ by IHC  Ki-67 was 50 to 55%  She underwent lumpectomy and sentinel lymph node biopsy, resulting in CICI  She had 1 dose of adjuvant chemotherapy with Taxotere and cyclophosphamide with poor tolerance  She developed significant nausea and vomiting as well as profound fatigue  She decided not to have any further adjuvant chemotherapy  She has skin opening in her right breast   Clinically, she has no evidence of recurrent disease  We discussed the further management of adjuvant therapy  I told her that she may have 10% increase of risk of recurrence by stopping adjuvant chemotherapy  With her knowing this, she decided against further adjuvant chemotherapy  I recommended her to start adjuvant hormonal therapy with anastrozole  Side effects of anastrozole were thoroughly discussed, including but not limited to hot flashes, musculoskeletal symptoms and bone mineral density loss  I recommended her to take calcium vitamin D on regular basis  Because of her right breast skin opening, I will refer her to Dr Erwin Emerson  I also make a referral to radiation oncology  She is in agreement with my recommendation  I will see her again in 6 months for routine follow-up  Subjective:      HPI: See fellow's note            Interval History:  A 59year-old postmenopausal woman with stage IIA right breast cancer, grade 3, ER 90% positive, CA 20% positive, HER2 1+ by IHC  Ki-67 was 50 to 55%  She underwent lumpectomy and sentinel lymph node biopsy, resulting in CICI  She started adjuvant chemotherapy with Taxotere and cyclophosphamide in early May 2023  However, she has significant toxicity with nausea and vomiting  She has been afebrile  She developed significant fatigue  She decided to discontinue adjuvant chemotherapy  She presents today for routine follow-up  Second cycle is scheduled in May 22, 2023  Recently, she noticed opening in her right breast skin at lumpectomy site  She has been afebrile  She has no complaint of pain  She is still active smoker with a few cigarettes per day  Her performance status is normal            Objective:      Primary Diagnosis:     Right breast cancer, stage IIA (pT2,pN0, M0) grade 3, ER 90% positive, WA 20% positive, HER2 1+ by IHC, Ki-67 50 to 55%  Diagnosed in March 2023      Cancer Staging:  [unfilled]        Previous Hematologic/ Oncologic Treatment:      Adjuvant chemotherapy with Taxotere and cyclophosphamide x1 dose, given in early May 2023      Current Hematologic/ Oncologic Treatment:       Adjuvant hormonal therapy with anastrozole to be started in May 2023      Disease Status:      CICI status post lumpectomy and sentinel lymph node biopsy      Test Results:     Pathology:     30 x 23x16 mm of invasive ductal carcinoma, grade 3  No evidence of lymphovascular invasion  3 sentinel lymph nodes were all negative for metastatic disease  ER 90% positive, WA 20% positive, HER2 1+ by IHC  Ki-67 50 to 55%  Stage IIA (pT2,pN0, M0)     Radiology:     Chest x-ray was negative for pulmonary disease      Laboratory:  See below         Physical Exam:        General Appearance:    Alert, oriented          Eyes:    PERRL   Ears:    Normal external ear canals, both ears   Nose:   Nares normal, septum midline   Throat:   Mucosa moist  Pharynx without injection  Neck:   Supple         Lungs:     Clear to auscultation bilaterally   Chest Wall:    No tenderness or deformity    Heart:    Regular rate and rhythm         Abdomen:     Soft, non-tender, bowel sounds +, no organomegaly               Extremities:   Extremities no cyanosis or edema         Skin:   no rash or icterus      Lymph nodes:   Cervical, supraclavicular, and axillary nodes normal   Neurologic:   CNII-XII intact, normal strength, sensation and reflexes     Throughout             Breast exam:   Lumpectomy scar at inner lower quadrant of her right breast    1 5 cm of skin opening at the lumpectomy site  Left breast exam is negative  ROS: Review of Systems   All other systems reviewed and are negative  Imaging: No results found  Labs:   Lab Results   Component Value Date    WBC 6 08 04/27/2023    HGB 12 2 04/27/2023    HCT 38 2 04/27/2023    MCV 91 04/27/2023     04/27/2023     Lab Results   Component Value Date    K 4 8 04/27/2023     04/27/2023    CO2 26 04/27/2023    BUN 16 04/27/2023    CREATININE 1 06 04/27/2023    GLUF 99 04/27/2023    CALCIUM 9 7 04/27/2023    AST 19 04/27/2023    ALT 27 04/27/2023    ALKPHOS 115 04/27/2023    EGFR 56 04/27/2023         Current Medications: Reviewed  Allergies: Reviewed  PMH/FH/SH:  Reviewed      Vital Sign:    Body surface area is 2 02 meters squared      Wt Readings from Last 3 Encounters:   05/15/23 100 kg (221 lb)   05/03/23 100 kg (221 lb)   05/01/23 100 kg (221 lb 7 2 oz)        Temp Readings from Last 3 Encounters:   05/15/23 97 7 °F (36 5 °C) (Temporal)   05/03/23 (!) 97 °F (36 1 °C) (Tympanic)   05/02/23 98 8 °F (37 1 °C) (Temporal)        BP Readings from Last 3 Encounters:   05/15/23 128/88   05/03/23 140/80   05/02/23 (!) 176/79         Pulse Readings from Last 3 Encounters:   05/15/23 105   05/03/23 98   05/02/23 103     @LASTSAO2(3)@

## 2023-05-15 NOTE — TELEPHONE ENCOUNTER
Please cancel all infusion chairs  Thanks 
Please cancel all of the Chemo appointments for the patient 
normal

## 2023-05-15 NOTE — PROGRESS NOTES
Patient is here today secondary to the right breast   She medical oncology who noticed an opening in the incision line  She previously had a small opening that she thinks has enlarged secondary to vomiting and coughing that she reports as side effects of chemo  There is no redness or any appreciable drainage  The area is too large to apply Steri-Strips  I packed her wound and put a dry gauze over top  I gave her wound care instructions for home  I will see her again in 2 days

## 2023-05-17 ENCOUNTER — OFFICE VISIT (OUTPATIENT)
Dept: SURGICAL ONCOLOGY | Facility: CLINIC | Age: 63
End: 2023-05-17

## 2023-05-17 VITALS
HEIGHT: 63 IN | WEIGHT: 211 LBS | HEART RATE: 62 BPM | BODY MASS INDEX: 37.39 KG/M2 | DIASTOLIC BLOOD PRESSURE: 88 MMHG | RESPIRATION RATE: 16 BRPM | TEMPERATURE: 98.1 F | OXYGEN SATURATION: 94 % | SYSTOLIC BLOOD PRESSURE: 124 MMHG

## 2023-05-17 DIAGNOSIS — T14.8XXA OPEN WOUND: Primary | ICD-10-CM

## 2023-05-17 NOTE — PROGRESS NOTES
Patient is here today for another wound check  Her wound is stable, there is no erythema, there is no drainage  The wound was repacked today in the office  I gave her wound care instructions for home    I will see her in 1 week

## 2023-05-18 ENCOUNTER — PATIENT OUTREACH (OUTPATIENT)
Dept: HEMATOLOGY ONCOLOGY | Facility: CLINIC | Age: 63
End: 2023-05-18

## 2023-05-18 NOTE — PROGRESS NOTES
ONN spoke with patient after chemo was stopped and anastrazole prescribed  Pt reports started anastrazole yesterday  ONN reviewed expected side effects and encouraged patient to reach out with any issues  Patient will go to radiation after wound is healed  Will forward to radiation  to inform also

## 2023-05-24 ENCOUNTER — OFFICE VISIT (OUTPATIENT)
Dept: SURGICAL ONCOLOGY | Facility: CLINIC | Age: 63
End: 2023-05-24

## 2023-05-24 VITALS
HEIGHT: 63 IN | HEART RATE: 114 BPM | OXYGEN SATURATION: 94 % | SYSTOLIC BLOOD PRESSURE: 144 MMHG | WEIGHT: 210 LBS | DIASTOLIC BLOOD PRESSURE: 80 MMHG | BODY MASS INDEX: 37.21 KG/M2 | RESPIRATION RATE: 16 BRPM | TEMPERATURE: 98.9 F

## 2023-05-24 DIAGNOSIS — T14.8XXA OPEN WOUND: Primary | ICD-10-CM

## 2023-05-24 NOTE — PROGRESS NOTES
Patient is here today for another wound check  Her lumpectomy scar remains open centrally  There is no erythema or drainage  There is minor contraction of the wound only  I will try using Steri-Strips 1 more time  If this is not effective, I will send her to our wound care center

## 2023-06-01 ENCOUNTER — OFFICE VISIT (OUTPATIENT)
Dept: SURGICAL ONCOLOGY | Facility: CLINIC | Age: 63
End: 2023-06-01

## 2023-06-01 VITALS
RESPIRATION RATE: 18 BRPM | WEIGHT: 211.5 LBS | HEART RATE: 94 BPM | TEMPERATURE: 97.7 F | HEIGHT: 63 IN | DIASTOLIC BLOOD PRESSURE: 84 MMHG | SYSTOLIC BLOOD PRESSURE: 128 MMHG | OXYGEN SATURATION: 94 % | BODY MASS INDEX: 37.47 KG/M2

## 2023-06-01 DIAGNOSIS — Z51.89 VISIT FOR WOUND CHECK: Primary | ICD-10-CM

## 2023-06-01 NOTE — PROGRESS NOTES
Patient is here today for another postop visit to check the right breast wound  Steri-Strips are intact with some drainage underneath  The Steri-Strips were removed  There has been significant improvement in the wound with a small residual opening and mild fibrinous material at the base  There is no drainage or erythema  The wound was cleaned  Benzoin and Steri-Strips were reapplied  I will see her again in 1 week

## 2023-06-08 ENCOUNTER — OFFICE VISIT (OUTPATIENT)
Dept: SURGICAL ONCOLOGY | Facility: CLINIC | Age: 63
End: 2023-06-08

## 2023-06-08 ENCOUNTER — PATIENT OUTREACH (OUTPATIENT)
Dept: HEMATOLOGY ONCOLOGY | Facility: CLINIC | Age: 63
End: 2023-06-08

## 2023-06-08 VITALS
OXYGEN SATURATION: 95 % | RESPIRATION RATE: 18 BRPM | BODY MASS INDEX: 37.56 KG/M2 | HEART RATE: 95 BPM | HEIGHT: 63 IN | SYSTOLIC BLOOD PRESSURE: 130 MMHG | WEIGHT: 212 LBS | TEMPERATURE: 98.2 F | DIASTOLIC BLOOD PRESSURE: 80 MMHG

## 2023-06-08 DIAGNOSIS — C50.311 MALIGNANT NEOPLASM OF LOWER-INNER QUADRANT OF RIGHT BREAST OF FEMALE, ESTROGEN RECEPTOR POSITIVE (HCC): ICD-10-CM

## 2023-06-08 DIAGNOSIS — Z51.89 VISIT FOR WOUND CHECK: Primary | ICD-10-CM

## 2023-06-08 DIAGNOSIS — Z17.0 MALIGNANT NEOPLASM OF LOWER-INNER QUADRANT OF RIGHT BREAST OF FEMALE, ESTROGEN RECEPTOR POSITIVE (HCC): ICD-10-CM

## 2023-06-08 PROCEDURE — 99024 POSTOP FOLLOW-UP VISIT: CPT | Performed by: SURGERY

## 2023-06-08 NOTE — PROGRESS NOTES
59 y o  female is here today s/p right lumpectomy and sentinel node biopsy after  She reports continued drainage  Physical Exam  Constitutional:       General: She is not in acute distress  Appearance: Normal appearance  Chest:   Breasts:     Right: Skin change ( Residual stable opening along the central portion of the incision line, wound was cleaned today, benzoin and Steri-Strips were reapplied) present  Neurological:      Mental Status: She is alert and oriented to person, place, and time  Psychiatric:         Mood and Affect: Mood normal              Diagnoses and all orders for this visit:    Visit for wound check    Malignant neoplasm of lower-inner quadrant of right breast of female, estrogen receptor positive ([facility] [address] )  -     Ambulatory referral to Radiation Oncology; Future        Assessment/Plan: 59-year-old female status post right breast conservation  She had 1 cycle of chemotherapy  She had poor tolerance of this and is currently on anastrozole  She developed an opening in the incision line around that time  She has been receiving local wound care  She was smoking preoperatively and was counseled on smoking cessation and wound healing issues were discussed with her  I previously discussed sending her to our wound care center  She would like to continue conservative management with me for now  I will see her next week  If there is been no further progress, I will send her to the wound care center as she needs to start radiation therapy  She has yet to be seen by radiation oncology  This consult order was placed by medical oncology but not yet scheduled

## 2023-06-13 ENCOUNTER — OFFICE VISIT (OUTPATIENT)
Dept: SURGICAL ONCOLOGY | Facility: CLINIC | Age: 63
End: 2023-06-13

## 2023-06-13 VITALS
HEART RATE: 104 BPM | RESPIRATION RATE: 18 BRPM | SYSTOLIC BLOOD PRESSURE: 158 MMHG | OXYGEN SATURATION: 96 % | TEMPERATURE: 97.4 F | BODY MASS INDEX: 37.49 KG/M2 | HEIGHT: 63 IN | WEIGHT: 211.6 LBS | DIASTOLIC BLOOD PRESSURE: 84 MMHG

## 2023-06-13 DIAGNOSIS — T14.8XXA OPEN WOUND: Primary | ICD-10-CM

## 2023-06-13 PROCEDURE — 99024 POSTOP FOLLOW-UP VISIT: CPT | Performed by: SURGERY

## 2023-06-13 NOTE — PROGRESS NOTES
Patient is here today for another wound check  She states that the Steri-Strips have held but she notices some drainage underneath  On exam today, the tape strips were removed  There is no erythema or other signs of infection  There is fibrinous material in the base of the wound  The wound has contracted significantly over the past week  I will therefore continue the conservative wound care management for her  I again discussed the impact of smoking on wound healing  She states that she now has an appointment with radiation oncology on the 27th of this month  I will see her again in 1 week

## 2023-06-20 ENCOUNTER — OFFICE VISIT (OUTPATIENT)
Dept: SURGICAL ONCOLOGY | Facility: CLINIC | Age: 63
End: 2023-06-20

## 2023-06-20 VITALS
WEIGHT: 211 LBS | DIASTOLIC BLOOD PRESSURE: 80 MMHG | RESPIRATION RATE: 16 BRPM | BODY MASS INDEX: 37.39 KG/M2 | HEART RATE: 97 BPM | HEIGHT: 63 IN | SYSTOLIC BLOOD PRESSURE: 140 MMHG | TEMPERATURE: 97.5 F | OXYGEN SATURATION: 97 %

## 2023-06-20 DIAGNOSIS — T14.8XXA OPEN WOUND: Primary | ICD-10-CM

## 2023-06-20 PROCEDURE — 99024 POSTOP FOLLOW-UP VISIT: CPT | Performed by: SURGERY

## 2023-06-20 RX ORDER — CEPHALEXIN 500 MG/1
500 CAPSULE ORAL EVERY 8 HOURS SCHEDULED
Qty: 21 CAPSULE | Refills: 0 | Status: SHIPPED | OUTPATIENT
Start: 2023-06-20 | End: 2023-06-27

## 2023-06-20 NOTE — PROGRESS NOTES
59 y o  female is here today s/p right lumpectomy and sentinel node biopsy  She reports no changes  Physical Exam  Constitutional:       General: She is not in acute distress  Chest:   Breasts:     Right: Skin change ( Small residual opening in the central portion of the lumpectomy scar, no erythema, no drainage) present  Comments: Wound was cleaned  1% lidocaine plain was used for local anesthesia  Two 3-0 nylon sutures were used to approximate the skin edges and the wound opening  Antibiotic ointment and a dry gauze were applied  Neurological:      Mental Status: She is alert and oriented to person, place, and time  Psychiatric:         Mood and Affect: Mood normal          Diagnoses and all orders for this visit:    Open wound  -     cephalexin (KEFLEX) 500 mg capsule; Take 1 capsule (500 mg total) by mouth every 8 (eight) hours for 7 days        Assessment/Plan: 44-year-old female status post right lumpectomy and sentinel node biopsy  She started adjuvant chemo but discontinued this  She is currently on anastrozole  She is scheduled to meet with radiation oncology next week  She still has 1 very small residual opening in the central portion of her lumpectomy incision  The wound is clean without signs of infection  I reapproximated the small opening with 2 nylon sutures today in the office  I will put her on antibiotics for preventative purposes  I will see her in about 1-1/2 weeks for suture removal   The patient is a smoker and was previously counseled on smoking cessation

## 2023-06-23 ENCOUNTER — TELEPHONE (OUTPATIENT)
Dept: INFUSION CENTER | Facility: CLINIC | Age: 63
End: 2023-06-23

## 2023-06-29 ENCOUNTER — OFFICE VISIT (OUTPATIENT)
Dept: SURGICAL ONCOLOGY | Facility: CLINIC | Age: 63
End: 2023-06-29

## 2023-06-29 ENCOUNTER — PATIENT OUTREACH (OUTPATIENT)
Dept: HEMATOLOGY ONCOLOGY | Facility: CLINIC | Age: 63
End: 2023-06-29

## 2023-06-29 VITALS
HEART RATE: 83 BPM | SYSTOLIC BLOOD PRESSURE: 124 MMHG | RESPIRATION RATE: 18 BRPM | OXYGEN SATURATION: 97 % | BODY MASS INDEX: 37.39 KG/M2 | TEMPERATURE: 97.9 F | HEIGHT: 63 IN | DIASTOLIC BLOOD PRESSURE: 82 MMHG | WEIGHT: 211 LBS

## 2023-06-29 DIAGNOSIS — Z17.0 MALIGNANT NEOPLASM OF LOWER-INNER QUADRANT OF RIGHT BREAST OF FEMALE, ESTROGEN RECEPTOR POSITIVE (HCC): ICD-10-CM

## 2023-06-29 DIAGNOSIS — C50.311 MALIGNANT NEOPLASM OF LOWER-INNER QUADRANT OF RIGHT BREAST OF FEMALE, ESTROGEN RECEPTOR POSITIVE (HCC): ICD-10-CM

## 2023-06-29 DIAGNOSIS — Z51.89 VISIT FOR WOUND CHECK: Primary | ICD-10-CM

## 2023-06-29 PROBLEM — T14.8XXA OPEN WOUND: Status: RESOLVED | Noted: 2023-05-15 | Resolved: 2023-06-29

## 2023-06-29 PROCEDURE — 99024 POSTOP FOLLOW-UP VISIT: CPT | Performed by: SURGERY

## 2023-06-29 NOTE — PROGRESS NOTES
I received a in basket message from YAMILKA Shepard to follow up with Pt to have forms filled out for [employer]   I out reached Pt and had her verbally consent to the Terms of Service and the Qualifier Tool  Pt states that she does not need transportation as of yet but will call me when she needs me to set that up for her, Pt is aware she needs to give at least 48 hrs  notice  I have uploaded the paperwork into Media  Pt has no further questions or concerns at this time  I did encourage her to call if any were to arise

## 2023-06-29 NOTE — PROGRESS NOTES
59 y o  female is here today s/p right lumpectomy and sentinel node biopsy  Physical Exam  Constitutional:       General: She is not in acute distress  Appearance: Normal appearance  Chest:   Breasts:     Right: Skin change ( Lumpectomy incision is intact, sutures x2 removed and Steri-Strips applied, no evidence of infection) present  Neurological:      Mental Status: She is alert and oriented to person, place, and time  Psychiatric:         Mood and Affect: Mood normal              Diagnoses and all orders for this visit:    Visit for wound check    Malignant neoplasm of lower-inner quadrant of right breast of female, estrogen receptor positive ([facility] [address] )        Assessment/Plan: 50-year-old female status post right breast lumpectomy and sentinel node biopsy  She had a small portion of the lumpectomy site that was not healing  She is a smoker and was counseled on this preoperatively  The area was packed and then Steri-Strips were applied and eventually the area was resutured  The sutures were removed today in the office  The wound was intact  This was reinforced with additional Steri-Strips  We will see her again as scheduled for her survivorship visit  She states that she canceled her radiation oncology consult  I advised her to reschedule this  She is apparently having some transportation issues  I advised her to reach out to our nurse navigator to see if she could assist with transportation

## 2023-06-30 ENCOUNTER — PATIENT OUTREACH (OUTPATIENT)
Dept: HEMATOLOGY ONCOLOGY | Facility: CLINIC | Age: 63
End: 2023-06-30

## 2023-06-30 NOTE — PROGRESS NOTES
Pt called and stated that she needs [employer] set up for 7/11/23 @ 9:00 in [facility] for Radiation consult  I did send via E-mail to [employer] the request and they did confirm  for 7/11/23  Pt is aware that this has been confirmed and has no other questions or concerns at this time  I did encourage her to call if any were to arise

## 2023-07-11 ENCOUNTER — TELEPHONE (OUTPATIENT)
Dept: HEMATOLOGY ONCOLOGY | Facility: CLINIC | Age: 63
End: 2023-07-11

## 2023-07-11 ENCOUNTER — RADIATION ONCOLOGY CONSULT (OUTPATIENT)
Dept: RADIATION ONCOLOGY | Facility: CLINIC | Age: 63
End: 2023-07-11
Attending: RADIOLOGY
Payer: COMMERCIAL

## 2023-07-11 ENCOUNTER — OFFICE VISIT (OUTPATIENT)
Dept: SURGICAL ONCOLOGY | Facility: CLINIC | Age: 63
End: 2023-07-11

## 2023-07-11 VITALS
HEART RATE: 78 BPM | OXYGEN SATURATION: 95 % | WEIGHT: 215.61 LBS | HEIGHT: 63 IN | TEMPERATURE: 97.5 F | BODY MASS INDEX: 38.2 KG/M2 | SYSTOLIC BLOOD PRESSURE: 144 MMHG | DIASTOLIC BLOOD PRESSURE: 82 MMHG

## 2023-07-11 VITALS
HEIGHT: 63 IN | SYSTOLIC BLOOD PRESSURE: 124 MMHG | WEIGHT: 215 LBS | HEART RATE: 68 BPM | OXYGEN SATURATION: 96 % | DIASTOLIC BLOOD PRESSURE: 64 MMHG | TEMPERATURE: 97.7 F | RESPIRATION RATE: 18 BRPM | BODY MASS INDEX: 38.09 KG/M2

## 2023-07-11 DIAGNOSIS — C50.311 MALIGNANT NEOPLASM OF LOWER-INNER QUADRANT OF RIGHT BREAST OF FEMALE, ESTROGEN RECEPTOR POSITIVE (HCC): ICD-10-CM

## 2023-07-11 DIAGNOSIS — T14.8XXA OPEN WOUND: ICD-10-CM

## 2023-07-11 DIAGNOSIS — C50.311 MALIGNANT NEOPLASM OF LOWER-INNER QUADRANT OF RIGHT BREAST OF FEMALE, ESTROGEN RECEPTOR POSITIVE (HCC): Primary | ICD-10-CM

## 2023-07-11 DIAGNOSIS — Z72.0 TOBACCO ABUSE: ICD-10-CM

## 2023-07-11 DIAGNOSIS — Z51.89 VISIT FOR WOUND CHECK: Primary | ICD-10-CM

## 2023-07-11 DIAGNOSIS — Z17.0 MALIGNANT NEOPLASM OF LOWER-INNER QUADRANT OF RIGHT BREAST OF FEMALE, ESTROGEN RECEPTOR POSITIVE (HCC): Primary | ICD-10-CM

## 2023-07-11 DIAGNOSIS — Z17.0 MALIGNANT NEOPLASM OF LOWER-INNER QUADRANT OF RIGHT BREAST OF FEMALE, ESTROGEN RECEPTOR POSITIVE (HCC): ICD-10-CM

## 2023-07-11 PROCEDURE — 99205 OFFICE O/P NEW HI 60 MIN: CPT | Performed by: RADIOLOGY

## 2023-07-11 PROCEDURE — 99211 OFF/OP EST MAY X REQ PHY/QHP: CPT | Performed by: RADIOLOGY

## 2023-07-11 PROCEDURE — 99024 POSTOP FOLLOW-UP VISIT: CPT | Performed by: NURSE PRACTITIONER

## 2023-07-11 NOTE — TELEPHONE ENCOUNTER
Returned call to Alexa Hamilton. She verbalized that they have contacted CAMMY Herman who will see Sharron Villafana today.

## 2023-07-11 NOTE — TELEPHONE ENCOUNTER
Call Transfer   Who are you speaking with? [facility], Dr. Alicia William office. If it is not the patient, are they listed on an active communication consent form? N/A   Who is the patients HemOnc/SurgOnc provider? Dr. Lisandra Piedra   What is the reason for this call? Claude Aviles is calling to inform Dr. Lisandra Piedra that the patient's incision is open and Dr. Bairon Palmer would like to know what Dr. Lisandra Piedra would like them to do. The patient is currently in the office. Person/Department that the call was transferred to? Time that call was transferred? Sudha Kay @10:02AM    Your call will be transferred now. If you receive a voicemail, please leave a detailed message and a member of the team will return your call as soon as possible. Did you relay this information to the caller?   Yes

## 2023-07-11 NOTE — PROGRESS NOTES
Surgical Oncology Follow Up       [address] [facility]  [address] [address]    Vasiliy Roz  1960  [id number]  [address]  [facility] [address] [address]    Chief Complaint   Patient presents with   • Follow-up       Assessment/Plan:  1. Visit for wound check    2. Malignant neoplasm of lower-inner quadrant of right breast of female, estrogen receptor positive ([address])  - Ambulatory referral to Wound Care; Future    3. Open wound  - Ambulatory referral to Wound Care; Future    4. Tobacco abuse  - Ambulatory referral to Wound Care; Future       Discussion/Summary: Patient is a 50-year-old female that was diagnosed with a right-sided breast cancer in February 2023. Her pathology revealed invasive ductal carcinoma, ER 9095%, AR 15 to 25%, HER2 negative. She underwent a right lumpectomy and sentinel node biopsy with Dr. Kg Pickering on March 28, 2023. She developed a right breast open wound which we have treated conservatively. The wound has been packed and even sutured/reinforced with steri strips and continues to not heal completely. This has been ongoing for approximately 2 months. She has a 1 cm opening along the medial aspect of the breast surgical scar which is about 5 mm deep. No evidence of infection. Smoking cessation was encouraged. Patient is unable to start RT until the wound has healed. I discussed her case with Dr Kg Pickering and am referring her to the wound clinic for further treatment. I have instructed the patient to apply topical antibiotic ointment and a dry dressing until she can be further evaluated by the wound clinic. I will see her back as previously scheduled for her survivorship visit.        History of Present Illness:     Oncology History   Malignant neoplasm of lower-inner quadrant of right breast of female, estrogen receptor positive ([address])   2/22/2023 Initial Diagnosis    Malignant neoplasm of lower-inner quadrant of right breast of female, estrogen receptor positive ([address])     2/22/2023 -  Cancer Staged    Staging form: Breast, AJCC 8th Edition  - Clinical stage from 2/22/2023: Stage IIA (cT2, cN0, cM0, G3, ER+, [place]+, HER2-) - Signed by Violet Bettencourt MD on 2/22/2023  Stage prefix: Initial diagnosis  Method of lymph node assessment: Clinical  Histologic grading system: 3 grade system       4/17/2023 -  Cancer Staged    Staging form: Breast, AJCC 8th Edition  - Pathologic stage from 4/17/2023: Stage IB (pT2, pN0(sn), cM0, G3, ER+, [place]+, HER2-) - Signed by Violet Bettencourt MD on 4/17/2023  Stage prefix: Initial diagnosis  Method of lymph node assessment: [place] lymph node biopsy  Histologic grading system: 3 grade system       5/1/2023 - 5/2/2023 Chemotherapy    Pegfilgrastim-bmez (ZIEXTENZO), 6 mg, Subcutaneous, Once, 1 of 4 cycles  Administration: 6 mg (5/2/2023)  cyclophosphamide (CYTOXAN) IVPB, 600 mg/m2 = 1,170 mg, Intravenous, Once, 1 of 4 cycles  Administration: 1,170 mg (5/1/2023)  DOCEtaxel (TAXOTERE) chemo infusion, 75 mg/m2 = 146.2 mg, Intravenous, Once, 1 of 4 cycles  Administration: 146 mg (5/1/2023)          -Interval History: Patient presents today for a wound check. She was seen in radiation oncology who referred her back to our office secondary to her right breast wound. She reports that once the Steri-Strips were removed earlier today the wound was noted to be open again. Patient was unaware that the wound was opened prior to this. Denies fevers or chills or persistent pain in the area. She states that she is smoking about 5 cigarettes/day. Review of Systems:  Review of Systems   Constitutional: Negative for chills and fever. Skin: Positive for wound. Negative for color change.        Patient Active Problem List   Diagnosis   • Essential hypertension   • Chronic right shoulder pain   • Encounter for hepatitis C virus screening test for high risk patient   • Colon cancer screening   • Gait abnormality   • Light cigarette smoker (1-9 cigarettes per day)   • Seasonal allergic rhinitis due to pollen   • Mixed hyperlipidemia   • Major depressive disorder   • Anxiety   • Tobacco abuse   • Influenza vaccine refused   • Obesity (BMI 35.0-39.9 without comorbidity)   • Malignant neoplasm of lower-inner quadrant of right breast of female, estrogen receptor positive (HCC)   • Pre-operative clearance   • CKD (chronic kidney disease), stage II   • Chemotherapy induced neutropenia (HCC)   • Visit for wound check     Past Medical History:   Diagnosis Date   • Anxiety    • Arthritis     right foot/ back./ hands   • Breast cancer ([address])    • Cellulitis 09/13/2019   • COVID     x 2--3/7/22 and approx Sept 2022.  recovered @ home   • Heartburn    • Hypertension    • Hypokalemia 04/01/2020   • Pneumonia of both lungs due to infectious organism 08/19/2019    pt denies   • Tobacco abuse    • Wears dentures     full uppers   • Wears glasses      Past Surgical History:   Procedure Laterality Date   • APPENDECTOMY      APPENDISITIS SURGERY AGE 19   • BREAST BIOPSY Right 01/31/2023   • BREAST LUMPECTOMY Right 3/28/2023    Procedure: LUMPECTOMY BREAST MARY  LOCALIZED;  Surgeon: Joao Freeman MD;  Location: [facility];  Service: Surgical Oncology   • LYMPH NODE BIOPSY Right 3/28/2023    Procedure: BIOPSY LYMPH NODE, SENTINEL LYMPHOSCINTIGRAPHY, LYMPHATIC MAPPING;  Surgeon: Joao Freeman MD;  Location: [facility];  Service: Surgical Oncology   • US GUIDED BREAST BIOPSY RIGHT COMPLETE Right 01/31/2023     Family History   Problem Relation Age of Onset   • COPD Mother    • No Known Problems Father    • No Known Problems Brother    • No Known Problems Brother    • No Known Problems Brother    • No Known Problems Daughter    • No Known Problems Maternal Aunt    • Lung cancer Maternal Uncle         age at dx unk   • Ovarian cancer Maternal Grandmother         age at dx unk   • Prostate cancer Maternal Grandfather         age at dx unk   • No Known Problems Paternal Grandmother    • No Known Problems Paternal Grandfather    • Breast cancer Neg Hx    • Colon cancer Neg Hx      Social History     Socioeconomic History   • Marital status:      Spouse name: Not on file   • Number of children: Not on file   • Years of education: Not on file   • Highest education level: Not on file   Occupational History   • Not on file   Tobacco Use   • Smoking status: Every Day     Packs/day: 0.03     Years: 45.00     Total pack years: 1.35     Types: Cigarettes     Passive exposure: Never   • Smokeless tobacco: Never   • Tobacco comments:     States she is smoking 4-5 cig/day   Vaping Use   • Vaping Use: Never used   Substance and Sexual Activity   • Alcohol use: Not Currently     Alcohol/week: 7.0 standard drinks of alcohol     Types: 7 Glasses of wine per week     Comment: quit 2 years ago Oct 2020   • Drug use: Never   • Sexual activity: Not on file   Other Topics Concern   • Not on file   Social History Narrative   • Not on file     Social Determinants of Health     Financial Resource Strain: Not on file   Food Insecurity: Not on file   Transportation Needs: Not on file   Physical Activity: Not on file   Stress: Not on file   Social Connections: Not on file   Intimate Partner Violence: Not on file   Housing Stability: Not on file       Current Outpatient Medications:   •  acetaminophen (TYLENOL) 650 mg CR tablet, Take 1 tablet (650 mg total) by mouth every 8 (eight) hours as needed for mild pain, Disp: 30 tablet, Rfl: 3  •  albuterol (Ventolin HFA) 90 mcg/act inhaler, Inhale 2 puffs every 6 (six) hours as needed for wheezing, Disp: 18 g, Rfl: 1  •  anastrozole (ARIMIDEX) 1 mg tablet, Take 1 tablet (1 mg total) by mouth daily, Disp: 90 tablet, Rfl: 1  •  atorvastatin (LIPITOR) 10 mg tablet, Take 1 tablet (10 mg total) by mouth daily, Disp: 90 tablet, Rfl: 1  •  buPROPion (Wellbutrin XL) 150 mg 24 hr tablet, Take 1 tablet (150 mg total) by mouth every morning, Disp: 90 tablet, Rfl: 1  •  ibuprofen (MOTRIN) 200 mg tablet, Take 200-800 mg by mouth every 6 (six) hours as needed for mild pain, Disp: , Rfl:   •  irbesartan (AVAPRO) 300 mg tablet, Take 1 tablet (300 mg total) by mouth daily at bedtime, Disp: 90 tablet, Rfl: 1  •  sertraline (ZOLOFT) 50 mg tablet, Take 1 tablet (50 mg total) by mouth daily, Disp: 30 tablet, Rfl: 0  •  hydrochlorothiazide (HYDRODIURIL) 12.5 mg tablet, Take 1 tablet (12.5 mg total) by mouth daily (Patient not taking: Reported on 7/11/2023), Disp: 90 tablet, Rfl: 1  •  ondansetron (ZOFRAN) 4 mg tablet, Take 1 tablet (4 mg total) by mouth every 8 (eight) hours as needed for nausea or vomiting (Patient not taking: Reported on 7/11/2023), Disp: 30 tablet, Rfl: 1  •  traMADol (ULTRAM) 50 mg tablet, Take 1 tablet (50 mg total) by mouth every 8 (eight) hours as needed for moderate pain (Patient not taking: Reported on 6/13/2023), Disp: 9 tablet, Rfl: 0  Allergies   Allergen Reactions   • Amlodipine Edema   • Latex      Added based on information entered during log entry, please review and add reactions, type, and severity as needed   • Lisinopril Cough   • Pollen Extract      Vitals:    07/11/23 1127   BP: 124/64   Pulse: 68   Resp: 18   Temp: 97.7 °F (36.5 °C)   SpO2: 96%       Physical Exam  Vitals reviewed. Constitutional:       Appearance: Normal appearance. HENT:      Head: Normocephalic and atraumatic. Pulmonary:      Effort: Pulmonary effort is normal.   Chest:      Comments: Right upper breast surgical scar with opening at the medial aspect. The length of the wound is approximately 1 cm and is approximately 5 mm deep. There is no drainage or surrounding erythema. The wound was irrigated with normal saline solution. No tunneling. Topical antibiotic ointment and gauze and tape were applied for dressing. Neurological:      General: No focal deficit present.       Mental Status: She is alert and oriented to person, place, and time. Psychiatric:         Mood and Affect: Mood normal.           Advance Care Planning/Advance Directives:  Discussed disease status, cancer treatment plans and/or cancer treatment goals with the patient.

## 2023-07-11 NOTE — PROGRESS NOTES
Claudia Zelaya 1960 is a 59 y.o. female Presents in consult for discussion of RT for recently diagnosed ER/[place]+ HER2-, Stage IB right breast cancer. Referred by Dr. Lidia Uribe. Presented for routine screening mammogram with results revealing 2 separate masses in the right breast.  Ultimately breast biopsy completed with results revealing invasive ductal carcinoma. 2/22/23  Dr. Oj Lundy  Pathology reviewed. Good candidate for breast conservation. Declining genetic screening. Referred to smoking cessation.     3/28/23  Tissue Exam  INVASIVE CARCINOMA OF THE BREAST: Resection  8th Edition - Protocol posted: 9/21/2022  INVASIVE CARCINOMA OF THE BREAST: COMPLETE EXCISION - All Specimens  SPECIMEN   Procedure  Excision (less than total mastectomy)    Specimen Laterality  Right    TUMOR   Tumor Site  Clock position      3 o'clock    Tumor Site  Distance from nipple (Centimeters): 5 cm   Histologic Type  Invasive carcinoma of no special type (ductal)    Histologic Grade (Herberth Histologic Score)     Glandular (Acinar) / Tubular Differentiation  Score 3    Nuclear Pleomorphism  Score 3    Mitotic Rate  Score 3    Overall Grade  Grade 3 (scores of 8 or 9)    Tumor Size  Greatest dimension of largest invasive focus (Millimeters): 30 mm   Additional Dimension (Millimeters)  23 mm     16 mm   Tumor Focality  Single focus of invasive carcinoma    Ductal Carcinoma In Situ (DCIS)  Present      Positive for extensive intraductal component (EIC)    Size (Extent) of DCIS  Estimated size (extent) of DCIS is at least (Millimeters): 15 mm   Number of Blocks with DCIS  3    Number of Blocks Examined  14    Architectural Patterns  [place]      Cribriform      Solid    Nuclear Grade  Grade III (high)    Necrosis  Present, central (expansive "comedo" necrosis)    Lobular Carcinoma In Situ (LCIS)  Not identified    Lymphovascular Invasion  Not identified    Dermal Lymphovascular Invasion  Not identified    Microcalcifications Present in DCIS      Present in invasive carcinoma    Treatment Effect in the Breast  No known presurgical therapy    MARGINS   Margin Status for Invasive Carcinoma  All margins negative for invasive carcinoma    Distance from Invasive Carcinoma to Closest Margin  5 mm   Closest Margin(s) to Invasive Carcinoma  Anterior    Margin Status for DCIS  All margins negative for DCIS    Distance from DCIS to Closest Margin  4 mm   Closest Margin(s) to DCIS  Medial    Margin Comment  Please see parts B for status of additional medial/superior margin excised    REGIONAL LYMPH NODES   Regional Lymph Node Status  All regional lymph nodes negative for tumor    Total Number of Lymph Nodes Examined (sentinel and non-sentinel)  3    Number of [place] Nodes Examined  3    Regional Lymph Node Comment  AE1/AE3 cytokeratin immunostain ordered on block C1. ADDENDUM TO FOLLOW    PATHOLOGIC STAGE CLASSIFICATION (pTNM, AJCC 8th Edition)   Reporting of pT, pN, and (when applicable) pM categories is based on information available to the pathologist at the time the report is issued. As per the AJCC (Chapter 1, 8th Ed.) it is the managing physician's responsibility to establish the final pathologic stage based upon all pertinent information, including but potentially not limited to this pathology report. pT Category  pT2    Regional Lymph Nodes Modifier  (sn): [place] node(s) evaluated.     pN Category  pN not assigned (cannot be determined based on available pathological information)    ADDITIONAL FINDINGS   Additional Findings  AE1/AE3 cytokeratin immunostain ordered on block C1. ADDENDUM TO FOLLOW         Estrogen Receptor (ER) Status  Positive (greater than 10% of cells demonstrate nuclear positivity)    Percentage of Cells with Nuclear Positivity  90-95 %        Progesterone Receptor (PgR) Status  Positive    Percentage of Cells with Nuclear Positivity  15-25 %        HER2 (by immunohistochemistry)  Negative (Score 1+)         Ki-67 Percentage of Positive Nuclei  25 %   Testing Performed on Case Number  [id number]    Comment(s)  Receptor studies will be repeated for the poorly differentiated carcinoma. ADDENDUM TO FOLLOW    . Gross Description      4/17/23  Dr. Perez Kaivonnie  Daughter completed genetic testing with negative result. After lengthy discussion, has decided on Taxotere and cyclophosphamide. 5/3/23  Dr. Anuj Mcguire  Seen for draining incision. Small opening noted in central portion of incision. Wound cleansed and steri strips applied    5/15/23  Dr. Lobo Stock  Had 1 dose of adjuvant chemotherapy with Taxotere and cyclophosphamide with poor tolerance. She developed significant nausea and vomiting as well as profound fatigue. Has decided not to have any further adjuvant chemotherapy. Recommend adjuvant hormonal therapy with anastrozole. Referred to radiation oncology. F/U in 6 months    6/13/23  Dr. Anuj Mcguire  Has been seen for ongoing wound checks. No erythema or signs of infection. Will continue with conservative wound management. 6/20/23 Dr. Anuj Mcguire  1 small residual opening in central portion of lumpectomy scar. Wound is clean without s/s of infection, was reapproximated in office. Keflex ordered prophylactic. F/u in 1.5 weeks. 6/29/23  Dr. Anuj Mcguire  Incision intact, without s/s of infection. Sutures removed. Steri-stirps applied. Advised to reschedule appointment with radiation oncology.     Upcoming:  10/17/23  Surgical Oncology  11/16/23   Medical Oncology          Oncology History   Malignant neoplasm of lower-inner quadrant of right breast of female, estrogen receptor positive ([address])   2/22/2023 Initial Diagnosis    Malignant neoplasm of lower-inner quadrant of right breast of female, estrogen receptor positive ([address])     2/22/2023 -  Cancer Staged    Staging form: Breast, AJCC 8th Edition  - Clinical stage from 2/22/2023: Stage IIA (cT2, cN0, cM0, G3, ER+, [place]+, HER2-) - Signed by Mendel Negri, MD on 2/22/2023  Stage prefix: Initial diagnosis  Method of lymph node assessment: Clinical  Histologic grading system: 3 grade system       2023 -  Cancer Staged    Staging form: Breast, AJCC 8th Edition  - Pathologic stage from 2023: Stage IB (pT2, pN0(sn), cM0, G3, ER+, [place]+, HER2-) - Signed by Rosina Clay MD on 2023  Stage prefix: Initial diagnosis  Method of lymph node assessment: [place] lymph node biopsy  Histologic grading system: 3 grade system       2023 - 2023 Chemotherapy    Pegfilgrastim-bmez (ZIEXTENZO), 6 mg, Subcutaneous, Once, 1 of 4 cycles  Administration: 6 mg (2023)  cyclophosphamide (CYTOXAN) IVPB, 600 mg/m2 = 1,170 mg, Intravenous, Once, 1 of 4 cycles  Administration: 1,170 mg (2023)  DOCEtaxel (TAXOTERE) chemo infusion, 75 mg/m2 = 146.2 mg, Intravenous, Once, 1 of 4 cycles  Administration: 146 mg (2023)         Review of Systems:  Review of Systems   Constitutional: Negative. Negative for unexpected weight change. HENT: Negative. Eyes: Negative. Glasses   Respiratory: Negative. Needs albuterol with acute URI   Cardiovascular: Negative for leg swelling. Gastrointestinal: Negative. Endocrine: Positive for heat intolerance. Genitourinary: Positive for frequency. Negative for difficulty urinating. Musculoskeletal: Positive for back pain. C/o itching in nipple   Skin: Positive for wound (breast incision was opened). Allergic/Immunologic: Positive for environmental allergies. Negative for food allergies. Neurological: Positive for numbness. Light-headedness: incisional numbness. Hematological: Negative. Psychiatric/Behavioral: Positive for sleep disturbance (wakes frequently). Clinical Trial: no    OB/GYN History:  The patient underwent menarche at 16 years  Menopause Status Post  No LMP recorded. Patient is postmenopausal.  Menopause at 54} years.   Menopause Reason natural  Hormone replacement therapy: no.  Years used 0   1 Para 1   Age at first delivery being 22 years. Nursing: no.   Birth control pills: yes. Years used < 1 month    Pregnancy test needed:  no    ONCOTYPE/MAMMOPRINT results    Prior Radiation: none    Teaching  [facility] radiation oncology booklet given    MST completed    Implantable Devices (Port, Pacemaker, pain stimulator): none          [unfilled]  Health Maintenance   Topic Date Due   • Pneumococcal Vaccine: Pediatrics (0 to 5 Years) and At-Risk Patients (6 to 59 Years) (1 - PCV) Never done   • HIV Screening  Never done   • Annual Physical  Never done   • DTaP,Tdap,and Td Vaccines (1 - Tdap) Never done   • Cervical Cancer Screening  Never done   • Colorectal Cancer Screening  Never done   • Osteoporosis Screening  Never done   • Lung Cancer Screening  Never done   • COVID-19 Vaccine (2 - Moderna risk series) 09/27/2021   • Depression Remission PHQ  07/06/2023   • Influenza Vaccine (1) 09/01/2023   • Breast Cancer Screening: Mammogram  01/17/2024   • BMI: Followup Plan  03/07/2024   • BMI: Adult  06/29/2024   • Hepatitis C Screening  Completed   • HIB Vaccine  Aged Out   • IPV Vaccine  Aged Out   • Hepatitis A Vaccine  Aged Out   • Meningococcal ACWY Vaccine  Aged Out   • HPV Vaccine  Aged Out     Past Medical History:   Diagnosis Date   • Anxiety    • Arthritis     right foot/ back./ hands   • Cellulitis 09/13/2019   • COVID     x 2--3/7/22 and approx Sept 2022.  recovered @ home   • Heartburn    • Hypertension    • Hypokalemia 04/01/2020   • Pneumonia of both lungs due to infectious organism 08/19/2019    pt denies   • Tobacco abuse    • Wears dentures     full uppers   • Wears glasses      Past Surgical History:   Procedure Laterality Date   • APPENDECTOMY      APPENDISITIS SURGERY AGE 19   • BREAST BIOPSY Right 01/31/2023   • BREAST LUMPECTOMY Right 3/28/2023    Procedure: LUMPECTOMY BREAST MARY  LOCALIZED;  Surgeon: Valeria Rivera MD;  [facility];  Service: Surgical Oncology   • LYMPH NODE BIOPSY Right 3/28/2023    Procedure: BIOPSY LYMPH NODE, SENTINEL LYMPHOSCINTIGRAPHY, LYMPHATIC MAPPING;  Surgeon: Leigh Paige MD;  Location[facility];  Service: Surgical Oncology   • US GUIDED BREAST BIOPSY RIGHT COMPLETE Right 01/31/2023     Family History   Problem Relation Age of Onset   • COPD Mother    • No Known Problems Father    • No Known Problems Brother    • No Known Problems Brother    • No Known Problems Brother    • No Known Problems Daughter    • No Known Problems Maternal Aunt    • Lung cancer Maternal Uncle         age at dx unk   • Ovarian cancer Maternal Grandmother         age at dx unk   • Prostate cancer Maternal Grandfather         age at dx unk   • No Known Problems Paternal Grandmother    • No Known Problems Paternal Grandfather    • Breast cancer Neg Hx    • Colon cancer Neg Hx      Social History     Tobacco Use   • Smoking status: Every Day     Packs/day: 0.50     Years: 45.00     Total pack years: 22.50     Types: Cigarettes     Passive exposure: Never   • Smokeless tobacco: Never   • Tobacco comments:     CUT DOWN TO 2-3 PER DAY - 5/15/2023   Vaping Use   • Vaping Use: Never used   Substance Use Topics   • Alcohol use: Not Currently     Alcohol/week: 7.0 standard drinks of alcohol     Types: 7 Glasses of wine per week     Comment: quit 2 years ago Oct 2020   • Drug use: Not Currently        Current Outpatient Medications:   •  acetaminophen (TYLENOL) 650 mg CR tablet, Take 1 tablet (650 mg total) by mouth every 8 (eight) hours as needed for mild pain, Disp: 30 tablet, Rfl: 3  •  albuterol (Ventolin HFA) 90 mcg/act inhaler, Inhale 2 puffs every 6 (six) hours as needed for wheezing, Disp: 18 g, Rfl: 1  •  anastrozole (ARIMIDEX) 1 mg tablet, Take 1 tablet (1 mg total) by mouth daily, Disp: 90 tablet, Rfl: 1  •  atorvastatin (LIPITOR) 10 mg tablet, Take 1 tablet (10 mg total) by mouth daily, Disp: 90 tablet, Rfl: 1  •  buPROPion (Wellbutrin XL) 150 mg 24 hr tablet, Take 1 tablet (150 mg total) by mouth every morning, Disp: 90 tablet, Rfl: 1  •  hydrochlorothiazide (HYDRODIURIL) 12.5 mg tablet, Take 1 tablet (12.5 mg total) by mouth daily, Disp: 90 tablet, Rfl: 1  •  ibuprofen (MOTRIN) 200 mg tablet, Take 200-800 mg by mouth every 6 (six) hours as needed for mild pain, Disp: , Rfl:   •  irbesartan (AVAPRO) 300 mg tablet, Take 1 tablet (300 mg total) by mouth daily at bedtime, Disp: 90 tablet, Rfl: 1  •  ondansetron (ZOFRAN) 4 mg tablet, Take 1 tablet (4 mg total) by mouth every 8 (eight) hours as needed for nausea or vomiting, Disp: 30 tablet, Rfl: 1  •  sertraline (ZOLOFT) 50 mg tablet, Take 1 tablet (50 mg total) by mouth daily, Disp: 30 tablet, Rfl: 0  •  traMADol (ULTRAM) 50 mg tablet, Take 1 tablet (50 mg total) by mouth every 8 (eight) hours as needed for moderate pain (Patient not taking: Reported on 6/13/2023), Disp: 9 tablet, Rfl: 0  Allergies   Allergen Reactions   • Amlodipine Edema   • Latex      Added based on information entered during log entry, please review and add reactions, type, and severity as needed   • Lisinopril Cough   • Pollen Extract       There were no vitals filed for this visit.

## 2023-07-11 NOTE — LETTER
2023     Rad Paz MD  [address] [facility] [address]  [address] [place] [address]    Patient: Lacey Sandoval   YOB: 1960   Date of Visit: 2023       Dear Dr. Schumacher: Thank you for referring Zeyad Garcia to me for evaluation. Below are my notes for this consultation. If you have questions, please do not hesitate to call me. I look forward to following your patient along with you. Sincerely,        Justa Adam MD        CC: No Recipients    Justa Adam MD  2023 10:04 AM  Sign when Signing Visit  Consultation - Radiation Oncology      OQN:[id number] : 1960  Encounter: [id number]  Patient Information: [address]  Chief Complaint   Patient presents with   • Consult     Cancer Staging   Malignant neoplasm of lower-inner quadrant of right breast of female, estrogen receptor positive ([address])  Staging form: Breast, AJCC 8th Edition  - Clinical stage from 2023: Stage IIA (cT2, cN0, cM0, G3, ER+, VT+, HER2-) - Signed by Rad Paz MD on 2023  Stage prefix: Initial diagnosis  Method of lymph node assessment: Clinical  Histologic grading system: 3 grade system  - Pathologic stage from 2023: Stage IB (pT2, pN0(sn), cM0, G3, ER+, VT+, HER2-) - Signed by Rad Paz MD on 2023  Stage prefix: Initial diagnosis  Method of lymph node assessment: [place] lymph node biopsy  Histologic grading system: 3 grade system           History of Present Illness     Lacey Sandoval 1960 is a 59 y.o. female Presents in consult for discussion of RT for recently diagnosed ER/VT+ HER2-, Stage IB right breast cancer. Referred by Dr. Sindy Guillermo. Presented for routine screening mammogram with results revealing 2 separate masses in the right breast.  Ultimately breast biopsy completed with results revealing invasive ductal carcinoma. 23  Dr. Schumacher  Pathology reviewed. Good candidate for breast conservation. Declining genetic screening. Referred to smoking cessation.      3/28/23  Tissue Exam       INVASIVE CARCINOMA OF THE BREAST: Resection  8th Edition - Protocol posted: 9/21/2022  INVASIVE CARCINOMA OF THE BREAST: COMPLETE EXCISION - All Specimens       SPECIMEN   Procedure   Excision (less than total mastectomy)    Specimen Laterality   Right    TUMOR   Tumor Site   Clock position        3 o'clock    Tumor Site   Distance from nipple (Centimeters): 5 cm   Histologic Type   Invasive carcinoma of no special type (ductal)    Histologic Grade (Herberth Histologic Score)       Glandular (Acinar) / Tubular Differentiation   Score 3    Nuclear Pleomorphism   Score 3    Mitotic Rate   Score 3    Overall Grade   Grade 3 (scores of 8 or 9)    Tumor Size   Greatest dimension of largest invasive focus (Millimeters): 30 mm   Additional Dimension (Millimeters)   23 mm       16 mm   Tumor Focality   Single focus of invasive carcinoma    Ductal Carcinoma In Situ (DCIS)   Present        Positive for extensive intraductal component (EIC)    Size (Extent) of DCIS   Estimated size (extent) of DCIS is at least (Millimeters): 15 mm        Number of Blocks with DCIS   3    Number of Blocks Examined   14    Architectural Patterns   [place]        Cribriform        Solid    Nuclear Grade   Grade III (high)    Necrosis   Present, central (expansive "comedo" necrosis)    Lobular Carcinoma In Situ (LCIS)   Not identified    Lymphovascular Invasion   Not identified    Dermal Lymphovascular Invasion   Not identified    Microcalcifications   Present in DCIS        Present in invasive carcinoma    Treatment Effect in the Breast   No known presurgical therapy    MARGINS   Margin Status for Invasive Carcinoma   All margins negative for invasive carcinoma    Distance from Invasive Carcinoma to Closest Margin   5 mm   Closest Margin(s) to Invasive Carcinoma   Anterior    Margin Status for DCIS   All margins negative for DCIS    Distance from DCIS to Closest Margin   4 mm Closest Margin(s) to DCIS   Medial    Margin Comment   Please see parts B for status of additional medial/superior margin excised    REGIONAL LYMPH NODES   Regional Lymph Node Status   All regional lymph nodes negative for tumor    Total Number of Lymph Nodes Examined (sentinel and non-sentinel)   3    Number of [place] Nodes Examined   3    Regional Lymph Node Comment   AE1/AE3 cytokeratin immunostain ordered on block C1. ADDENDUM TO FOLLOW    PATHOLOGIC STAGE CLASSIFICATION (pTNM, AJCC 8th Edition)   Reporting of pT, pN, and (when applicable) pM categories is based on information available to the pathologist at the time the report is issued. As per the AJCC (Chapter 1, 8th Ed.) it is the managing physician's responsibility to establish the final pathologic stage based upon all pertinent information, including but potentially not limited to this pathology report. pT Category   pT2    Regional Lymph Nodes Modifier   (sn): [place] node(s) evaluated. pN Category   pN not assigned (cannot be determined based on available pathological information)    ADDITIONAL FINDINGS   Additional Findings   AE1/AE3 cytokeratin immunostain ordered on block C1. ADDENDUM TO FOLLOW            Estrogen Receptor (ER) Status   Positive (greater than 10% of cells demonstrate nuclear positivity)    Percentage of Cells with Nuclear Positivity   90-95 %           Progesterone Receptor (PgR) Status   Positive    Percentage of Cells with Nuclear Positivity   15-25 %           HER2 (by immunohistochemistry)   Negative (Score 1+)            Ki-67 Percentage of Positive Nuclei   25 %   Testing Performed on Case Number   [id number]    Comment(s)   Receptor studies will be repeated for the poorly differentiated carcinoma. ADDENDUM TO FOLLOW    . Gross Description         4/17/23  Dr. Shweta Chery  Daughter completed genetic testing with negative result. After lengthy discussion, has decided on Taxotere and cyclophosphamide.      5/3/23   Cecilia Roberts  Seen for draining incision. Small opening noted in central portion of incision. Wound cleansed and steri strips applied     5/15/23  Dr. Blount Staff  Had 1 dose of adjuvant chemotherapy with Taxotere and cyclophosphamide with poor tolerance. She developed significant nausea and vomiting as well as profound fatigue. Has decided not to have any further adjuvant chemotherapy. Recommend adjuvant hormonal therapy with anastrozole. Referred to radiation oncology. F/U in 6 months     6/13/23  Dr. Cecilia Roberts  Has been seen for ongoing wound checks. No erythema or signs of infection. Will continue with conservative wound management. 6/20/23 Dr. Cecilia Roberts  1 small residual opening in central portion of lumpectomy scar. Wound is clean without s/s of infection, was reapproximated in office. Keflex ordered prophylactic. F/u in 1.5 weeks. 6/29/23  Dr. Cecilia Roberts  Incision intact, without s/s of infection. Sutures removed. Steri-stirps applied. Advised to reschedule appointment with radiation oncology.      Upcoming:  10/17/23  Surgical Oncology  11/16/23   Medical Oncology                  Oncology History   Malignant neoplasm of lower-inner quadrant of right breast of female, estrogen receptor positive ([address])   2/22/2023 Initial Diagnosis     Malignant neoplasm of lower-inner quadrant of right breast of female, estrogen receptor positive ([address])      2/22/2023 -  Cancer Staged     Staging form: Breast, AJCC 8th Edition  - Clinical stage from 2/22/2023: Stage IIA (cT2, cN0, cM0, G3, ER+, HI+, HER2-) - Signed by Valeria Rivera MD on 2/22/2023  Stage prefix: Initial diagnosis  Method of lymph node assessment: Clinical  Histologic grading system: 3 grade system         4/17/2023 -  Cancer Staged     Staging form: Breast, AJCC 8th Edition  - Pathologic stage from 4/17/2023: Stage IB (pT2, pN0(sn), cM0, G3, ER+, HI+, HER2-) - Signed by Valeria Rivera MD on 4/17/2023  Stage prefix: Initial diagnosis  Method of lymph node assessment: [place] lymph node biopsy  Histologic grading system: 3 grade system         2023 - 2023 Chemotherapy     Pegfilgrastim-bmez (ZIEXTENZO), 6 mg, Subcutaneous, Once, 1 of 4 cycles  Administration: 6 mg (2023)  cyclophosphamide (CYTOXAN) IVPB, 600 mg/m2 = 1,170 mg, Intravenous, Once, 1 of 4 cycles  Administration: 1,170 mg (2023)  DOCEtaxel (TAXOTERE) chemo infusion, 75 mg/m2 = 146.2 mg, Intravenous, Once, 1 of 4 cycles  Administration: 146 mg (2023)            Review of Systems:  Review of Systems   Constitutional: Negative. Negative for unexpected weight change. HENT: Negative. Eyes: Negative. Glasses   Respiratory: Negative. Needs albuterol with acute URI   Cardiovascular: Negative for leg swelling. Gastrointestinal: Negative. Endocrine: Positive for heat intolerance. Genitourinary: Positive for frequency. Negative for difficulty urinating. Musculoskeletal: Positive for back pain. C/o itching in nipple   Skin: Positive for wound (breast incision was opened). Allergic/Immunologic: Positive for environmental allergies. Negative for food allergies. Neurological: Positive for numbness. Light-headedness: incisional numbness. Hematological: Negative. Psychiatric/Behavioral: Positive for sleep disturbance (wakes frequently). Clinical Trial: no     OB/GYN History:  The patient underwent menarche at 16 years  Menopause Status Post  No LMP recorded. Patient is postmenopausal.  Menopause at 54} years. Menopause Reason natural  Hormone replacement therapy: no.  Years used 0   1   Para 1   Age at first delivery being 22 years. Nursing: no.   Birth control pills: yes.   Years used < 1 month     Pregnancy test needed:  no     ONCOTYPE/MAMMOPRINT results     Prior Radiation: none     Teaching  [facility] radiation oncology booklet given     MST completed     Implantable Devices (Port, Pacemaker, pain stimulator): none [unfilled]       Health Maintenance   Topic Date Due   • Pneumococcal Vaccine: Pediatrics (0 to 5 Years) and At-Risk Patients (6 to 59 Years) (1 - PCV) Never done   • HIV Screening  Never done   • Annual Physical  Never done   • DTaP,Tdap,and Td Vaccines (1 - Tdap) Never done   • Cervical Cancer Screening  Never done   • Colorectal Cancer Screening  Never done   • Osteoporosis Screening  Never done   • Lung Cancer Screening  Never done   • COVID-19 Vaccine (2 - Moderna risk series) 09/27/2021   • Depression Remission PHQ  07/06/2023   • Influenza Vaccine (1) 09/01/2023   • Breast Cancer Screening: Mammogram  01/17/2024   • BMI: Followup Plan  03/07/2024   • BMI: Adult  06/29/2024   • Hepatitis C Screening  Completed   • HIB Vaccine  Aged Out   • IPV Vaccine  Aged Out   • Hepatitis A Vaccine  Aged Out   • Meningococcal ACWY Vaccine  Aged Out   • HPV Vaccine  Aged Out      Medical History        Past Medical History:   Diagnosis Date   • Anxiety     • Arthritis       right foot/ back./ hands   • Cellulitis 09/13/2019   • COVID       x 2--3/7/22 and approx Sept 2022.  recovered @ home   • Heartburn     • Hypertension     • Hypokalemia 04/01/2020   • Pneumonia of both lungs due to infectious organism 08/19/2019     pt denies   • Tobacco abuse     • Wears dentures       full uppers   • Wears glasses           Surgical History         Past Surgical History:   Procedure Laterality Date   • APPENDECTOMY         APPENDISITIS SURGERY AGE 19   • BREAST BIOPSY Right 01/31/2023   • BREAST LUMPECTOMY Right 3/28/2023     Procedure: LUMPECTOMY BREAST MARY  LOCALIZED;  Surgeon: Sharlene Moritz, MD;  Location: [facility];  Service: Surgical Oncology   • LYMPH NODE BIOPSY Right 3/28/2023     Procedure: BIOPSY LYMPH NODE, SENTINEL LYMPHOSCINTIGRAPHY, LYMPHATIC MAPPING;  Surgeon: Sharlene Moritz, MD;  Location: [facility];  Service: Surgical Oncology   • US GUIDED BREAST BIOPSY RIGHT COMPLETE Right 01/31/2023         Family History Family History   Problem Relation Age of Onset   • COPD Mother     • No Known Problems Father     • No Known Problems Brother     • No Known Problems Brother     • No Known Problems Brother     • No Known Problems Daughter     • No Known Problems Maternal Aunt     • Lung cancer Maternal Uncle           age at dx unk   • Ovarian cancer Maternal Grandmother           age at dx unk   • Prostate cancer Maternal Grandfather           age at dx unk   • No Known Problems Paternal Grandmother     • No Known Problems Paternal Grandfather     • Breast cancer Neg Hx     • Colon cancer Neg Hx           Social History            Tobacco Use   • Smoking status: Every Day       Packs/day: 0.50       Years: 45.00       Total pack years: 22.50       Types: Cigarettes       Passive exposure: Never   • Smokeless tobacco: Never   • Tobacco comments:       CUT DOWN TO 2-3 PER DAY - 5/15/2023   Vaping Use   • Vaping Use: Never used   Substance Use Topics   • Alcohol use: Not Currently       Alcohol/week: 7.0 standard drinks of alcohol       Types: 7 Glasses of wine per week       Comment: quit 2 years ago Oct 2020   • Drug use: Not Currently         Current Outpatient Medications:   •  acetaminophen (TYLENOL) 650 mg CR tablet, Take 1 tablet (650 mg total) by mouth every 8 (eight) hours as needed for mild pain, Disp: 30 tablet, Rfl: 3  •  albuterol (Ventolin HFA) 90 mcg/act inhaler, Inhale 2 puffs every 6 (six) hours as needed for wheezing, Disp: 18 g, Rfl: 1  •  anastrozole (ARIMIDEX) 1 mg tablet, Take 1 tablet (1 mg total) by mouth daily, Disp: 90 tablet, Rfl: 1  •  atorvastatin (LIPITOR) 10 mg tablet, Take 1 tablet (10 mg total) by mouth daily, Disp: 90 tablet, Rfl: 1  •  buPROPion (Wellbutrin XL) 150 mg 24 hr tablet, Take 1 tablet (150 mg total) by mouth every morning, Disp: 90 tablet, Rfl: 1  •  hydrochlorothiazide (HYDRODIURIL) 12.5 mg tablet, Take 1 tablet (12.5 mg total) by mouth daily, Disp: 90 tablet, Rfl: 1  • ibuprofen (MOTRIN) 200 mg tablet, Take 200-800 mg by mouth every 6 (six) hours as needed for mild pain, Disp: , Rfl:   •  irbesartan (AVAPRO) 300 mg tablet, Take 1 tablet (300 mg total) by mouth daily at bedtime, Disp: 90 tablet, Rfl: 1  •  ondansetron (ZOFRAN) 4 mg tablet, Take 1 tablet (4 mg total) by mouth every 8 (eight) hours as needed for nausea or vomiting, Disp: 30 tablet, Rfl: 1  •  sertraline (ZOLOFT) 50 mg tablet, Take 1 tablet (50 mg total) by mouth daily, Disp: 30 tablet, Rfl: 0  •  traMADol (ULTRAM) 50 mg tablet, Take 1 tablet (50 mg total) by mouth every 8 (eight) hours as needed for moderate pain (Patient not taking: Reported on 6/13/2023), Disp: 9 tablet, Rfl: 0        Allergies   Allergen Reactions   • Amlodipine Edema   • Latex         Added based on information entered during log entry, please review and add reactions, type, and severity as needed   • Lisinopril Cough   • Pollen Extract        Vitals   There were no vitals filed for this visit.                                 Historical Information   Oncology History   Malignant neoplasm of lower-inner quadrant of right breast of female, estrogen receptor positive ([address])   2/22/2023 Initial Diagnosis    Malignant neoplasm of lower-inner quadrant of right breast of female, estrogen receptor positive ([address])     2/22/2023 -  Cancer Staged    Staging form: Breast, AJCC 8th Edition  - Clinical stage from 2/22/2023: Stage IIA (cT2, cN0, cM0, G3, ER+, HI+, HER2-) - Signed by Shira Bright MD on 2/22/2023  Stage prefix: Initial diagnosis  Method of lymph node assessment: Clinical  Histologic grading system: 3 grade system       4/17/2023 -  Cancer Staged    Staging form: Breast, AJCC 8th Edition  - Pathologic stage from 4/17/2023: Stage IB (pT2, pN0(sn), cM0, G3, ER+, HI+, HER2-) - Signed by Shira Bright MD on 4/17/2023  Stage prefix: Initial diagnosis  Method of lymph node assessment: [place] lymph node biopsy  Histologic grading system: 3 grade system       5/1/2023 - 5/2/2023 Chemotherapy    Pegfilgrastim-bmez (ZIEXTENZO), 6 mg, Subcutaneous, Once, 1 of 4 cycles  Administration: 6 mg (5/2/2023)  cyclophosphamide (CYTOXAN) IVPB, 600 mg/m2 = 1,170 mg, Intravenous, Once, 1 of 4 cycles  Administration: 1,170 mg (5/1/2023)  DOCEtaxel (TAXOTERE) chemo infusion, 75 mg/m2 = 146.2 mg, Intravenous, Once, 1 of 4 cycles  Administration: 146 mg (5/1/2023)           Past Medical History:   Diagnosis Date   • Anxiety    • Arthritis     right foot/ back./ hands   • Breast cancer ([address])    • Cellulitis 09/13/2019   • COVID     x 2--3/7/22 and approx Sept 2022.  recovered @ home   • Heartburn    • Hypertension    • Hypokalemia 04/01/2020   • Pneumonia of both lungs due to infectious organism 08/19/2019    pt denies   • Tobacco abuse    • Wears dentures     full uppers   • Wears glasses      Past Surgical History:   Procedure Laterality Date   • APPENDECTOMY      APPENDISITIS SURGERY AGE 19   • BREAST BIOPSY Right 01/31/2023   • BREAST LUMPECTOMY Right 3/28/2023    Procedure: LUMPECTOMY BREAST MARY  LOCALIZED;  Surgeon: Tatum Gonzalez MD;  [facility];  Service: Surgical Oncology   • LYMPH NODE BIOPSY Right 3/28/2023    Procedure: BIOPSY LYMPH NODE, SENTINEL LYMPHOSCINTIGRAPHY, LYMPHATIC MAPPING;  Surgeon: Tatum Gonzalez MD;  [facility];  Service: Surgical Oncology   • US GUIDED BREAST BIOPSY RIGHT COMPLETE Right 01/31/2023       Family History   Problem Relation Age of Onset   • COPD Mother    • No Known Problems Father    • No Known Problems Brother    • No Known Problems Brother    • No Known Problems Brother    • No Known Problems Daughter    • No Known Problems Maternal Aunt    • Lung cancer Maternal Uncle         age at dx unk   • Ovarian cancer Maternal Grandmother         age at dx unk   • Prostate cancer Maternal Grandfather         age at dx unk   • No Known Problems Paternal Grandmother    • No Known Problems Paternal Grandfather    • Breast cancer Neg Hx    • Colon cancer Neg Hx        Social History   Social History     Substance and Sexual Activity   Alcohol Use Not Currently   • Alcohol/week: 7.0 standard drinks of alcohol   • Types: 7 Glasses of wine per week    Comment: quit 2 years ago Oct 2020     Social History     Substance and Sexual Activity   Drug Use Never     Social History     Tobacco Use   Smoking Status Every Day   • Packs/day: 0.50   • Years: 45.00   • Total pack years: 22.50   • Types: Cigarettes   • Passive exposure: Never   Smokeless Tobacco Never   Tobacco Comments    States she is smoking 5 cig/day         Meds/Allergies     Current Outpatient Medications:   •  acetaminophen (TYLENOL) 650 mg CR tablet, Take 1 tablet (650 mg total) by mouth every 8 (eight) hours as needed for mild pain, Disp: 30 tablet, Rfl: 3  •  albuterol (Ventolin HFA) 90 mcg/act inhaler, Inhale 2 puffs every 6 (six) hours as needed for wheezing, Disp: 18 g, Rfl: 1  •  anastrozole (ARIMIDEX) 1 mg tablet, Take 1 tablet (1 mg total) by mouth daily, Disp: 90 tablet, Rfl: 1  •  atorvastatin (LIPITOR) 10 mg tablet, Take 1 tablet (10 mg total) by mouth daily, Disp: 90 tablet, Rfl: 1  •  buPROPion (Wellbutrin XL) 150 mg 24 hr tablet, Take 1 tablet (150 mg total) by mouth every morning, Disp: 90 tablet, Rfl: 1  •  ibuprofen (MOTRIN) 200 mg tablet, Take 200-800 mg by mouth every 6 (six) hours as needed for mild pain, Disp: , Rfl:   •  irbesartan (AVAPRO) 300 mg tablet, Take 1 tablet (300 mg total) by mouth daily at bedtime, Disp: 90 tablet, Rfl: 1  •  sertraline (ZOLOFT) 50 mg tablet, Take 1 tablet (50 mg total) by mouth daily, Disp: 30 tablet, Rfl: 0  •  hydrochlorothiazide (HYDRODIURIL) 12.5 mg tablet, Take 1 tablet (12.5 mg total) by mouth daily (Patient not taking: Reported on 7/11/2023), Disp: 90 tablet, Rfl: 1  •  ondansetron (ZOFRAN) 4 mg tablet, Take 1 tablet (4 mg total) by mouth every 8 (eight) hours as needed for nausea or vomiting (Patient not taking: Reported on 2023), Disp: 30 tablet, Rfl: 1  •  traMADol (ULTRAM) 50 mg tablet, Take 1 tablet (50 mg total) by mouth every 8 (eight) hours as needed for moderate pain (Patient not taking: Reported on 2023), Disp: 9 tablet, Rfl: 0  Allergies   Allergen Reactions   • Amlodipine Edema   • Latex      Added based on information entered during log entry, please review and add reactions, type, and severity as needed   • Lisinopril Cough   • Pollen Extract          Review of Systems   Constitutional: Negative. Negative for unexpected weight change. HENT: Negative. Eyes: Negative. Glasses   Respiratory: Negative. Needs albuterol with acute URI   Cardiovascular: Negative for leg swelling. Gastrointestinal: Negative. Endocrine: Positive for heat intolerance. Genitourinary: Positive for frequency. Negative for difficulty urinating. Musculoskeletal: Positive for back pain. C/o itching in nipple   Skin: Positive for wound (breast incision was opened). Allergic/Immunologic: Positive for environmental allergies. Negative for food allergies. Neurological: Positive for numbness. Light-headedness: incisional numbness. Hematological: Negative. Psychiatric/Behavioral: Positive for sleep disturbance (wakes frequently). Clinical Trial: no     OB/GYN History:  The patient underwent menarche at 16 years  Menopause Status Post  No LMP recorded. Patient is postmenopausal.  Menopause at 54} years. Menopause Reason natural  Hormone replacement therapy: no.  Years used 0   1   Para 1   Age at first delivery being 22 years. Nursing: no.   Birth control pills: yes.   Years used < 1 month      OBJECTIVE:   /82 (BP Location: Left arm, Patient Position: Sitting, Cuff Size: Large)   Pulse 78   Temp 97.5 °F (36.4 °C) (Temporal)   Ht 5' 3" (1.6 m)   Wt 97.8 kg (215 lb 9.8 oz)   SpO2 95%   BMI 38.19 kg/m²   Pain Assessment:  0  Performance Status: ECOG/[place]/WHO: 0 - Asymptomatic    Physical Exam   There is no cervical or axillary adenopathy palpable she has an open wound in the right upper inner quadrant at the lumpectomy site. This measures approximately half inch deep. The base of the wound appears moist.  There is no bleeding or significant discharge associated with this on palpation. No suspicious lesions palpable in either breast no arm edema. Abdomen obese soft nontender. Her breathing is unlabored. Conversing appropriately. Ambulating independently. RESULTS  Lab Results    Chemistry        Component Value Date/Time    K 4.8 04/27/2023 0855     04/27/2023 0855    CO2 26 04/27/2023 0855    BUN 16 04/27/2023 0855    CREATININE 1.06 04/27/2023 0855        Component Value Date/Time    CALCIUM 9.7 04/27/2023 0855    ALKPHOS 115 04/27/2023 0855    AST 19 04/27/2023 0855    ALT 27 04/27/2023 0855            Lab Results   Component Value Date    WBC 6.08 04/27/2023    HGB 12.2 04/27/2023    HCT 38.2 04/27/2023    MCV 91 04/27/2023     04/27/2023         Imaging Studies  No results found. ASSESSMENT  1.  Malignant neoplasm of lower-inner quadrant of right breast of female, estrogen receptor positive ([address])  Ambulatory referral to Radiation Oncology        Cancer Staging   Malignant neoplasm of lower-inner quadrant of right breast of female, estrogen receptor positive ([address])  Staging form: Breast, AJCC 8th Edition  - Clinical stage from 2/22/2023: Stage IIA (cT2, cN0, cM0, G3, ER+, OK+, HER2-) - Signed by Kirill Owen MD on 2/22/2023  Stage prefix: Initial diagnosis  Method of lymph node assessment: Clinical  Histologic grading system: 3 grade system  - Pathologic stage from 4/17/2023: Stage IB (pT2, pN0(sn), cM0, G3, ER+, OK+, HER2-) - Signed by Kirill Owen MD on 4/17/2023  Stage prefix: Initial diagnosis  Method of lymph node assessment: [place] lymph node biopsy  Histologic grading system: 3 grade system        PLAN/DISCUSSION  No orders of the defined types were placed in this encounter. Bethany Rosario is a 59y.o. year old female with T2N0 right breast carcinoma status post breast conservation surgery. Her tumor is ER/ME positive HER2 negative. The tumor is grade 3 with negative margins. She did have a component of DCIS with EIC. She received 1 cycle of chemotherapy however had significant side effects and declined any further chemotherapy. She has been initiated on adjuvant hormonal therapy. States that while receiving chemotherapy she had significant cough and had opening of her incision. Last month she was seen by Dr. Dee Haas and sutures were placed with Steri-Strips overlying. Today she has wound which is not closed and without signs of infection. We discussed that this needs to heal prior to initiating radiation. We will contact Dr. [facility] Hinoj[facility] office to see if she be referred to wound care. I have reviewed with the patient the course of adjuvant radiation therapy directed to the right breast.  Discussed radiation in reducing her risk of recurrence. Her plan will utilize a hypofractionated regimen delivering a dose of 4005 cGy to the right breast over 15 treatments. 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 We reviewed possible side effects with acute and long-term. This can include but not limited to fatigue, skin erythema, hyperpigmentation, desquamation, pneumonitis, breast fibrosis, edema. She is agreeable to the above outlined plan. She will return for simulation once her wound is closed. Mary Bauer MD  7/11/2023,9:28 AM      Portions of the record may have been created with voice recognition software. Occasional wrong word or "sound a like" substitutions may have occurred due to the inherent limitations of voice recognition software. Read the chart carefully and recognize, using context, where substitutions have occurred.

## 2023-07-11 NOTE — PROGRESS NOTES
Consultation - Radiation Oncology      FUC:[id number] : 1960  Encounter: [id number]  Patient Information: Jorge Carrillo      CHIEF COMPLAINT  Chief Complaint   Patient presents with   • Consult     Cancer Staging   Malignant neoplasm of lower-inner quadrant of right breast of female, estrogen receptor positive ([address])  Staging form: Breast, AJCC 8th Edition  - Clinical stage from 2023: Stage IIA (cT2, cN0, cM0, G3, ER+, DC+, HER2-) - Signed by Guillermina Tate MD on 2023  Stage prefix: Initial diagnosis  Method of lymph node assessment: Clinical  Histologic grading system: 3 grade system  - Pathologic stage from 2023: Stage IB (pT2, pN0(sn), cM0, G3, ER+, DC+, HER2-) - Signed by Guillermina Tate MD on 2023  Stage prefix: Initial diagnosis  Method of lymph node assessment: [place] lymph node biopsy  Histologic grading system: 3 grade system           History of Present Illness     Jorge Carrillo 1960 is a 59 y.o. female Presents in consult for discussion of RT for recently diagnosed ER/DC+ HER2-, Stage IB right breast cancer. Referred by Dr. Cruzito Villalba. Presented for routine screening mammogram with results revealing 2 separate masses in the right breast.  Ultimately breast biopsy completed with results revealing invasive ductal carcinoma.     23  Dr. Peter Wheatley  Pathology reviewed. Good candidate for breast conservation. Declining genetic screening.   Referred to smoking cessation.     3/28/23  Tissue Exam       INVASIVE CARCINOMA OF THE BREAST: Resection  8th Edition - Protocol posted: 2022  INVASIVE CARCINOMA OF THE BREAST: COMPLETE EXCISION - All Specimens       SPECIMEN   Procedure   Excision (less than total mastectomy)    Specimen Laterality   Right    TUMOR   Tumor Site   Clock position        3 o'clock    Tumor Site   Distance from nipple (Centimeters): 5 cm   Histologic Type   Invasive carcinoma of no special type (ductal)    Histologic Grade (Herberth Histologic Score)       Glandular (Acinar) / Tubular Differentiation   Score 3    Nuclear Pleomorphism   Score 3    Mitotic Rate   Score 3    Overall Grade   Grade 3 (scores of 8 or 9)    Tumor Size   Greatest dimension of largest invasive focus (Millimeters): 30 mm   Additional Dimension (Millimeters)   23 mm       16 mm   Tumor Focality   Single focus of invasive carcinoma    Ductal Carcinoma In Situ (DCIS)   Present        Positive for extensive intraductal component (EIC)    Size (Extent) of DCIS   Estimated size (extent) of DCIS is at least (Millimeters): 15 mm        Number of Blocks with DCIS   3    Number of Blocks Examined   14    Architectural Patterns   Comedo        Cribriform        Solid    Nuclear Grade   Grade III (high)    Necrosis   Present, central (expansive "comedo" necrosis)    Lobular Carcinoma In Situ (LCIS)   Not identified    Lymphovascular Invasion   Not identified    Dermal Lymphovascular Invasion   Not identified    Microcalcifications   Present in DCIS        Present in invasive carcinoma    Treatment Effect in the Breast   No known presurgical therapy    MARGINS   Margin Status for Invasive Carcinoma   All margins negative for invasive carcinoma    Distance from Invasive Carcinoma to Closest Margin   5 mm   Closest Margin(s) to Invasive Carcinoma   Anterior    Margin Status for DCIS   All margins negative for DCIS    Distance from DCIS to Closest Margin   4 mm   Closest Margin(s) to DCIS   Medial    Margin Comment   Please see parts B for status of additional medial/superior margin excised    REGIONAL LYMPH NODES   Regional Lymph Node Status   All regional lymph nodes negative for tumor    Total Number of Lymph Nodes Examined (sentinel and non-sentinel)   3    Number of [place] Nodes Examined   3    Regional Lymph Node Comment   AE1/AE3 cytokeratin immunostain ordered on block C1. ADDENDUM TO FOLLOW    PATHOLOGIC STAGE CLASSIFICATION (pTNM, AJCC 8th Edition)   Reporting of pT, pN, and (when applicable) pM categories is based on information available to the pathologist at the time the report is issued. As per the AJCC (Chapter 1, 8th Ed.) it is the managing physician's responsibility to establish the final pathologic stage based upon all pertinent information, including but potentially not limited to this pathology report. pT Category   pT2    Regional Lymph Nodes Modifier   (sn): [place] node(s) evaluated. pN Category   pN not assigned (cannot be determined based on available pathological information)    ADDITIONAL FINDINGS   Additional Findings   AE1/AE3 cytokeratin immunostain ordered on block C1. ADDENDUM TO FOLLOW            Estrogen Receptor (ER) Status   Positive (greater than 10% of cells demonstrate nuclear positivity)    Percentage of Cells with Nuclear Positivity   90-95 %           Progesterone Receptor (PgR) Status   Positive    Percentage of Cells with Nuclear Positivity   15-25 %           HER2 (by immunohistochemistry)   Negative (Score 1+)            Ki-67 Percentage of Positive Nuclei   25 %   Testing Performed on Case Number   [id number]    Comment(s)   Receptor studies will be repeated for the poorly differentiated carcinoma. ADDENDUM TO FOLLOW    . Gross Description         4/17/23  Dr. Michel Precise  Daughter completed genetic testing with negative result. After lengthy discussion, has decided on Taxotere and cyclophosphamide.     5/3/23  Dr. Se Medel  Seen for draining incision. Small opening noted in central portion of incision. Wound cleansed and steri strips applied     5/15/23  Dr. Burch [facility]  Had 1 dose of adjuvant chemotherapy with Taxotere and cyclophosphamide with poor tolerance.  She developed significant nausea and vomiting as well as profound fatigue.  Has decided not to have any further adjuvant chemotherapy. Recommend adjuvant hormonal therapy with anastrozole. Referred to radiation oncology. F/U in 6 months     6/13/23  Dr. Se Medel  Has been seen for ongoing wound checks.   No erythema or signs of infection. Will continue with conservative wound management.       6/20/23 Dr. Анна Narvaez  1 small residual opening in central portion of lumpectomy scar. Wound is clean without s/s of infection, was reapproximated in office. Keflex ordered prophylactic. F/u in 1.5 weeks.      6/29/23  Dr. Анна Narvaez  Incision intact, without s/s of infection. Sutures removed. Steri-stirps applied. Advised to reschedule appointment with radiation oncology.     Upcoming:  10/17/23  Surgical Oncology  11/16/23   Medical Oncology                  Oncology History   Malignant neoplasm of lower-inner quadrant of right breast of female, estrogen receptor positive ([address])   2/22/2023 Initial Diagnosis     Malignant neoplasm of lower-inner quadrant of right breast of female, estrogen receptor positive ([address])      2/22/2023 -  Cancer Staged     Staging form: Breast, AJCC 8th Edition  - Clinical stage from 2/22/2023: Stage IIA (cT2, cN0, cM0, G3, ER+, TN+, HER2-) - Signed by Elsa Scanlon MD on 2/22/2023  Stage prefix: Initial diagnosis  Method of lymph node assessment: Clinical  Histologic grading system: 3 grade system         4/17/2023 -  Cancer Staged     Staging form: Breast, AJCC 8th Edition  - Pathologic stage from 4/17/2023: Stage IB (pT2, pN0(sn), cM0, G3, ER+, TN+, HER2-) - Signed by Elsa Scanlon MD on 4/17/2023  Stage prefix: Initial diagnosis  Method of lymph node assessment: [place] lymph node biopsy  Histologic grading system: 3 grade system         5/1/2023 - 5/2/2023 Chemotherapy     Pegfilgrastim-bmez (ZIEXTENZO), 6 mg, Subcutaneous, Once, 1 of 4 cycles  Administration: 6 mg (5/2/2023)  cyclophosphamide (CYTOXAN) IVPB, 600 mg/m2 = 1,170 mg, Intravenous, Once, 1 of 4 cycles  Administration: 1,170 mg (5/1/2023)  DOCEtaxel (TAXOTERE) chemo infusion, 75 mg/m2 = 146.2 mg, Intravenous, Once, 1 of 4 cycles  Administration: 146 mg (5/1/2023)            Review of Systems:  Review of Systems   Constitutional: Negative. Negative for unexpected weight change. HENT: Negative. Eyes: Negative. Glasses   Respiratory: Negative. Needs albuterol with acute URI   Cardiovascular: Negative for leg swelling. Gastrointestinal: Negative. Endocrine: Positive for heat intolerance. Genitourinary: Positive for frequency. Negative for difficulty urinating. Musculoskeletal: Positive for back pain. C/o itching in nipple   Skin: Positive for wound (breast incision was opened). Allergic/Immunologic: Positive for environmental allergies. Negative for food allergies. Neurological: Positive for numbness. Light-headedness: incisional numbness. Hematological: Negative. Psychiatric/Behavioral: Positive for sleep disturbance (wakes frequently).       Clinical Trial: no     OB/GYN History:  The patient underwent menarche at 16 years  Menopause Status Post  No LMP recorded. Patient is postmenopausal.  Menopause at 54} years. Menopause Reason natural  Hormone replacement therapy: no.  Years used 0   1   Para 1   Age at first delivery being 22 years. Nursing: no.   Birth control pills: yes.   Years used < 1 month     Pregnancy test needed:  no     ONCOTYPE/MAMMOPRINT results     Prior Radiation: none     Teaching  [facility] radiation oncology booklet given     MST completed     Implantable Devices (Port, Pacemaker, pain stimulator): none              [unfilled]       Health Maintenance   Topic Date Due   • Pneumococcal Vaccine: Pediatrics (0 to 5 Years) and At-Risk Patients (6 to 59 Years) (1 - PCV) Never done   • HIV Screening  Never done   • Annual Physical  Never done   • DTaP,Tdap,and Td Vaccines (1 - Tdap) Never done   • Cervical Cancer Screening  Never done   • Colorectal Cancer Screening  Never done   • Osteoporosis Screening  Never done   • Lung Cancer Screening  Never done   • COVID-19 Vaccine (2 - Moderna risk series) 2021   • Depression Remission PHQ  2023   • Influenza Vaccine (1) 09/01/2023   • Breast Cancer Screening: Mammogram  01/17/2024   • BMI: Followup Plan  03/07/2024   • BMI: Adult  06/29/2024   • Hepatitis C Screening  Completed   • HIB Vaccine  Aged Out   • IPV Vaccine  Aged Out   • Hepatitis A Vaccine  Aged Out   • Meningococcal ACWY Vaccine  Aged Out   • HPV Vaccine  Aged Out      Medical History        Past Medical History:   Diagnosis Date   • Anxiety     • Arthritis       right foot/ back./ hands   • Cellulitis 09/13/2019   • COVID       x 2--3/7/22 and approx Sept 2022.  recovered @ home   • Heartburn     • Hypertension     • Hypokalemia 04/01/2020   • Pneumonia of both lungs due to infectious organism 08/19/2019     pt denies   • Tobacco abuse     • Wears dentures       full uppers   • Wears glasses           Surgical History         Past Surgical History:   Procedure Laterality Date   • APPENDECTOMY         APPENDISITIS SURGERY AGE 19   • BREAST BIOPSY Right 01/31/2023   • BREAST LUMPECTOMY Right 3/28/2023     Procedure: LUMPECTOMY BREAST MARY  LOCALIZED;  Surgeon: Janes Chao MD;  [facility];  Service: Surgical Oncology   • LYMPH NODE BIOPSY Right 3/28/2023     Procedure: BIOPSY LYMPH NODE, SENTINEL LYMPHOSCINTIGRAPHY, LYMPHATIC MAPPING;  Surgeon: Janes Chao MD;  Location[facility];  Service: Surgical Oncology   • US GUIDED BREAST BIOPSY RIGHT COMPLETE Right 01/31/2023         Family History         Family History   Problem Relation Age of Onset   • COPD Mother     • No Known Problems Father     • No Known Problems Brother     • No Known Problems Brother     • No Known Problems Brother     • No Known Problems Daughter     • No Known Problems Maternal Aunt     • Lung cancer Maternal Uncle           age at dx unk   • Ovarian cancer Maternal Grandmother           age at dx unk   • Prostate cancer Maternal Grandfather           age at dx unk   • No Known Problems Paternal Grandmother     • No Known Problems Paternal Grandfather     • Breast cancer Neg Hx     • Colon cancer Neg Hx           Social History            Tobacco Use   • Smoking status: Every Day       Packs/day: 0.50       Years: 45.00       Total pack years: 22.50       Types: Cigarettes       Passive exposure: Never   • Smokeless tobacco: Never   • Tobacco comments:       CUT DOWN TO 2-3 PER DAY - 5/15/2023   Vaping Use   • Vaping Use: Never used   Substance Use Topics   • Alcohol use: Not Currently       Alcohol/week: 7.0 standard drinks of alcohol       Types: 7 Glasses of wine per week       Comment: quit 2 years ago Oct 2020   • Drug use: Not Currently         Current Outpatient Medications:   •  acetaminophen (TYLENOL) 650 mg CR tablet, Take 1 tablet (650 mg total) by mouth every 8 (eight) hours as needed for mild pain, Disp: 30 tablet, Rfl: 3  •  albuterol (Ventolin HFA) 90 mcg/act inhaler, Inhale 2 puffs every 6 (six) hours as needed for wheezing, Disp: 18 g, Rfl: 1  •  anastrozole (ARIMIDEX) 1 mg tablet, Take 1 tablet (1 mg total) by mouth daily, Disp: 90 tablet, Rfl: 1  •  atorvastatin (LIPITOR) 10 mg tablet, Take 1 tablet (10 mg total) by mouth daily, Disp: 90 tablet, Rfl: 1  •  buPROPion (Wellbutrin XL) 150 mg 24 hr tablet, Take 1 tablet (150 mg total) by mouth every morning, Disp: 90 tablet, Rfl: 1  •  hydrochlorothiazide (HYDRODIURIL) 12.5 mg tablet, Take 1 tablet (12.5 mg total) by mouth daily, Disp: 90 tablet, Rfl: 1  •  ibuprofen (MOTRIN) 200 mg tablet, Take 200-800 mg by mouth every 6 (six) hours as needed for mild pain, Disp: , Rfl:   •  irbesartan (AVAPRO) 300 mg tablet, Take 1 tablet (300 mg total) by mouth daily at bedtime, Disp: 90 tablet, Rfl: 1  •  ondansetron (ZOFRAN) 4 mg tablet, Take 1 tablet (4 mg total) by mouth every 8 (eight) hours as needed for nausea or vomiting, Disp: 30 tablet, Rfl: 1  •  sertraline (ZOLOFT) 50 mg tablet, Take 1 tablet (50 mg total) by mouth daily, Disp: 30 tablet, Rfl: 0  •  traMADol (ULTRAM) 50 mg tablet, Take 1 tablet (50 mg total) by mouth every 8 (eight) hours as needed for moderate pain (Patient not taking: Reported on 6/13/2023), Disp: 9 tablet, Rfl: 0  Allergies   Allergen Reactions   • Amlodipine Edema   • Latex         Added based on information entered during log entry, please review and add reactions, type, and severity as needed   • Lisinopril Cough   • Pollen Extract        Vitals   There were no vitals filed for this visit.                                 Historical Information   Oncology History   Malignant neoplasm of lower-inner quadrant of right breast of female, estrogen receptor positive ([address])   2/22/2023 Initial Diagnosis    Malignant neoplasm of lower-inner quadrant of right breast of female, estrogen receptor positive ([address])     2/22/2023 -  Cancer Staged    Staging form: Breast, AJCC 8th Edition  - Clinical stage from 2/22/2023: Stage IIA (cT2, cN0, cM0, G3, ER+, [place]+, HER2-) - Signed by Elsa Scanlon MD on 2/22/2023  Stage prefix: Initial diagnosis  Method of lymph node assessment: Clinical  Histologic grading system: 3 grade system       4/17/2023 -  Cancer Staged    Staging form: Breast, AJCC 8th Edition  - Pathologic stage from 4/17/2023: Stage IB (pT2, pN0(sn), cM0, G3, ER+, MO+, HER2-) - Signed by Elsa Scanlon MD on 4/17/2023  Stage prefix: Initial diagnosis  Method of lymph node assessment: [place] lymph node biopsy  Histologic grading system: 3 grade system       5/1/2023 - 5/2/2023 Chemotherapy    Pegfilgrastim-bmez (ZIEXTENZO), 6 mg, Subcutaneous, Once, 1 of 4 cycles  Administration: 6 mg (5/2/2023)  cyclophosphamide (CYTOXAN) IVPB, 600 mg/m2 = 1,170 mg, Intravenous, Once, 1 of 4 cycles  Administration: 1,170 mg (5/1/2023)  DOCEtaxel (TAXOTERE) chemo infusion, 75 mg/m2 = 146.2 mg, Intravenous, Once, 1 of 4 cycles  Administration: 146 mg (5/1/2023)           Past Medical History:   Diagnosis Date   • Anxiety    • Arthritis     right foot/ back./ hands   • Breast cancer ([address])    • Cellulitis 09/13/2019   • COVID     x 2--3/7/22 and approx Sept 2022.  recovered @ home   • Heartburn    • Hypertension    • Hypokalemia 04/01/2020   • Pneumonia of both lungs due to infectious organism 08/19/2019    pt denies   • Tobacco abuse    • Wears dentures     full uppers   • Wears glasses      Past Surgical History:   Procedure Laterality Date   • APPENDECTOMY      APPENDISITIS SURGERY AGE 19   • BREAST BIOPSY Right 01/31/2023   • BREAST LUMPECTOMY Right 3/28/2023    Procedure: LUMPECTOMY BREAST MARY  LOCALIZED;  Surgeon: Rosina Clay MD;  [facility];  Service: Surgical Oncology   • LYMPH NODE BIOPSY Right 3/28/2023    Procedure: BIOPSY LYMPH NODE, SENTINEL LYMPHOSCINTIGRAPHY, LYMPHATIC MAPPING;  Surgeon: Rosina Clay MD;  [facility];  Service: Surgical Oncology   • US GUIDED BREAST BIOPSY RIGHT COMPLETE Right 01/31/2023       Family History   Problem Relation Age of Onset   • COPD Mother    • No Known Problems Father    • No Known Problems Brother    • No Known Problems Brother    • No Known Problems Brother    • No Known Problems Daughter    • No Known Problems Maternal Aunt    • Lung cancer Maternal Uncle         age at dx unk   • Ovarian cancer Maternal Grandmother         age at dx unk   • Prostate cancer Maternal Grandfather         age at dx unk   • No Known Problems Paternal Grandmother    • No Known Problems Paternal Grandfather    • Breast cancer Neg Hx    • Colon cancer Neg Hx        Social History   Social History     Substance and Sexual Activity   Alcohol Use Not Currently   • Alcohol/week: 7.0 standard drinks of alcohol   • Types: 7 Glasses of wine per week    Comment: quit 2 years ago Oct 2020     Social History     Substance and Sexual Activity   Drug Use Never     Social History     Tobacco Use   Smoking Status Every Day   • Packs/day: 0.50   • Years: 45.00   • Total pack years: 22.50   • Types: Cigarettes   • Passive exposure: Never   Smokeless Tobacco Never   Tobacco Comments    States she is smoking 5 cig/day Meds/Allergies     Current Outpatient Medications:   •  acetaminophen (TYLENOL) 650 mg CR tablet, Take 1 tablet (650 mg total) by mouth every 8 (eight) hours as needed for mild pain, Disp: 30 tablet, Rfl: 3  •  albuterol (Ventolin HFA) 90 mcg/act inhaler, Inhale 2 puffs every 6 (six) hours as needed for wheezing, Disp: 18 g, Rfl: 1  •  anastrozole (ARIMIDEX) 1 mg tablet, Take 1 tablet (1 mg total) by mouth daily, Disp: 90 tablet, Rfl: 1  •  atorvastatin (LIPITOR) 10 mg tablet, Take 1 tablet (10 mg total) by mouth daily, Disp: 90 tablet, Rfl: 1  •  buPROPion (Wellbutrin XL) 150 mg 24 hr tablet, Take 1 tablet (150 mg total) by mouth every morning, Disp: 90 tablet, Rfl: 1  •  ibuprofen (MOTRIN) 200 mg tablet, Take 200-800 mg by mouth every 6 (six) hours as needed for mild pain, Disp: , Rfl:   •  irbesartan (AVAPRO) 300 mg tablet, Take 1 tablet (300 mg total) by mouth daily at bedtime, Disp: 90 tablet, Rfl: 1  •  sertraline (ZOLOFT) 50 mg tablet, Take 1 tablet (50 mg total) by mouth daily, Disp: 30 tablet, Rfl: 0  •  hydrochlorothiazide (HYDRODIURIL) 12.5 mg tablet, Take 1 tablet (12.5 mg total) by mouth daily (Patient not taking: Reported on 7/11/2023), Disp: 90 tablet, Rfl: 1  •  ondansetron (ZOFRAN) 4 mg tablet, Take 1 tablet (4 mg total) by mouth every 8 (eight) hours as needed for nausea or vomiting (Patient not taking: Reported on 7/11/2023), Disp: 30 tablet, Rfl: 1  •  traMADol (ULTRAM) 50 mg tablet, Take 1 tablet (50 mg total) by mouth every 8 (eight) hours as needed for moderate pain (Patient not taking: Reported on 6/13/2023), Disp: 9 tablet, Rfl: 0  Allergies   Allergen Reactions   • Amlodipine Edema   • Latex      Added based on information entered during log entry, please review and add reactions, type, and severity as needed   • Lisinopril Cough   • Pollen Extract          Review of Systems   Constitutional: Negative. Negative for unexpected weight change. HENT: Negative. Eyes: Negative. Glasses   Respiratory: Negative. Needs albuterol with acute URI   Cardiovascular: Negative for leg swelling. Gastrointestinal: Negative. Endocrine: Positive for heat intolerance. Genitourinary: Positive for frequency. Negative for difficulty urinating. Musculoskeletal: Positive for back pain. C/o itching in nipple   Skin: Positive for wound (breast incision was opened). Allergic/Immunologic: Positive for environmental allergies. Negative for food allergies. Neurological: Positive for numbness. Light-headedness: incisional numbness. Hematological: Negative. Psychiatric/Behavioral: Positive for sleep disturbance (wakes frequently).       Clinical Trial: no     OB/GYN History:  The patient underwent menarche at 16 years  Menopause Status Post  No LMP recorded. Patient is postmenopausal.  Menopause at 54} years. Menopause Reason natural  Hormone replacement therapy: no.  Years used 0   1   Para 1   Age at first delivery being 22 years. Nursing: no.   Birth control pills: yes. Years used < 1 month      OBJECTIVE:   /82 (BP Location: Left arm, Patient Position: Sitting, Cuff Size: Large)   Pulse 78   Temp 97.5 °F (36.4 °C) (Temporal)   Ht 5' 3" (1.6 m)   Wt 97.8 kg (215 lb 9.8 oz)   SpO2 95%   BMI 38.19 kg/m²   Pain Assessment:  0  Performance Status: ECOG/[place]/WHO: 0 - Asymptomatic    Physical Exam   There is no cervical or axillary adenopathy palpable she has an open wound in the right upper inner quadrant at the lumpectomy site. This measures approximately half inch deep. The base of the wound appears moist.  There is no bleeding or significant discharge associated with this on palpation. No suspicious lesions palpable in either breast no arm edema. Abdomen obese soft nontender. Her breathing is unlabored. Conversing appropriately. Ambulating independently.       RESULTS  Lab Results    Chemistry        Component Value Date/Time    K 4.8 2023 0855     04/27/2023 0855    CO2 26 04/27/2023 0855    BUN 16 04/27/2023 0855    CREATININE 1.06 04/27/2023 0855        Component Value Date/Time    CALCIUM 9.7 04/27/2023 0855    ALKPHOS 115 04/27/2023 0855    AST 19 04/27/2023 0855    ALT 27 04/27/2023 0855            Lab Results   Component Value Date    WBC 6.08 04/27/2023    HGB 12.2 04/27/2023    HCT 38.2 04/27/2023    MCV 91 04/27/2023     04/27/2023         Imaging Studies  No results found. ASSESSMENT  1. Malignant neoplasm of lower-inner quadrant of right breast of female, estrogen receptor positive ([address])  Ambulatory referral to Radiation Oncology        Cancer Staging   Malignant neoplasm of lower-inner quadrant of right breast of female, estrogen receptor positive ([address])  Staging form: Breast, AJCC 8th Edition  - Clinical stage from 2/22/2023: Stage IIA (cT2, cN0, cM0, G3, ER+, [place]+, HER2-) - Signed by Colton Mcgowan MD on 2/22/2023  Stage prefix: Initial diagnosis  Method of lymph node assessment: Clinical  Histologic grading system: 3 grade system  - Pathologic stage from 4/17/2023: Stage IB (pT2, pN0(sn), cM0, G3, ER+, [place]+, HER2-) - Signed by Colton Mcgowan MD on 4/17/2023  Stage prefix: Initial diagnosis  Method of lymph node assessment: [place] lymph node biopsy  Histologic grading system: 3 grade system        PLAN/DISCUSSION  No orders of the defined types were placed in this encounter. Kaveh Mackenzie is a 59y.o. year old female with T2N0 right breast carcinoma status post breast conservation surgery. Her tumor is ER/NY positive HER2 negative. The tumor is grade 3 with negative margins. She did have a component of DCIS with EIC. She received 1 cycle of chemotherapy however had significant side effects and declined any further chemotherapy. She has been initiated on adjuvant hormonal therapy. States that while receiving chemotherapy she had significant cough and had opening of her incision.   Last month she was seen by Dr. Quinn Shelton and sutures were placed with Steri-Strips overlying. Today she has wound which is not closed and without signs of infection. We discussed that this needs to heal prior to initiating radiation. We will contact Dr. Monica Bauer office to see if she be referred to wound care or if they would like to see her in the office again today. I have reviewed with the patient the course of adjuvant radiation therapy directed to the right breast.  Discussed radiation in reducing her risk of recurrence. Her plan will utilize a hypofractionated regimen delivering a dose of 4005 cGy to the right breast over 15 treatments. 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 We reviewed possible side effects with acute and long-term. This can include but not limited to fatigue, skin erythema, hyperpigmentation, desquamation, pneumonitis, breast fibrosis, edema. She is agreeable to the above outlined plan. She will return for simulation once her wound is closed. Janey Vickers MD  7/11/2023,9:28 AM      Portions of the record may have been created with voice recognition software. Occasional wrong word or "sound a like" substitutions may have occurred due to the inherent limitations of voice recognition software. Read the chart carefully and recognize, using context, where substitutions have occurred.

## 2023-07-12 ENCOUNTER — TELEPHONE (OUTPATIENT)
Dept: HEMATOLOGY ONCOLOGY | Facility: CLINIC | Age: 63
End: 2023-07-12

## 2023-07-12 NOTE — TELEPHONE ENCOUNTER
Patient Call    Who are you speaking with? Patient    If it is not the patient, are they listed on an active communication consent form? N/A   What is the reason for this call? Patient calling in stating that wound care was not able to get her in until 8/7. The patient states that she has concerns as she was supposed to start radiation but they will not until the wound is closed as it is an open hole. Does this require a call back? Yes   If a call back is required, please list best call back number [phone]   If a call back is required, advise that a message will be forwarded to their care team and someone will return their call as soon as possible. Did you relay this information to the patient?  Yes

## 2023-07-13 ENCOUNTER — VBI (OUTPATIENT)
Dept: ADMINISTRATIVE | Facility: OTHER | Age: 63
End: 2023-07-13

## 2023-07-16 DIAGNOSIS — I10 ESSENTIAL HYPERTENSION: ICD-10-CM

## 2023-07-18 RX ORDER — HYDROCHLOROTHIAZIDE 12.5 MG/1
TABLET ORAL
Qty: 90 TABLET | Refills: 1 | OUTPATIENT
Start: 2023-07-18

## 2023-07-30 DIAGNOSIS — I10 ESSENTIAL HYPERTENSION: ICD-10-CM

## 2023-07-30 DIAGNOSIS — E78.2 MIXED HYPERLIPIDEMIA: ICD-10-CM

## 2023-07-30 DIAGNOSIS — F41.9 ANXIETY: ICD-10-CM

## 2023-07-30 DIAGNOSIS — F33.1 MODERATE EPISODE OF RECURRENT MAJOR DEPRESSIVE DISORDER (HCC): ICD-10-CM

## 2023-07-30 DIAGNOSIS — Z72.0 TOBACCO ABUSE: ICD-10-CM

## 2023-07-30 RX ORDER — BUPROPION HYDROCHLORIDE 150 MG/1
150 TABLET ORAL EVERY MORNING
Qty: 90 TABLET | Refills: 0 | Status: CANCELLED | OUTPATIENT
Start: 2023-07-30

## 2023-07-31 DIAGNOSIS — Z72.0 TOBACCO ABUSE: ICD-10-CM

## 2023-07-31 DIAGNOSIS — F41.9 ANXIETY: ICD-10-CM

## 2023-07-31 DIAGNOSIS — I10 ESSENTIAL HYPERTENSION: ICD-10-CM

## 2023-07-31 DIAGNOSIS — E78.2 MIXED HYPERLIPIDEMIA: ICD-10-CM

## 2023-07-31 DIAGNOSIS — F33.1 MODERATE EPISODE OF RECURRENT MAJOR DEPRESSIVE DISORDER (HCC): ICD-10-CM

## 2023-07-31 RX ORDER — IRBESARTAN 300 MG/1
300 TABLET ORAL
Qty: 90 TABLET | Refills: 1 | Status: SHIPPED | OUTPATIENT
Start: 2023-07-31

## 2023-07-31 RX ORDER — ATORVASTATIN CALCIUM 10 MG/1
10 TABLET, FILM COATED ORAL DAILY
Qty: 90 TABLET | Refills: 1 | Status: SHIPPED | OUTPATIENT
Start: 2023-07-31

## 2023-07-31 RX ORDER — IRBESARTAN 300 MG/1
300 TABLET ORAL
Qty: 90 TABLET | Refills: 0 | Status: CANCELLED | OUTPATIENT
Start: 2023-07-31

## 2023-07-31 RX ORDER — BUPROPION HYDROCHLORIDE 150 MG/1
150 TABLET ORAL EVERY MORNING
Qty: 90 TABLET | Refills: 1 | Status: SHIPPED | OUTPATIENT
Start: 2023-07-31

## 2023-07-31 RX ORDER — ATORVASTATIN CALCIUM 10 MG/1
10 TABLET, FILM COATED ORAL DAILY
Qty: 90 TABLET | Refills: 0 | Status: CANCELLED | OUTPATIENT
Start: 2023-07-31

## 2023-07-31 NOTE — TELEPHONE ENCOUNTER
Yolanda Coley sent request for prescription refills to our office for Wellbutrin, Avapro, and Lipitor. Called her and discussed these would need to be requested from her PCP's office. She understands.

## 2023-08-01 ENCOUNTER — PATIENT OUTREACH (OUTPATIENT)
Dept: HEMATOLOGY ONCOLOGY | Facility: CLINIC | Age: 63
End: 2023-08-01

## 2023-08-01 NOTE — PROGRESS NOTES
Pt called and asked if she had [facility] set up for appointment on 8/7/23 with the Wound Care? I was able to see in the notes that transportation was already set up for her visit. Pt had no further questions or concerns at this time. I did encourage her to call if any were to arise.

## 2023-08-07 ENCOUNTER — OFFICE VISIT (OUTPATIENT)
Dept: WOUND CARE | Facility: HOSPITAL | Age: 63
End: 2023-08-07
Payer: COMMERCIAL

## 2023-08-07 VITALS
TEMPERATURE: 97.6 F | DIASTOLIC BLOOD PRESSURE: 79 MMHG | RESPIRATION RATE: 12 BRPM | SYSTOLIC BLOOD PRESSURE: 173 MMHG | HEART RATE: 68 BPM

## 2023-08-07 DIAGNOSIS — T81.89XA NON-HEALING SURGICAL WOUND, INITIAL ENCOUNTER: Primary | ICD-10-CM

## 2023-08-07 PROCEDURE — 99203 OFFICE O/P NEW LOW 30 MIN: CPT | Performed by: SURGERY

## 2023-08-07 PROCEDURE — 11042 DBRDMT SUBQ TIS 1ST 20SQCM/<: CPT | Performed by: SURGERY

## 2023-08-07 PROCEDURE — 99213 OFFICE O/P EST LOW 20 MIN: CPT | Performed by: SURGERY

## 2023-08-07 RX ORDER — LIDOCAINE 40 MG/G
CREAM TOPICAL ONCE
Status: COMPLETED | OUTPATIENT
Start: 2023-08-07 | End: 2023-08-07

## 2023-08-07 RX ADMIN — LIDOCAINE 1 APPLICATION: 40 CREAM TOPICAL at 11:17

## 2023-08-07 NOTE — PROGRESS NOTES
Patient ID: Jamarcus Champion is a 59 y.o. female Date of Birth 1960       Chief Complaint   Patient presents with   • New Patient Visit     Wound; Right breast       Allergies:  Amlodipine, Lisinopril, and Pollen extract    Diagnosis:  1. Non-healing surgical wound, initial encounter  Assessment & Plan:  Wound edges debrided, will use Woun'Dres  To add collagen to wound to assist with closure    Orders:  -     lidocaine (LMX) 4 % cream  -     Wound cleansing and dressings; Future  -     Debridement       Diagnosis ICD-10-CM Associated Orders   1. Non-healing surgical wound, initial encounter  T81.89XA lidocaine (LMX) 4 % cream     Wound cleansing and dressings     Debridement             Subjective:   59year old female s/p right breast wide excision for cancer with non healing wound. Currently using antibiotic ointment    Here for wound follow up  The following portions of the patient's history were reviewed and updated as appropriate:   Patient Active Problem List   Diagnosis   • Essential hypertension   • Chronic right shoulder pain   • Encounter for hepatitis C virus screening test for high risk patient   • Colon cancer screening   • Gait abnormality   • Light cigarette smoker (1-9 cigarettes per day)   • Seasonal allergic rhinitis due to pollen   • Mixed hyperlipidemia   • Major depressive disorder   • Anxiety   • Tobacco abuse   • Influenza vaccine refused   • Obesity (BMI 35.0-39.9 without comorbidity)   • Malignant neoplasm of lower-inner quadrant of right breast of female, estrogen receptor positive ([address])   • Pre-operative clearance   • CKD (chronic kidney disease), stage II   • Chemotherapy induced neutropenia ([address])   • Visit for wound check   • Surgical wound, non healing     Past Medical History:   Diagnosis Date   • Anxiety    • Arthritis     right foot/ back./ hands   • Breast cancer ([address])    • Cellulitis 09/13/2019   • COVID     x 2--3/7/22 and approx Sept 2022.  recovered @ home   • Heartburn • Hypertension    • Hypokalemia 04/01/2020   • Pneumonia of both lungs due to infectious organism 08/19/2019    pt denies   • Tobacco abuse    • Wears dentures     full uppers   • Wears glasses      Past Surgical History:   Procedure Laterality Date   • APPENDECTOMY      APPENDISITIS SURGERY AGE 19   • BREAST BIOPSY Right 01/31/2023   • BREAST LUMPECTOMY Right 3/28/2023    Procedure: LUMPECTOMY BREAST MARY  LOCALIZED;  Surgeon: Leigh Paige MD;  Location: [facility];  Service: Surgical Oncology   • LYMPH NODE BIOPSY Right 3/28/2023    Procedure: BIOPSY LYMPH NODE, SENTINEL LYMPHOSCINTIGRAPHY, LYMPHATIC MAPPING;  Surgeon: Leigh Paige MD;  Location: [facility];  Service: Surgical Oncology   • US GUIDED BREAST BIOPSY RIGHT COMPLETE Right 01/31/2023     Social History     Socioeconomic History   • Marital status:      Spouse name: None   • Number of children: None   • Years of education: None   • Highest education level: None   Occupational History   • None   Tobacco Use   • Smoking status: Every Day     Packs/day: 0.03     Years: 45.00     Total pack years: 1.35     Types: Cigarettes     Passive exposure: Never   • Smokeless tobacco: Never   • Tobacco comments:     States she is smoking 4-5 cig/day   Vaping Use   • Vaping Use: Never used   Substance and Sexual Activity   • Alcohol use: Not Currently     Alcohol/week: 7.0 standard drinks of alcohol     Types: 7 Glasses of wine per week     Comment: quit 2 years ago Oct 2020   • Drug use: Never   • Sexual activity: None   Other Topics Concern   • None   Social History Narrative   • None     Social Determinants of Health     Financial Resource Strain: Not on file   Food Insecurity: Not on file   Transportation Needs: Not on file   Physical Activity: Not on file   Stress: Not on file   Social Connections: Not on file   Intimate Partner Violence: Not on file   Housing Stability: Not on file        Current Outpatient Medications:   •  acetaminophen (TYLENOL) 650 mg CR tablet, Take 1 tablet (650 mg total) by mouth every 8 (eight) hours as needed for mild pain, Disp: 30 tablet, Rfl: 3  •  albuterol (Ventolin HFA) 90 mcg/act inhaler, Inhale 2 puffs every 6 (six) hours as needed for wheezing, Disp: 18 g, Rfl: 1  •  anastrozole (ARIMIDEX) 1 mg tablet, Take 1 tablet (1 mg total) by mouth daily, Disp: 90 tablet, Rfl: 1  •  atorvastatin (LIPITOR) 10 mg tablet, Take 1 tablet (10 mg total) by mouth daily, Disp: 90 tablet, Rfl: 1  •  buPROPion (Wellbutrin XL) 150 mg 24 hr tablet, Take 1 tablet (150 mg total) by mouth every morning, Disp: 90 tablet, Rfl: 1  •  hydrochlorothiazide (HYDRODIURIL) 12.5 mg tablet, Take 1 tablet (12.5 mg total) by mouth daily (Patient not taking: Reported on 7/11/2023), Disp: 90 tablet, Rfl: 1  •  ibuprofen (MOTRIN) 200 mg tablet, Take 200-800 mg by mouth every 6 (six) hours as needed for mild pain, Disp: , Rfl:   •  irbesartan (AVAPRO) 300 mg tablet, Take 1 tablet (300 mg total) by mouth daily at bedtime, Disp: 90 tablet, Rfl: 1  •  ondansetron (ZOFRAN) 4 mg tablet, Take 1 tablet (4 mg total) by mouth every 8 (eight) hours as needed for nausea or vomiting (Patient not taking: Reported on 7/11/2023), Disp: 30 tablet, Rfl: 1  •  sertraline (ZOLOFT) 50 mg tablet, Take 1 tablet (50 mg total) by mouth daily, Disp: 30 tablet, Rfl: 0  •  traMADol (ULTRAM) 50 mg tablet, Take 1 tablet (50 mg total) by mouth every 8 (eight) hours as needed for moderate pain (Patient not taking: Reported on 6/13/2023), Disp: 9 tablet, Rfl: 0  No current facility-administered medications for this visit.   Family History   Problem Relation Age of Onset   • COPD Mother    • No Known Problems Father    • No Known Problems Brother    • No Known Problems Brother    • No Known Problems Brother    • No Known Problems Daughter    • No Known Problems Maternal Aunt    • Lung cancer Maternal Uncle         age at dx unk   • Ovarian cancer Maternal Grandmother         age at dx unk   • Prostate cancer Maternal Grandfather         age at dx unk   • No Known Problems Paternal Grandmother    • No Known Problems Paternal Grandfather    • Breast cancer Neg Hx    • Colon cancer Neg Hx       Review of Systems   Constitutional: Negative for chills and fever. HENT: Negative for ear pain and sore throat. Eyes: Negative for pain and visual disturbance. Respiratory: Negative for cough and shortness of breath. Cardiovascular: Negative for chest pain and palpitations. Gastrointestinal: Negative for abdominal pain and vomiting. Genitourinary: Negative for dysuria and hematuria. Musculoskeletal: Negative for arthralgias and back pain. Skin: Negative for color change and rash. Neurological: Negative for seizures and syncope. All other systems reviewed and are negative. Objective:  BP (!) 173/79   Pulse 68   Temp 97.6 °F (36.4 °C)   Resp 12     Physical Exam  Vitals and nursing note reviewed. Constitutional:       General: She is not in acute distress. Appearance: She is well-developed. She is not diaphoretic. HENT:      Head: Normocephalic and atraumatic. Right Ear: External ear normal.      Left Ear: External ear normal.   Eyes:      General: No scleral icterus. Conjunctiva/sclera: Conjunctivae normal.   Neck:      Thyroid: No thyromegaly. Trachea: No tracheal deviation. Cardiovascular:      Rate and Rhythm: Normal rate and regular rhythm. Heart sounds: Normal heart sounds. No murmur heard. No friction rub. Pulmonary:      Effort: Pulmonary effort is normal. No respiratory distress. Breath sounds: Normal breath sounds. No wheezing or rales. Abdominal:      General: There is no distension. Palpations: Abdomen is soft. There is no mass. Tenderness: There is no abdominal tenderness. There is no guarding or rebound. Musculoskeletal:         General: Normal range of motion. Cervical back: Neck supple.    Lymphadenopathy:      Cervical: No cervical adenopathy. Skin:     General: Skin is warm and dry. Comments: Right breast with scar with open wound,  wound clean   Neurological:      Mental Status: She is alert and oriented to person, place, and time. Psychiatric:         Behavior: Behavior normal.         Thought Content: Thought content normal.         Judgment: Judgment normal.             Wound 08/07/23 Surgical Breast Right (Active)   Wound Image   08/07/23 1126   Wound Description Pink 08/07/23 1055   -wound Assessment Intact 08/07/23 1055   Wound Length (cm) 0.1 cm 08/07/23 1055   Wound Width (cm) 0.1 cm 08/07/23 1055   Wound Depth (cm) 0.1 cm 08/07/23 1055   Wound Surface Area (cm^2) 0.01 cm^2 08/07/23 1055   Wound Volume (cm^3) 0.001 cm^3 08/07/23 1055   Calculated Wound Volume (cm^3) 0 cm^3 08/07/23 1055   Drainage Amount Scant 08/07/23 1055   Drainage Description Serosanguineous 08/07/23 1055   Non-staged Wound Description Full thickness 08/07/23 1055   Patient Tolerance Tolerated well 08/07/23 1055   Dressing Status Intact 08/07/23 1055                         Debridement   Wound 08/07/23 Surgical Breast Right    Universal Protocol:  Consent: Verbal consent not obtained. Written consent obtained. Risks and benefits: risks, benefits and alternatives were discussed  Consent given by: patient  Time out: Immediately prior to procedure a "time out" was called to verify the correct patient, procedure, equipment, support staff and site/side marked as required.   Patient identity confirmed: verbally with patient      Performed by: physician  Debridement type: surgical  Level of debridement: subcutaneous tissue  Pain control: lidocaine 4%  Post-debridement measurements  Length (cm): 0.1  Width (cm): 0.1  Depth (cm): 0.1  Percent debrided: 100%  Surface Area (cm^2): 0.01  Area debrided (cm^2): 0.01  Volume (cm^3): 0  Tissue and other material debrided: subcutaneous tissue  Devitalized tissue debrided: slough  Instrument(s) utilized: curette  Bleeding: small  Hemostasis obtained with: pressure  Procedural pain (0-10): 1  Post-procedural pain: 0   Response to treatment: procedure was tolerated well                   Wound Instructions:  Orders Placed This Encounter   Procedures   • Wound cleansing and dressings     Wound location: Right breast  Change dressing daily  You may remove the dressing and shower. Do not leave wound open to air, apply new dressing immediately. Cleanse the wound with mild soap and water, pat dry. Apply WounDres gel to the wound. Cover with gauze  Secure with tape    This was applied today at the [facility]. WounDres provided today. Standing Status:   Future     Standing Expiration Date:   8/7/2024   • Debridement     This order was created via procedure documentation         Judy Woodall MD      Portions of the record may have been created with voice recognition software. Occasional wrong word or "sound a like" substitutions may have occurred due to the inherent limitations of voice recognition software. Read the chart carefully and recognize, using context, where substitutions have occurred.

## 2023-08-07 NOTE — PATIENT INSTRUCTIONS
Orders Placed This Encounter   Procedures    Wound cleansing and dressings     Wound location: Right breast  Change dressing daily  You may remove the dressing and shower. Do not leave wound open to air, apply new dressing immediately. Cleanse the wound with mild soap and water, pat dry. Apply WounDres gel to the wound. Cover with gauze  Secure with tape    This was applied today at the [facility]. WounDres provided today.      Standing Status:   Future     Standing Expiration Date:   8/7/2024

## 2023-08-28 ENCOUNTER — VBI (OUTPATIENT)
Dept: ADMINISTRATIVE | Facility: OTHER | Age: 63
End: 2023-08-28

## 2023-08-28 ENCOUNTER — OFFICE VISIT (OUTPATIENT)
Dept: WOUND CARE | Facility: HOSPITAL | Age: 63
End: 2023-08-28
Payer: COMMERCIAL

## 2023-08-28 VITALS
RESPIRATION RATE: 12 BRPM | TEMPERATURE: 97 F | DIASTOLIC BLOOD PRESSURE: 78 MMHG | HEART RATE: 69 BPM | SYSTOLIC BLOOD PRESSURE: 155 MMHG

## 2023-08-28 DIAGNOSIS — T81.89XA NON-HEALING SURGICAL WOUND, INITIAL ENCOUNTER: Primary | ICD-10-CM

## 2023-08-28 PROCEDURE — 99212 OFFICE O/P EST SF 10 MIN: CPT | Performed by: SURGERY

## 2023-08-28 NOTE — PROGRESS NOTES
Patient ID: Yancy Hernandez is a 59 y.o. female Date of Birth 1960       Chief Complaint   Patient presents with   • Follow Up Wound Care Visit     Right breast; non healing surgical wound       Allergies:  Amlodipine, Lisinopril, and Pollen extract    Diagnosis:  1. Non-healing surgical wound, initial encounter  Assessment & Plan:  Wound closed, ok to begin radiation therapy    Orders:  -     Wound cleansing and dressings; Future       Diagnosis ICD-10-CM Associated Orders   1. Non-healing surgical wound, initial encounter  T81.89XA Wound cleansing and dressings             Subjective:   HPI  Here for wound follow up  The following portions of the patient's history were reviewed and updated as appropriate:   Patient Active Problem List   Diagnosis   • Essential hypertension   • Chronic right shoulder pain   • Encounter for hepatitis C virus screening test for high risk patient   • Colon cancer screening   • Gait abnormality   • Light cigarette smoker (1-9 cigarettes per day)   • Seasonal allergic rhinitis due to pollen   • Mixed hyperlipidemia   • Major depressive disorder   • Anxiety   • Tobacco abuse   • Influenza vaccine refused   • Obesity (BMI 35.0-39.9 without comorbidity)   • Malignant neoplasm of lower-inner quadrant of right breast of female, estrogen receptor positive ([address])   • Pre-operative clearance   • CKD (chronic kidney disease), stage II   • Chemotherapy induced neutropenia ([address])   • Visit for wound check   • Surgical wound, non healing     Past Medical History:   Diagnosis Date   • Anxiety    • Arthritis     right foot/ back./ hands   • Breast cancer ([address])    • Cellulitis 09/13/2019   • COVID     x 2--3/7/22 and approx Sept 2022.  recovered @ home   • Heartburn    • Hypertension    • Hypokalemia 04/01/2020   • Pneumonia of both lungs due to infectious organism 08/19/2019    pt denies   • Tobacco abuse    • Wears dentures     full uppers   • Wears glasses      Past Surgical History:   Procedure Laterality Date   • APPENDECTOMY      APPENDISITIS SURGERY AGE 19   • BREAST BIOPSY Right 01/31/2023   • BREAST LUMPECTOMY Right 3/28/2023    Procedure: LUMPECTOMY BREAST MARY  LOCALIZED;  Surgeon: Destin Mayer MD;  Location: [facility];  Service: Surgical Oncology   • LYMPH NODE BIOPSY Right 3/28/2023    Procedure: BIOPSY LYMPH NODE, SENTINEL LYMPHOSCINTIGRAPHY, LYMPHATIC MAPPING;  Surgeon: Destin Mayer MD;  Location[facility];  Service: Surgical Oncology   • US GUIDED BREAST BIOPSY RIGHT COMPLETE Right 01/31/2023     Social History     Socioeconomic History   • Marital status:      Spouse name: None   • Number of children: None   • Years of education: None   • Highest education level: None   Occupational History   • None   Tobacco Use   • Smoking status: Every Day     Packs/day: 0.03     Years: 45.00     Total pack years: 1.35     Types: Cigarettes     Passive exposure: Never   • Smokeless tobacco: Never   • Tobacco comments:     States she is smoking 4-5 cig/day   Vaping Use   • Vaping Use: Never used   Substance and Sexual Activity   • Alcohol use: Not Currently     Alcohol/week: 7.0 standard drinks of alcohol     Types: 7 Glasses of wine per week     Comment: quit 2 years ago Oct 2020   • Drug use: Never   • Sexual activity: None   Other Topics Concern   • None   Social History Narrative   • None     Social Determinants of Health     Financial Resource Strain: Not on file   Food Insecurity: Not on file   Transportation Needs: Not on file   Physical Activity: Not on file   Stress: Not on file   Social Connections: Not on file   Intimate Partner Violence: Not on file   Housing Stability: Not on file        Current Outpatient Medications:   •  acetaminophen (TYLENOL) 650 mg CR tablet, Take 1 tablet (650 mg total) by mouth every 8 (eight) hours as needed for mild pain, Disp: 30 tablet, Rfl: 3  •  albuterol (Ventolin HFA) 90 mcg/act inhaler, Inhale 2 puffs every 6 (six) hours as needed for wheezing, Disp: 18 g, Rfl: 1  •  anastrozole (ARIMIDEX) 1 mg tablet, Take 1 tablet (1 mg total) by mouth daily, Disp: 90 tablet, Rfl: 1  •  atorvastatin (LIPITOR) 10 mg tablet, Take 1 tablet (10 mg total) by mouth daily, Disp: 90 tablet, Rfl: 1  •  buPROPion (Wellbutrin XL) 150 mg 24 hr tablet, Take 1 tablet (150 mg total) by mouth every morning, Disp: 90 tablet, Rfl: 1  •  hydrochlorothiazide (HYDRODIURIL) 12.5 mg tablet, Take 1 tablet (12.5 mg total) by mouth daily (Patient not taking: Reported on 7/11/2023), Disp: 90 tablet, Rfl: 1  •  ibuprofen (MOTRIN) 200 mg tablet, Take 200-800 mg by mouth every 6 (six) hours as needed for mild pain, Disp: , Rfl:   •  irbesartan (AVAPRO) 300 mg tablet, Take 1 tablet (300 mg total) by mouth daily at bedtime, Disp: 90 tablet, Rfl: 1  •  ondansetron (ZOFRAN) 4 mg tablet, Take 1 tablet (4 mg total) by mouth every 8 (eight) hours as needed for nausea or vomiting (Patient not taking: Reported on 7/11/2023), Disp: 30 tablet, Rfl: 1  •  sertraline (ZOLOFT) 50 mg tablet, Take 1 tablet (50 mg total) by mouth daily, Disp: 30 tablet, Rfl: 0  •  traMADol (ULTRAM) 50 mg tablet, Take 1 tablet (50 mg total) by mouth every 8 (eight) hours as needed for moderate pain (Patient not taking: Reported on 6/13/2023), Disp: 9 tablet, Rfl: 0  Family History   Problem Relation Age of Onset   • COPD Mother    • No Known Problems Father    • No Known Problems Brother    • No Known Problems Brother    • No Known Problems Brother    • No Known Problems Daughter    • No Known Problems Maternal Aunt    • Lung cancer Maternal Uncle         age at dx unk   • Ovarian cancer Maternal Grandmother         age at dx unk   • Prostate cancer Maternal Grandfather         age at dx unk   • No Known Problems Paternal Grandmother    • No Known Problems Paternal Grandfather    • Breast cancer Neg Hx    • Colon cancer Neg Hx       Review of Systems      Objective:  /78   Pulse 69   Temp (!) 97 °F (36.1 °C)   Resp 12 Physical Exam        Wound 08/07/23 Surgical Breast Right (Active)   Wound Image   08/28/23 0957   Wound Description Dry 08/28/23 0957   Edie-wound Assessment Maceration 08/28/23 0957   Wound Length (cm) 0 cm 08/28/23 0957   Wound Width (cm) 0 cm 08/28/23 0957   Wound Depth (cm) 0 cm 08/28/23 0957   Wound Surface Area (cm^2) 0 cm^2 08/28/23 0957   Wound Volume (cm^3) 0 cm^3 08/28/23 0957   Calculated Wound Volume (cm^3) 0 cm^3 08/28/23 0957   Drainage Amount None 08/28/23 0957   Drainage Description Serosanguineous 08/07/23 1055   Non-staged Wound Description Not applicable 24/12/51 1433   Patient Tolerance Tolerated well 08/28/23 0957   Dressing Status Intact 08/28/23 0957     Right breast wound healed                    Procedures             Wound Instructions:  Orders Placed This Encounter   Procedures   • Wound cleansing and dressings     Wound is healed. No needed dressing. You are discharged from wound care center today. Contact wound care center when you have reopen wound or new wound. Standing Status:   Future     Standing Expiration Date:   8/28/2024         Mere Prince MD      Portions of the record may have been created with voice recognition software. Occasional wrong word or "sound a like" substitutions may have occurred due to the inherent limitations of voice recognition software. Read the chart carefully and recognize, using context, where substitutions have occurred.

## 2023-09-19 ENCOUNTER — CLINICAL SUPPORT (OUTPATIENT)
Dept: RADIATION ONCOLOGY | Facility: CLINIC | Age: 63
End: 2023-09-19
Attending: RADIOLOGY
Payer: COMMERCIAL

## 2023-09-19 VITALS
HEART RATE: 70 BPM | DIASTOLIC BLOOD PRESSURE: 82 MMHG | BODY MASS INDEX: 37.18 KG/M2 | TEMPERATURE: 97.9 F | WEIGHT: 209.88 LBS | OXYGEN SATURATION: 96 % | SYSTOLIC BLOOD PRESSURE: 164 MMHG

## 2023-09-19 DIAGNOSIS — Z17.0 MALIGNANT NEOPLASM OF LOWER-INNER QUADRANT OF RIGHT BREAST OF FEMALE, ESTROGEN RECEPTOR POSITIVE: Primary | ICD-10-CM

## 2023-09-19 DIAGNOSIS — C50.311 MALIGNANT NEOPLASM OF LOWER-INNER QUADRANT OF RIGHT BREAST OF FEMALE, ESTROGEN RECEPTOR POSITIVE: Primary | ICD-10-CM

## 2023-09-19 PROCEDURE — 99215 OFFICE O/P EST HI 40 MIN: CPT | Performed by: RADIOLOGY

## 2023-09-19 PROCEDURE — 99211 OFF/OP EST MAY X REQ PHY/QHP: CPT | Performed by: RADIOLOGY

## 2023-09-19 NOTE — PROGRESS NOTES
Kelin Kt 1960 is a 59 y.o. female With T2NO right breast cancer, s/p breast conservation surgery. Tumor is grade 3, ER/NE+, HER2 negative. Surgical margins were negative. Completed one cycle of chemotherapy with significant side effects and elected to discontinue treatment. She was initiated on adjuvant hormonal therapy. She was seen in consult with radiation oncology on 7/11/23. Was found to have open incision and was referred to wound care. Presents today, to review RT. She was discharged from wound care and advised ok to begin radiation on 8/28/23.     Upcoming:  10/17/23  Surgical Oncology  11/16/23  Medical Oncology      Follow up visit     Oncology History   Malignant neoplasm of lower-inner quadrant of right breast of female, estrogen receptor positive ([address])   2/22/2023 Initial Diagnosis    Malignant neoplasm of lower-inner quadrant of right breast of female, estrogen receptor positive ([address])     2/22/2023 -  Cancer Staged    Staging form: Breast, AJCC 8th Edition  - Clinical stage from 2/22/2023: Stage IIA (cT2, cN0, cM0, G3, ER+, NE+, HER2-) - Signed by Kimo Price MD on 2/22/2023  Stage prefix: Initial diagnosis  Method of lymph node assessment: Clinical  Histologic grading system: 3 grade system       4/17/2023 -  Cancer Staged    Staging form: Breast, AJCC 8th Edition  - Pathologic stage from 4/17/2023: Stage IB (pT2, pN0(sn), cM0, G3, ER+, NE+, HER2-) - Signed by Kimo Price MD on 4/17/2023  Stage prefix: Initial diagnosis  Method of lymph node assessment: [place] lymph node biopsy  Histologic grading system: 3 grade system       5/1/2023 - 5/2/2023 Chemotherapy    Pegfilgrastim-bmez (ZIEXTENZO), 6 mg, Subcutaneous, Once, 1 of 4 cycles  Administration: 6 mg (5/2/2023)  cyclophosphamide (CYTOXAN) IVPB, 600 mg/m2 = 1,170 mg, Intravenous, Once, 1 of 4 cycles  Administration: 1,170 mg (5/1/2023)  DOCEtaxel (TAXOTERE) chemo infusion, 75 mg/m2 = 146.2 mg, Intravenous, Once, 1 of 4 cycles  Administration: 146 mg (5/1/2023)         Review of Systems:  Review of Systems   Constitutional: Negative. HENT: Negative. Eyes:        Glasses    Respiratory: Positive for cough and shortness of breath. Smokes 1/3 ppd     Cardiovascular: Negative. Gastrointestinal: Negative. Endocrine: Positive for heat intolerance. Hot flashes from anastrozle   Genitourinary: Negative for dysuria, flank pain and vaginal pain. Musculoskeletal: Negative. Skin:        Incision closed an healed    Allergic/Immunologic: Negative. Neurological: Negative. Hematological: Negative. Psychiatric/Behavioral: The patient is not nervous/anxious.         Clinical Trial: no    Teaching completed    Health Maintenance   Topic Date Due   • Pneumococcal Vaccine: Pediatrics (0 to 5 Years) and At-Risk Patients (6 to 59 Years) (1 - PCV) Never done   • HIV Screening  Never done   • Annual Physical  Never done   • DTaP,Tdap,and Td Vaccines (1 - Tdap) Never done   • Cervical Cancer Screening  Never done   • Colorectal Cancer Screening  Never done   • Osteoporosis Screening  Never done   • COVID-19 Vaccine (2 - Moderna risk series) 09/27/2021   • Influenza Vaccine (1) 09/01/2023   • BMI: Followup Plan  03/07/2024   • Depression Remission PHQ  01/11/2024   • Breast Cancer Screening: Mammogram  01/17/2024   • BMI: Adult  07/11/2024   • Hepatitis C Screening  Completed   • HIB Vaccine  Aged Out   • IPV Vaccine  Aged Out   • Hepatitis A Vaccine  Aged Out   • Meningococcal ACWY Vaccine  Aged Out   • HPV Vaccine  Aged Out   • Lung Cancer Screening  Discontinued     Patient Active Problem List   Diagnosis   • Essential hypertension   • Chronic right shoulder pain   • Encounter for hepatitis C virus screening test for high risk patient   • Colon cancer screening   • Gait abnormality   • Light cigarette smoker (1-9 cigarettes per day)   • Seasonal allergic rhinitis due to pollen   • Mixed hyperlipidemia   • Major depressive disorder   • Anxiety   • Tobacco abuse   • Influenza vaccine refused   • Obesity (BMI 35.0-39.9 without comorbidity)   • Malignant neoplasm of lower-inner quadrant of right breast of female, estrogen receptor positive (HCC)   • Pre-operative clearance   • CKD (chronic kidney disease), stage II   • Chemotherapy induced neutropenia (HCC)   • Visit for wound check   • Surgical wound, non healing     Past Medical History:   Diagnosis Date   • Anxiety    • Arthritis     right foot/ back./ hands   • Breast cancer ([address])    • Cellulitis 09/13/2019   • COVID     x 2--3/7/22 and approx Sept 2022.  recovered @ home   • Heartburn    • Hypertension    • Hypokalemia 04/01/2020   • Pneumonia of both lungs due to infectious organism 08/19/2019    pt denies   • Tobacco abuse    • Wears dentures     full uppers   • Wears glasses      Past Surgical History:   Procedure Laterality Date   • APPENDECTOMY      APPENDISITIS SURGERY AGE 19   • BREAST BIOPSY Right 01/31/2023   • BREAST LUMPECTOMY Right 3/28/2023    Procedure: LUMPECTOMY BREAST MARY  LOCALIZED;  Surgeon: Jorge A Jackson MD;  Location: [facility];  Service: Surgical Oncology   • LYMPH NODE BIOPSY Right 3/28/2023    Procedure: BIOPSY LYMPH NODE, SENTINEL LYMPHOSCINTIGRAPHY, LYMPHATIC MAPPING;  Surgeon: Jorge A Jackson MD;  Location: [facility];  Service: Surgical Oncology   • US GUIDED BREAST BIOPSY RIGHT COMPLETE Right 01/31/2023     Family History   Problem Relation Age of Onset   • COPD Mother    • No Known Problems Father    • No Known Problems Brother    • No Known Problems Brother    • No Known Problems Brother    • No Known Problems Daughter    • No Known Problems Maternal Aunt    • Lung cancer Maternal Uncle         age at dx unk   • Ovarian cancer Maternal Grandmother         age at dx unk   • Prostate cancer Maternal Grandfather         age at dx unk   • No Known Problems Paternal Grandmother    • No Known Problems Paternal Grandfather    • Breast cancer Neg Hx • Colon cancer Neg Hx      Social History     Socioeconomic History   • Marital status:      Spouse name: Not on file   • Number of children: Not on file   • Years of education: Not on file   • Highest education level: Not on file   Occupational History   • Not on file   Tobacco Use   • Smoking status: Every Day     Packs/day: 0.03     Years: 45.00     Total pack years: 1.35     Types: Cigarettes     Passive exposure: Never   • Smokeless tobacco: Never   • Tobacco comments:     States she is smoking 4-5 cig/day   Vaping Use   • Vaping Use: Never used   Substance and Sexual Activity   • Alcohol use: Not Currently     Alcohol/week: 7.0 standard drinks of alcohol     Types: 7 Glasses of wine per week     Comment: quit 2 years ago Oct 2020   • Drug use: Never   • Sexual activity: Not on file   Other Topics Concern   • Not on file   Social History Narrative   • Not on file     Social Determinants of Health     Financial Resource Strain: Not on file   Food Insecurity: Not on file   Transportation Needs: Not on file   Physical Activity: Not on file   Stress: Not on file   Social Connections: Not on file   Intimate Partner Violence: Not on file   Housing Stability: Not on file       Current Outpatient Medications:   •  acetaminophen (TYLENOL) 650 mg CR tablet, Take 1 tablet (650 mg total) by mouth every 8 (eight) hours as needed for mild pain, Disp: 30 tablet, Rfl: 3  •  albuterol (Ventolin HFA) 90 mcg/act inhaler, Inhale 2 puffs every 6 (six) hours as needed for wheezing, Disp: 18 g, Rfl: 1  •  anastrozole (ARIMIDEX) 1 mg tablet, Take 1 tablet (1 mg total) by mouth daily, Disp: 90 tablet, Rfl: 1  •  atorvastatin (LIPITOR) 10 mg tablet, Take 1 tablet (10 mg total) by mouth daily, Disp: 90 tablet, Rfl: 1  •  buPROPion (Wellbutrin XL) 150 mg 24 hr tablet, Take 1 tablet (150 mg total) by mouth every morning, Disp: 90 tablet, Rfl: 1  •  hydrochlorothiazide (HYDRODIURIL) 12.5 mg tablet, Take 1 tablet (12.5 mg total) by mouth daily (Patient not taking: Reported on 7/11/2023), Disp: 90 tablet, Rfl: 1  •  ibuprofen (MOTRIN) 200 mg tablet, Take 200-800 mg by mouth every 6 (six) hours as needed for mild pain, Disp: , Rfl:   •  irbesartan (AVAPRO) 300 mg tablet, Take 1 tablet (300 mg total) by mouth daily at bedtime, Disp: 90 tablet, Rfl: 1  •  ondansetron (ZOFRAN) 4 mg tablet, Take 1 tablet (4 mg total) by mouth every 8 (eight) hours as needed for nausea or vomiting (Patient not taking: Reported on 7/11/2023), Disp: 30 tablet, Rfl: 1  •  sertraline (ZOLOFT) 50 mg tablet, Take 1 tablet (50 mg total) by mouth daily, Disp: 30 tablet, Rfl: 0  •  traMADol (ULTRAM) 50 mg tablet, Take 1 tablet (50 mg total) by mouth every 8 (eight) hours as needed for moderate pain (Patient not taking: Reported on 6/13/2023), Disp: 9 tablet, Rfl: 0  Allergies   Allergen Reactions   • Amlodipine Edema   • Lisinopril Cough   • Pollen Extract      There were no vitals filed for this visit.

## 2023-09-19 NOTE — PROGRESS NOTES
Follow-up - Radiation Oncology   Lena Sutherland 1960 59 y.o. female [id number]      History of Present Illness   Cancer Staging   Malignant neoplasm of lower-inner quadrant of right breast of female, estrogen receptor positive ([address])  Staging form: Breast, AJCC 8th Edition  - Clinical stage from 2/22/2023: Stage IIA (cT2, cN0, cM0, G3, ER+, ME+, HER2-) - Signed by Rio Washington MD on 2/22/2023  Stage prefix: Initial diagnosis  Method of lymph node assessment: Clinical  Histologic grading system: 3 grade system  - Pathologic stage from 4/17/2023: Stage IB (pT2, pN0(sn), cM0, G3, ER+, ME+, HER2-) - Signed by Rio Washington MD on 4/17/2023  Stage prefix: Initial diagnosis  Method of lymph node assessment: [place] lymph node biopsy  Histologic grading system: 3 grade system          Interval History:  eLna Sutherland 1960 is a 59 y.o. female With T2NO right breast cancer, s/p breast conservation surgery. Tumor is grade 3, ER/ME+, HER2 negative. Surgical margins were negative. Completed one cycle of chemotherapy with significant side effects and elected to discontinue treatment. She was initiated on adjuvant hormonal therapy. She was seen in consult with radiation oncology on 7/11/23. Was found to have open incision and was referred to wound care. Presents today, to review RT.   She was discharged from wound care and advised ok to begin radiation on 8/28/23.     Upcoming:  10/17/23  Surgical Oncology  11/16/23  Medical Oncology       Historical Information   Oncology History   Malignant neoplasm of lower-inner quadrant of right breast of female, estrogen receptor positive ([address])   2/22/2023 Initial Diagnosis    Malignant neoplasm of lower-inner quadrant of right breast of female, estrogen receptor positive ([address])     2/22/2023 -  Cancer Staged    Staging form: Breast, AJCC 8th Edition  - Clinical stage from 2/22/2023: Stage IIA (cT2, cN0, cM0, G3, ER+, ME+, HER2-) - Signed by Rio Washington MD on 2/22/2023  Stage prefix: Initial diagnosis  Method of lymph node assessment: Clinical  Histologic grading system: 3 grade system       4/17/2023 -  Cancer Staged    Staging form: Breast, AJCC 8th Edition  - Pathologic stage from 4/17/2023: Stage IB (pT2, pN0(sn), cM0, G3, ER+, SC+, HER2-) - Signed by Lety Casillas MD on 4/17/2023  Stage prefix: Initial diagnosis  Method of lymph node assessment: [place] lymph node biopsy  Histologic grading system: 3 grade system       5/1/2023 - 5/2/2023 Chemotherapy    Pegfilgrastim-bmez (ZIEXTENZO), 6 mg, Subcutaneous, Once, 1 of 4 cycles  Administration: 6 mg (5/2/2023)  cyclophosphamide (CYTOXAN) IVPB, 600 mg/m2 = 1,170 mg, Intravenous, Once, 1 of 4 cycles  Administration: 1,170 mg (5/1/2023)  DOCEtaxel (TAXOTERE) chemo infusion, 75 mg/m2 = 146.2 mg, Intravenous, Once, 1 of 4 cycles  Administration: 146 mg (5/1/2023)         Past Medical History:   Diagnosis Date   • Anxiety    • Arthritis     right foot/ back./ hands   • Breast cancer ([address])    • Cellulitis 09/13/2019   • COVID     x 2--3/7/22 and approx Sept 2022.  recovered @ home   • Heartburn    • Hypertension    • Hypokalemia 04/01/2020   • Pneumonia of both lungs due to infectious organism 08/19/2019    pt denies   • Tobacco abuse    • Wears dentures     full uppers   • Wears glasses      Past Surgical History:   Procedure Laterality Date   • APPENDECTOMY      APPENDISITIS SURGERY AGE 19   • BREAST BIOPSY Right 01/31/2023   • BREAST LUMPECTOMY Right 3/28/2023    Procedure: LUMPECTOMY BREAST MARY  LOCALIZED;  Surgeon: Lety Casillas MD;  Location[facility];  Service: Surgical Oncology   • LYMPH NODE BIOPSY Right 3/28/2023    Procedure: BIOPSY LYMPH NODE, SENTINEL LYMPHOSCINTIGRAPHY, LYMPHATIC MAPPING;  Surgeon: Lety Casillas MD;  Location[facility];  Service: Surgical Oncology   • US GUIDED BREAST BIOPSY RIGHT COMPLETE Right 01/31/2023       Social History   Social History     Substance and Sexual Activity   Alcohol Use Not Currently   • Alcohol/week: 7.0 standard drinks of alcohol   • Types: 7 Glasses of wine per week    Comment: quit 2 years ago Oct 2020     Social History     Substance and Sexual Activity   Drug Use Never     Social History     Tobacco Use   Smoking Status Every Day   • Packs/day: 0.03   • Years: 45.00   • Total pack years: 1.35   • Types: Cigarettes   • Passive exposure: Never   Smokeless Tobacco Never   Tobacco Comments    States she is smoking 4-5 cig/day         Meds/Allergies     Current Outpatient Medications:   •  acetaminophen (TYLENOL) 650 mg CR tablet, Take 1 tablet (650 mg total) by mouth every 8 (eight) hours as needed for mild pain, Disp: 30 tablet, Rfl: 3  •  anastrozole (ARIMIDEX) 1 mg tablet, Take 1 tablet (1 mg total) by mouth daily, Disp: 90 tablet, Rfl: 1  •  atorvastatin (LIPITOR) 10 mg tablet, Take 1 tablet (10 mg total) by mouth daily, Disp: 90 tablet, Rfl: 1  •  buPROPion (Wellbutrin XL) 150 mg 24 hr tablet, Take 1 tablet (150 mg total) by mouth every morning, Disp: 90 tablet, Rfl: 1  •  irbesartan (AVAPRO) 300 mg tablet, Take 1 tablet (300 mg total) by mouth daily at bedtime, Disp: 90 tablet, Rfl: 1  •  sertraline (ZOLOFT) 50 mg tablet, Take 1 tablet (50 mg total) by mouth daily, Disp: 30 tablet, Rfl: 0  •  albuterol (Ventolin HFA) 90 mcg/act inhaler, Inhale 2 puffs every 6 (six) hours as needed for wheezing (Patient not taking: Reported on 9/19/2023), Disp: 18 g, Rfl: 1  •  hydrochlorothiazide (HYDRODIURIL) 12.5 mg tablet, Take 1 tablet (12.5 mg total) by mouth daily (Patient not taking: Reported on 7/11/2023), Disp: 90 tablet, Rfl: 1  •  ibuprofen (MOTRIN) 200 mg tablet, Take 200-800 mg by mouth every 6 (six) hours as needed for mild pain (Patient not taking: Reported on 9/19/2023), Disp: , Rfl:   •  ondansetron (ZOFRAN) 4 mg tablet, Take 1 tablet (4 mg total) by mouth every 8 (eight) hours as needed for nausea or vomiting (Patient not taking: Reported on 7/11/2023), Disp: 30 tablet, Rfl: 1  •  traMADol (ULTRAM) 50 mg tablet, Take 1 tablet (50 mg total) by mouth every 8 (eight) hours as needed for moderate pain (Patient not taking: Reported on 6/13/2023), Disp: 9 tablet, Rfl: 0  Allergies   Allergen Reactions   • Amlodipine Edema   • Lisinopril Cough   • Pollen Extract          Review of Systems   Constitutional: Negative. HENT: Negative. Eyes:        Glasses    Respiratory: Positive for cough and shortness of breath. Smokes 1/3 ppd     Cardiovascular: Negative. Gastrointestinal: Negative. Endocrine: Positive for heat intolerance. Hot flashes from anastrozle   Genitourinary: Negative for dysuria, flank pain and vaginal pain. Musculoskeletal: Negative. Skin:        Incision closed an healed    Allergic/Immunologic: Negative. Neurological: Negative. Hematological: Negative. Psychiatric/Behavioral: The patient is not nervous/anxious. OBJECTIVE:   /82 (BP Location: Left arm, Patient Position: Sitting, Cuff Size: Large)   Pulse 70   Temp 97.9 °F (36.6 °C)   Wt 95.2 kg (209 lb 14.1 oz)   SpO2 96%   BMI 37.18 kg/m²   Pain Assessment:  0  ECOG/[facility]/WHO: 0 - Asymptomatic    Physical Exam   There is no supraclavicular or axillary adenopathy palpable. Her incision in the right inner quadrant is now healed. There is no discharge. No open areas at her incisional site. No suspicious lesions palpable. There is a defect at that site. No breast or arm edema. No suspicious lesions palpable on the left breast.  Her breathing is unlabored. Ambulating independently and conversing appropriately        RESULTS    Lab Results: No results found for this or any previous visit (from the past 672 hour(s)). Imaging Studies:No results found. Assessment/Plan:  No orders of the defined types were placed in this encounter. Lori Frost is a 59y.o. year old female with T2N0 right breast carcinoma status postlumpectomy.   Her tumor is grade 3, ER/OR positive and HER2 negative. She had wound dehiscence and has been undergoing wound care therapy. Her incision is now closed and she has been cleared from wound care to initiate her radiation treatment. I reviewed with her once again the goal of radiation treatment along with its side effects. We discussed to the right whole breast with concurrent boost should she meet dosimetric criteria. Otherwise she will have sequential boost following whole breast irradiation. Scheduled for CT simulation and her treatment will begin shortly thereafter. She has discussed utilization of [facility] which will pick her up from work. Estrella Kent MD  9/19/2023,10:03 AM    Portions of the record may have been created with voice recognition software.  Occasional wrong word or "sound a like" substitutions may have occurred due to the inherent limitations of voice recognition software.  Read the chart carefully and recognize, using context, where substitutions have occurred.

## 2023-09-25 ENCOUNTER — PATIENT OUTREACH (OUTPATIENT)
Dept: HEMATOLOGY ONCOLOGY | Facility: CLINIC | Age: 63
End: 2023-09-25

## 2023-09-25 NOTE — PROGRESS NOTES
Pt called to give me dates that she needed to have [facility] [employer] set up. Pt now has confirmed transportation for   10/3/23 @ 2:00 Radiation  10/17/23 @ 10:00 Kimberly Zhou  11/16/23 @ 11:00 Dr Hemanth Mathew    Pt is aware and has no further questions or concerns at this time. I did encourage her to call if any were to arise.

## 2023-10-03 ENCOUNTER — PATIENT OUTREACH (OUTPATIENT)
Dept: HEMATOLOGY ONCOLOGY | Facility: CLINIC | Age: 63
End: 2023-10-03

## 2023-10-03 ENCOUNTER — APPOINTMENT (OUTPATIENT)
Dept: RADIATION ONCOLOGY | Facility: CLINIC | Age: 63
End: 2023-10-03
Attending: RADIOLOGY
Payer: COMMERCIAL

## 2023-10-03 PROCEDURE — 77332 RADIATION TREATMENT AID(S): CPT | Performed by: RADIOLOGY

## 2023-10-03 PROCEDURE — 77290 THER RAD SIMULAJ FIELD CPLX: CPT | Performed by: RADIOLOGY

## 2023-10-03 NOTE — PROGRESS NOTES
Pt called and asked if I could set up [facility] for her Radiation treatments. I did outreach [facility] and provided them with a list of all upcoming dates and times needed for transport. [facility] did verify the request and I also verified with the Pt this has be taken care of. Pt has no further questions or concerns at this time. I did encourage her to call if any were to arise.

## 2023-10-06 ENCOUNTER — TELEPHONE (OUTPATIENT)
Dept: HEMATOLOGY ONCOLOGY | Facility: CLINIC | Age: 63
End: 2023-10-06

## 2023-10-06 NOTE — TELEPHONE ENCOUNTER
I called [facility] regarding an appointment that they have scheduled with Dr. Quoc Brantley scheduled on 11/16/23 11:00am     I left a voicemail explaining to patient that this appointment will need to be rescheduled due to a change in the providers schedule. Patient was advised to call [facility] to reschedule.   Another attempt will be made to reschedule

## 2023-10-10 ENCOUNTER — TELEPHONE (OUTPATIENT)
Dept: HEMATOLOGY ONCOLOGY | Facility: CLINIC | Age: 63
End: 2023-10-10

## 2023-10-10 NOTE — TELEPHONE ENCOUNTER
[facility]  DR gisela HERNANDEZ   Who are you speaking with? Patient   If it is not the patient, are they listed on an active communication consent form? N/A   Is this a KALPANA or DR gisela HERNANDEZ [facility]   Which provider is patient currently scheduled or established with? Dr. Petra Hamilton   What is the original appointment date and time? 11/16/23 11AM   At which location is the appointment scheduled to take place? [facility]   Which provider is the patient transitioning care to? Lobo Gomez PA-C   What is the new appointment date and time? 12/06/23 1:30PM   At which location is the new appointment scheduled to take place? [facility]   What is the reason for this change?  Provider     [facility] patient

## 2023-10-12 ENCOUNTER — APPOINTMENT (OUTPATIENT)
Dept: RADIATION ONCOLOGY | Facility: CLINIC | Age: 63
End: 2023-10-12
Attending: RADIOLOGY
Payer: COMMERCIAL

## 2023-10-13 ENCOUNTER — APPOINTMENT (OUTPATIENT)
Dept: RADIATION ONCOLOGY | Facility: CLINIC | Age: 63
End: 2023-10-13
Payer: COMMERCIAL

## 2023-10-19 PROCEDURE — 77300 RADIATION THERAPY DOSE PLAN: CPT | Performed by: RADIOLOGY

## 2023-10-19 PROCEDURE — 77334 RADIATION TREATMENT AID(S): CPT | Performed by: RADIOLOGY

## 2023-10-19 PROCEDURE — 77295 3-D RADIOTHERAPY PLAN: CPT | Performed by: RADIOLOGY

## 2023-10-23 PROCEDURE — 77412 RADIATION TX DELIVERY LVL 3: CPT | Performed by: RADIOLOGY

## 2023-10-23 PROCEDURE — 77331 SPECIAL RADIATION DOSIMETRY: CPT | Performed by: RADIOLOGY

## 2023-10-23 PROCEDURE — 77417 THER RADIOLOGY PORT IMAGE(S): CPT | Performed by: RADIOLOGY

## 2023-10-24 ENCOUNTER — PATIENT OUTREACH (OUTPATIENT)
Dept: HEMATOLOGY ONCOLOGY | Facility: CLINIC | Age: 63
End: 2023-10-24

## 2023-10-24 PROCEDURE — 77412 RADIATION TX DELIVERY LVL 3: CPT | Performed by: RADIOLOGY

## 2023-10-24 NOTE — PROGRESS NOTES
I spoke with patient and she stated she received a text message from [facility] stating they would be picking her up at 1:30 today at her home. Patient states she is to be picked at her job. I spoke with [facility] and informed them of patient's daily  time of 2pm at her job. I also informed them on 2-1 she would need to be picked up at her home and every Thursday following that , she would be picked up at her home. Lola Newby at [facility] confirmed changes, and updated patient's transportation. This information was relayed to patient . Radiation Oncology Treatment        Are you having any side effects from your treatment? No       Are you having any skin reaction? No      Are you experiencing any fatigue? No         Are you having any pain? No      Do you know when your upcoming appointments are? Yes, next appointment is 10- @ 2:45pm with radiation         Do you have any questions or concerns regarding your treatment plan? Not at this time.

## 2023-10-25 PROCEDURE — 77412 RADIATION TX DELIVERY LVL 3: CPT | Performed by: RADIOLOGY

## 2023-10-26 PROCEDURE — 77412 RADIATION TX DELIVERY LVL 3: CPT | Performed by: RADIOLOGY

## 2023-10-27 PROCEDURE — 77336 RADIATION PHYSICS CONSULT: CPT | Performed by: RADIOLOGY

## 2023-10-27 PROCEDURE — 77412 RADIATION TX DELIVERY LVL 3: CPT | Performed by: INTERNAL MEDICINE

## 2023-10-30 ENCOUNTER — VBI (OUTPATIENT)
Dept: ADMINISTRATIVE | Facility: OTHER | Age: 63
End: 2023-10-30

## 2023-10-30 PROCEDURE — 77427 RADIATION TX MANAGEMENT X5: CPT | Performed by: RADIOLOGY

## 2023-10-30 PROCEDURE — 77412 RADIATION TX DELIVERY LVL 3: CPT | Performed by: RADIOLOGY

## 2023-10-30 PROCEDURE — 77417 THER RADIOLOGY PORT IMAGE(S): CPT | Performed by: RADIOLOGY

## 2023-10-31 PROCEDURE — 77412 RADIATION TX DELIVERY LVL 3: CPT | Performed by: RADIOLOGY

## 2023-11-01 ENCOUNTER — APPOINTMENT (OUTPATIENT)
Dept: RADIATION ONCOLOGY | Facility: CLINIC | Age: 63
End: 2023-11-01
Attending: RADIOLOGY
Payer: COMMERCIAL

## 2023-11-01 PROCEDURE — 77412 RADIATION TX DELIVERY LVL 3: CPT | Performed by: RADIOLOGY

## 2023-11-02 ENCOUNTER — APPOINTMENT (OUTPATIENT)
Dept: RADIATION ONCOLOGY | Facility: CLINIC | Age: 63
End: 2023-11-02
Payer: COMMERCIAL

## 2023-11-02 PROCEDURE — 77412 RADIATION TX DELIVERY LVL 3: CPT | Performed by: RADIOLOGY

## 2023-11-03 ENCOUNTER — APPOINTMENT (OUTPATIENT)
Dept: RADIATION ONCOLOGY | Facility: CLINIC | Age: 63
End: 2023-11-03
Payer: COMMERCIAL

## 2023-11-03 PROCEDURE — 77336 RADIATION PHYSICS CONSULT: CPT | Performed by: RADIOLOGY

## 2023-11-03 PROCEDURE — 77412 RADIATION TX DELIVERY LVL 3: CPT | Performed by: INTERNAL MEDICINE

## 2023-11-06 ENCOUNTER — APPOINTMENT (EMERGENCY)
Dept: NON INVASIVE DIAGNOSTICS | Facility: HOSPITAL | Age: 63
End: 2023-11-06
Payer: COMMERCIAL

## 2023-11-06 ENCOUNTER — APPOINTMENT (OUTPATIENT)
Dept: RADIATION ONCOLOGY | Facility: CLINIC | Age: 63
End: 2023-11-06
Payer: COMMERCIAL

## 2023-11-06 ENCOUNTER — APPOINTMENT (EMERGENCY)
Dept: RADIOLOGY | Facility: HOSPITAL | Age: 63
End: 2023-11-06
Payer: COMMERCIAL

## 2023-11-06 ENCOUNTER — HOSPITAL ENCOUNTER (EMERGENCY)
Facility: HOSPITAL | Age: 63
Discharge: HOME | End: 2023-11-06
Attending: EMERGENCY MEDICINE
Payer: COMMERCIAL

## 2023-11-06 VITALS
RESPIRATION RATE: 16 BRPM | HEART RATE: 88 BPM | SYSTOLIC BLOOD PRESSURE: 195 MMHG | OXYGEN SATURATION: 99 % | DIASTOLIC BLOOD PRESSURE: 81 MMHG | TEMPERATURE: 98.2 F

## 2023-11-06 DIAGNOSIS — M25.562 LEFT KNEE PAIN: ICD-10-CM

## 2023-11-06 DIAGNOSIS — M79.605 LEFT LEG PAIN: Primary | ICD-10-CM

## 2023-11-06 PROCEDURE — 93971 EXTREMITY STUDY: CPT | Performed by: SURGERY

## 2023-11-06 PROCEDURE — 73564 X-RAY EXAM KNEE 4 OR MORE: CPT

## 2023-11-06 PROCEDURE — 93971 EXTREMITY STUDY: CPT

## 2023-11-06 PROCEDURE — 99284 EMERGENCY DEPT VISIT MOD MDM: CPT | Performed by: EMERGENCY MEDICINE

## 2023-11-06 PROCEDURE — 99284 EMERGENCY DEPT VISIT MOD MDM: CPT

## 2023-11-06 RX ORDER — NAPROXEN 500 MG/1
500 TABLET ORAL 2 TIMES DAILY WITH MEALS
Qty: 10 TABLET | Refills: 0 | Status: SHIPPED | OUTPATIENT
Start: 2023-11-06 | End: 2023-11-11

## 2023-11-06 NOTE — Clinical Note
Jai Moreno was seen and treated in our emergency department on 11/6/2023. No work until cleared by Family Doctor/Orthopedics        Diagnosis:     Percy Tyson  . She may return on this date: If you have any questions or concerns, please don't hesitate to call.       Daly Dorado MD    ______________________________           _______________          _______________  Hospital Representative                              Date                                Time

## 2023-11-06 NOTE — ED PROVIDER NOTES
History  Chief Complaint   Patient presents with    Leg Pain     Pt reports left leg pain and swelling for 3 weeks. Pt reports she's is getting radiation for breast cancer and pain started after that. History provided by:  Patient   used: No    Leg Pain  Location:  Leg  Time since incident:  3 weeks  Injury: no    Leg location:  L leg  Pain details:     Quality:  Aching    Radiates to:  Does not radiate    Severity:  Moderate    Onset quality:  Gradual    Duration:  3 weeks    Timing:  Intermittent    Progression:  Waxing and waning  Chronicity:  New  Dislocation: no    Foreign body present:  Unable to specify  Tetanus status:  Unknown  Prior injury to area:  Unable to specify  Relieved by:  Nothing  Worsened by:  Nothing  Ineffective treatments:  None tried  Associated symptoms: stiffness and swelling    Associated symptoms: no back pain, no decreased ROM, no fever, no muscle weakness, no neck pain and no numbness    Swelling:     Location:  Leg (left knee)    Onset quality:  Gradual    Duration:  3 weeks    Timing:  Intermittent    Progression:  Unchanged    Chronicity:  New  Risk factors comment:  Breast cancer with  ongoing radation      Prior to Admission Medications   Prescriptions Last Dose Informant Patient Reported?  Taking?   acetaminophen (TYLENOL) 650 mg CR tablet  Self No Yes   Sig: Take 1 tablet (650 mg total) by mouth every 8 (eight) hours as needed for mild pain   albuterol (Ventolin HFA) 90 mcg/act inhaler  Self No Yes   Sig: Inhale 2 puffs every 6 (six) hours as needed for wheezing   anastrozole (ARIMIDEX) 1 mg tablet  Self No Yes   Sig: Take 1 tablet (1 mg total) by mouth daily   atorvastatin (LIPITOR) 10 mg tablet   No Yes   Sig: Take 1 tablet (10 mg total) by mouth daily   buPROPion (Wellbutrin XL) 150 mg 24 hr tablet   No Yes   Sig: Take 1 tablet (150 mg total) by mouth every morning   hydrochlorothiazide (HYDRODIURIL) 12.5 mg tablet Not Taking Self No No   Sig: Take 1 tablet (12.5 mg total) by mouth daily   Patient not taking: Reported on 7/11/2023   ibuprofen (MOTRIN) 200 mg tablet  Self Yes Yes   Sig: Take 200-800 mg by mouth every 6 (six) hours as needed for mild pain   irbesartan (AVAPRO) 300 mg tablet   No Yes   Sig: Take 1 tablet (300 mg total) by mouth daily at bedtime   ondansetron (ZOFRAN) 4 mg tablet Not Taking Self No No   Sig: Take 1 tablet (4 mg total) by mouth every 8 (eight) hours as needed for nausea or vomiting   Patient not taking: Reported on 7/11/2023   sertraline (ZOLOFT) 50 mg tablet  Self No Yes   Sig: Take 1 tablet (50 mg total) by mouth daily   traMADol (ULTRAM) 50 mg tablet Not Taking Self No No   Sig: Take 1 tablet (50 mg total) by mouth every 8 (eight) hours as needed for moderate pain   Patient not taking: Reported on 6/13/2023      Facility-Administered Medications: None       Past Medical History:   Diagnosis Date    Anxiety     Arthritis     right foot/ back./ hands    Breast cancer ([address])     Cellulitis 09/13/2019    COVID     x 2--3/7/22 and approx Sept 2022.  recovered @ home    Heartburn     Hypertension     Hypokalemia 04/01/2020    Pneumonia of both lungs due to infectious organism 08/19/2019    pt denies    Tobacco abuse     Wears dentures     full uppers    Wears glasses        Past Surgical History:   Procedure Laterality Date    APPENDECTOMY      APPENDISITIS SURGERY AGE 19    BREAST BIOPSY Right 01/31/2023    BREAST LUMPECTOMY Right 3/28/2023    Procedure: LUMPECTOMY BREAST MARY  LOCALIZED;  Surgeon: Tash Gomez MD;  Location[facility];  Service: Surgical Oncology    LYMPH NODE BIOPSY Right 3/28/2023    Procedure: BIOPSY LYMPH NODE, SENTINEL LYMPHOSCINTIGRAPHY, LYMPHATIC MAPPING;  Surgeon: Tash Gomez MD;  Location[facility];  Service: Surgical Oncology    US GUIDED BREAST BIOPSY RIGHT COMPLETE Right 01/31/2023       Family History   Problem Relation Age of Onset    COPD Mother     No Known Problems Father     No Known Problems Brother     No Known Problems Brother     No Known Problems Brother     No Known Problems Daughter     No Known Problems Maternal Aunt     Lung cancer Maternal Uncle         age at dx unk    Ovarian cancer Maternal Grandmother         age at dx unk    Prostate cancer Maternal Grandfather         age at dx unk    No Known Problems Paternal Grandmother     No Known Problems Paternal Grandfather     Breast cancer Neg Hx     Colon cancer Neg Hx      I have reviewed and agree with the history as documented. E-Cigarette/Vaping    E-Cigarette Use Never User      E-Cigarette/Vaping Substances    Nicotine No     THC No     CBD No     Flavoring No     Other No     Unknown No      Social History     Tobacco Use    Smoking status: Every Day     Packs/day: 0.03     Years: 45.00     Total pack years: 1.35     Types: Cigarettes     Passive exposure: Never    Smokeless tobacco: Never    Tobacco comments:     States she is smoking 4-5 cig/day   Vaping Use    Vaping Use: Never used   Substance Use Topics    Alcohol use: Not Currently     Alcohol/week: 7.0 standard drinks of alcohol     Types: 7 Glasses of wine per week     Comment: quit 2 years ago Oct 2020    Drug use: Never       Review of Systems   Constitutional:  Negative for chills and fever. HENT:  Negative for facial swelling, sore throat and trouble swallowing. Eyes:  Negative for pain and visual disturbance. Respiratory:  Negative for cough and shortness of breath. Cardiovascular:  Negative for chest pain and leg swelling. Gastrointestinal:  Negative for abdominal pain, diarrhea, nausea and vomiting. Genitourinary:  Negative for dysuria and flank pain. Musculoskeletal:  Positive for stiffness. Negative for back pain, neck pain and neck stiffness. Left knee pain, left leg swelling   Skin:  Negative for pallor and rash. Allergic/Immunologic: Negative for environmental allergies and immunocompromised state.    Neurological:  Negative for dizziness and headaches. Hematological:  Negative for adenopathy. Does not bruise/bleed easily. Psychiatric/Behavioral:  Negative for agitation and behavioral problems. All other systems reviewed and are negative. Physical Exam  Physical Exam  Vitals and nursing note reviewed. Constitutional:       General: She is not in acute distress. Appearance: She is well-developed. HENT:      Head: Normocephalic and atraumatic. Eyes:      Extraocular Movements: Extraocular movements intact. Cardiovascular:      Rate and Rhythm: Normal rate and regular rhythm. Pulmonary:      Effort: Pulmonary effort is normal. No respiratory distress. Abdominal:      Palpations: Abdomen is soft. Tenderness: There is no abdominal tenderness. There is no guarding or rebound. Musculoskeletal:         General: Swelling present. Normal range of motion. Cervical back: Normal range of motion and neck supple. Comments: LLE: Mild swelling involving left knee posteriomedially, no warmth or erythema of left nee, intact ROM, mild swelling of left lower leg, no calf tenderness or erythema, NV intact distally   Skin:     General: Skin is warm and dry. Neurological:      General: No focal deficit present. Mental Status: She is alert and oriented to person, place, and time.    Psychiatric:         Mood and Affect: Mood normal.         Behavior: Behavior normal.         Vital Signs  ED Triage Vitals   Temperature Pulse Respirations Blood Pressure SpO2   11/06/23 1122 11/06/23 1122 11/06/23 1122 11/06/23 1122 11/06/23 1122   98.2 °F (36.8 °C) 82 18 (!) 200/93 98 %      Temp Source Heart Rate Source Patient Position - Orthostatic VS BP Location FiO2 (%)   11/06/23 1122 11/06/23 1122 -- 11/06/23 1122 --   Oral Monitor  Right arm       Pain Score       11/06/23 1204       8           Vitals:    11/06/23 1122 11/06/23 1204 11/06/23 1438   BP: (!) 200/93 (!) 195/81    Pulse: 82  88         Visual Acuity      ED Medications  Medications - No data to display    Diagnostic Studies  Results Reviewed       None                   VAS lower limb venous duplex study, unilateral/limited   Final Result by Gregg Toribio MD ( [id number])      XR knee 4+ views left injury   Final Result by Brendon Gupta MD ([id number])      No acute osseous abnormality. Workstation performed: [id number]                    Procedures  Procedures         ED Course  ED Course as of 23 1202   Mon 2023   1238 XR knee 4+ views left injury  X-ray left knee independently reviewed, no displaced fracture or dislocation noted; official report pending, patient informed. 1304 As per prelim report "Ralph Xie from [address] [place] [address]. Your patient in er 6, Adan Patiño : 62. The venous Doppler left leg is negative for DVT". 1323 Patient informed about the negative duplex report, no acute abn seen on XR, we will give Knee Immob, ortho follow up. SBIRT 20yo+      Flowsheet Row Most Recent Value   Initial Alcohol Screen: US AUDIT-C     1. How often do you have a drink containing alcohol? 0 Filed at: 2023 1256   2. How many drinks containing alcohol do you have on a typical day you are drinking? 0 Filed at: 2023 1256   3a. Male UNDER 65: How often do you have five or more drinks on one occasion? 0 Filed at: 2023 1256   3b. FEMALE Any Age, or MALE 65+: How often do you have 4 or more drinks on one occassion? 0 Filed at: 2023 1256   Audit-C Score 0 Filed at: 2023 1256   CHAYA: How many times in the past year have you. .. Used an illegal drug or used a prescription medication for non-medical reasons?  Never Filed at: 2023 1256                      Medical Decision Making  Patient is a 61-year-old female, history of breast cancer with ongoing radiation, comes in with complaints of left leg/left knee pain going on for past 3 weeks, gradually getting worse for the past few days, patient heard a pop yesterday behind the left knee, denies fall or injuries, no fever or chills, chest pain or dyspnea. On exam, patient is conscious, alert, no acute distress, vital signs noted for elevated blood pressure, patient has history of hypertension, takes meds in the evening, no headache or dizziness, respiratory distress, O2 sats 98% on room air, no tachycardia; lower extremity exam shows mild swelling of the left knee posterior medially, mild swelling of the left upper leg, no calf tenderness, no warmth or erythema of left knee, neurovascular intact distally. Differential diagnosis: Left knee osteoarthritis, ligamentous/meniscus injury, Baker's cyst, DVT, will check x-ray, duplex left lower extremity. Problems Addressed:  Left knee pain: acute illness or injury  Left leg pain: acute illness or injury    Amount and/or Complexity of Data Reviewed  Radiology: ordered and independent interpretation performed. Decision-making details documented in ED Course. Risk  Prescription drug management. Disposition  Final diagnoses:   Left leg pain   Left knee pain     Time reflects when diagnosis was documented in both MDM as applicable and the Disposition within this note       Time User Action Codes Description Comment    11/6/2023 12:45 PM Carley Render Add [K79.680] Left leg pain     11/6/2023 12:45 PM Carley Render Add [J64.323] Left knee pain           ED Disposition       ED Disposition   Discharge    Condition   Stable    Date/Time   Mon Nov 6, 2023 [address] discharge to home/self care.                    Follow-up Information       Follow up With Specialties Details Why Contact Info Additional [address] [facility] Schedule an appointment as soon as possible for a visit   [address] [address] [phone] [address] [place] [address] [address], [address] [place], [place], [address] [phone]            Discharge Medication List as of 11/6/2023  1:31 PM        START taking these medications    Details   naproxen (NAPROSYN) 500 mg tablet Take 1 tablet (500 mg total) by mouth 2 (two) times a day with meals for 5 days, Starting Mon 11/6/2023, Until Sat 11/11/2023, Normal           CONTINUE these medications which have NOT CHANGED    Details   acetaminophen (TYLENOL) 650 mg CR tablet Take 1 tablet (650 mg total) by mouth every 8 (eight) hours as needed for mild pain, Starting Mon 8/30/2021, Normal      albuterol (Ventolin HFA) 90 mcg/act inhaler Inhale 2 puffs every 6 (six) hours as needed for wheezing, Starting Mon 8/30/2021, Normal      anastrozole (ARIMIDEX) 1 mg tablet Take 1 tablet (1 mg total) by mouth daily, Starting Mon 5/15/2023, Normal      atorvastatin (LIPITOR) 10 mg tablet Take 1 tablet (10 mg total) by mouth daily, Starting Mon 7/31/2023, Normal      buPROPion (Wellbutrin XL) 150 mg 24 hr tablet Take 1 tablet (150 mg total) by mouth every morning, Starting Mon 7/31/2023, Normal      ibuprofen (MOTRIN) 200 mg tablet Take 200-800 mg by mouth every 6 (six) hours as needed for mild pain, Historical Med      irbesartan (AVAPRO) 300 mg tablet Take 1 tablet (300 mg total) by mouth daily at bedtime, Starting Mon 7/31/2023, Normal      sertraline (ZOLOFT) 50 mg tablet Take 1 tablet (50 mg total) by mouth daily, Starting Fri 12/23/2022, Normal      hydrochlorothiazide (HYDRODIURIL) 12.5 mg tablet Take 1 tablet (12.5 mg total) by mouth daily, Starting Fri 1/6/2023, Normal      ondansetron (ZOFRAN) 4 mg tablet Take 1 tablet (4 mg total) by mouth every 8 (eight) hours as needed for nausea or vomiting, Starting Wed 4/19/2023, Normal      traMADol (ULTRAM) 50 mg tablet Take 1 tablet (50 mg total) by mouth every 8 (eight) hours as needed for moderate pain, Starting Wed 2/22/2023, Normal             No discharge procedures on file.     PDMP Review         Value Time User    PDMP Reviewed  Yes 2/22/2023  2:01 PM Enrique Osorio MD            ED Provider  Electronically Signed by             Vinita Porter MD  11/07/23 9949

## 2023-11-07 ENCOUNTER — APPOINTMENT (OUTPATIENT)
Dept: RADIATION ONCOLOGY | Facility: CLINIC | Age: 63
End: 2023-11-07
Payer: COMMERCIAL

## 2023-11-07 ENCOUNTER — TELEPHONE (OUTPATIENT)
Dept: RADIATION ONCOLOGY | Facility: CLINIC | Age: 63
End: 2023-11-07

## 2023-11-07 NOTE — TELEPHONE ENCOUNTER
Patient calling in to update status as she cancelled her RT treatment yesterday d/t knee pain. Was seen in ED for same. X-ray and doppler studies completed, both results were negative. States she was told by provider that she is to be homebound on strict rest with elevation with minimal ambulation to bathroom only until she sees orthopedic surgeon on 11/9/23 at 1000. Does not want to come in for treatment until she sees surgeon. Appointments cancelled, Adali Meneses will be notified.   Will follow-up with patient on 11/10/23

## 2023-11-08 ENCOUNTER — APPOINTMENT (OUTPATIENT)
Dept: RADIATION ONCOLOGY | Facility: CLINIC | Age: 63
End: 2023-11-08
Payer: COMMERCIAL

## 2023-11-09 ENCOUNTER — OFFICE VISIT (OUTPATIENT)
Dept: OBGYN CLINIC | Facility: MEDICAL CENTER | Age: 63
End: 2023-11-09
Payer: COMMERCIAL

## 2023-11-09 ENCOUNTER — APPOINTMENT (OUTPATIENT)
Dept: RADIATION ONCOLOGY | Facility: CLINIC | Age: 63
End: 2023-11-09
Payer: COMMERCIAL

## 2023-11-09 ENCOUNTER — VBI (OUTPATIENT)
Dept: ADMINISTRATIVE | Facility: OTHER | Age: 63
End: 2023-11-09

## 2023-11-09 VITALS
DIASTOLIC BLOOD PRESSURE: 96 MMHG | SYSTOLIC BLOOD PRESSURE: 194 MMHG | HEART RATE: 78 BPM | HEIGHT: 63 IN | BODY MASS INDEX: 36.5 KG/M2 | WEIGHT: 206 LBS

## 2023-11-09 DIAGNOSIS — M25.562 ACUTE PAIN OF LEFT KNEE: Primary | ICD-10-CM

## 2023-11-09 DIAGNOSIS — M25.462 EFFUSION OF LEFT KNEE: ICD-10-CM

## 2023-11-09 PROCEDURE — 99203 OFFICE O/P NEW LOW 30 MIN: CPT | Performed by: EMERGENCY MEDICINE

## 2023-11-09 PROCEDURE — 20610 DRAIN/INJ JOINT/BURSA W/O US: CPT | Performed by: EMERGENCY MEDICINE

## 2023-11-09 RX ORDER — LIDOCAINE HYDROCHLORIDE 10 MG/ML
4 INJECTION, SOLUTION INFILTRATION; PERINEURAL
Status: COMPLETED | OUTPATIENT
Start: 2023-11-09 | End: 2023-11-09

## 2023-11-09 RX ORDER — TRIAMCINOLONE ACETONIDE 40 MG/ML
40 INJECTION, SUSPENSION INTRA-ARTICULAR; INTRAMUSCULAR
Status: COMPLETED | OUTPATIENT
Start: 2023-11-09 | End: 2023-11-09

## 2023-11-09 RX ADMIN — LIDOCAINE HYDROCHLORIDE 4 ML: 10 INJECTION, SOLUTION INFILTRATION; PERINEURAL at 10:30

## 2023-11-09 RX ADMIN — TRIAMCINOLONE ACETONIDE 40 MG: 40 INJECTION, SUSPENSION INTRA-ARTICULAR; INTRAMUSCULAR at 10:30

## 2023-11-09 NOTE — LETTER
November 9, 2023     Patient: Kelin Meraz  YOB: 1960  Date of Visit: 11/9/2023      To Whom it May Concern:    Mikala Nicolas is under my professional care. Krystal Aguayo was seen in my office on 11/9/2023. Work excuse from Monday. May return Monday 11/13/23. If you have any questions or concerns, please don't hesitate to call.          Sincerely,          Juan Booth MD        CC: No Recipients

## 2023-11-09 NOTE — PROGRESS NOTES
Assessment/Plan:    Diagnoses and all orders for this visit:    Acute pain of left knee  -     Large joint arthrocentesis: L knee  -     lidocaine (XYLOCAINE) 1 % injection 4 mL  -     triamcinolone acetonide (KENALOG-40) 40 mg/mL injection 40 mg    Effusion of left knee  -     Large joint arthrocentesis: L knee  -     lidocaine (XYLOCAINE) 1 % injection 4 mL  -     triamcinolone acetonide (KENALOG-40) 40 mg/mL injection 40 mg    Patient's knee symptoms most likely related to degenerative changes producing an effusion of the knee and posterior swelling/Baker's cyst.  We have provided her with a corticosteroid injection of the knee. I have provided a work excuse note she may return on Monday as tolerated. If no improvement to consider MRI to evaluate for Baker's cyst versus lateral meniscus tear  Reviewed ER note and Xray results and images    Return in about 4 weeks (around 12/7/2023). Subjective:   Patient ID: Lori Frost is a 59 y.o. female. NP presents for about 3 weeks of Left posterior lateral knee pain onset after undergoing radiation therapy where she was lying on her back with her legs propped up. She also felt a pop of the back of the knee this past weekend. She notes swelling  Evaluated in ER Xrays and U/S   Taking Tylenol   Been out of work since Monday        Review of Systems    The following portions of the patient's chart were reviewed and updated as appropriate:    Allergy:    Allergies   Allergen Reactions    Amlodipine Edema    Lisinopril Cough    Pollen Extract        Medications:    Current Outpatient Medications:     acetaminophen (TYLENOL) 650 mg CR tablet, Take 1 tablet (650 mg total) by mouth every 8 (eight) hours as needed for mild pain, Disp: 30 tablet, Rfl: 3    albuterol (Ventolin HFA) 90 mcg/act inhaler, Inhale 2 puffs every 6 (six) hours as needed for wheezing, Disp: 18 g, Rfl: 1    anastrozole (ARIMIDEX) 1 mg tablet, Take 1 tablet (1 mg total) by mouth daily, Disp: 90 tablet, Rfl: 1    atorvastatin (LIPITOR) 10 mg tablet, Take 1 tablet (10 mg total) by mouth daily, Disp: 90 tablet, Rfl: 1    buPROPion (Wellbutrin XL) 150 mg 24 hr tablet, Take 1 tablet (150 mg total) by mouth every morning, Disp: 90 tablet, Rfl: 1    ibuprofen (MOTRIN) 200 mg tablet, Take 200-800 mg by mouth every 6 (six) hours as needed for mild pain, Disp: , Rfl:     irbesartan (AVAPRO) 300 mg tablet, Take 1 tablet (300 mg total) by mouth daily at bedtime, Disp: 90 tablet, Rfl: 1    naproxen (NAPROSYN) 500 mg tablet, Take 1 tablet (500 mg total) by mouth 2 (two) times a day with meals for 5 days, Disp: 10 tablet, Rfl: 0    sertraline (ZOLOFT) 50 mg tablet, Take 1 tablet (50 mg total) by mouth daily, Disp: 30 tablet, Rfl: 0    hydrochlorothiazide (HYDRODIURIL) 12.5 mg tablet, Take 1 tablet (12.5 mg total) by mouth daily (Patient not taking: Reported on 7/11/2023), Disp: 90 tablet, Rfl: 1    ondansetron (ZOFRAN) 4 mg tablet, Take 1 tablet (4 mg total) by mouth every 8 (eight) hours as needed for nausea or vomiting (Patient not taking: Reported on 7/11/2023), Disp: 30 tablet, Rfl: 1    traMADol (ULTRAM) 50 mg tablet, Take 1 tablet (50 mg total) by mouth every 8 (eight) hours as needed for moderate pain (Patient not taking: Reported on 6/13/2023), Disp: 9 tablet, Rfl: 0  No current facility-administered medications for this visit.     Patient Active Problem List   Diagnosis    Essential hypertension    Chronic right shoulder pain    Encounter for hepatitis C virus screening test for high risk patient    Colon cancer screening    Gait abnormality    Light cigarette smoker (1-9 cigarettes per day)    Seasonal allergic rhinitis due to pollen    Mixed hyperlipidemia    Major depressive disorder    Anxiety    Tobacco abuse    Influenza vaccine refused    Obesity (BMI 35.0-39.9 without comorbidity)    Malignant neoplasm of lower-inner quadrant of right breast of female, estrogen receptor positive     Pre-operative clearance    CKD (chronic kidney disease), stage II    Chemotherapy induced neutropenia     Visit for wound check    Surgical wound, non healing       Objective:  BP (!) 194/96   Pulse 78   Ht 5' 3" (1.6 m)   Wt 93.4 kg (206 lb)   BMI 36.49 kg/m²     Right Knee Exam     Other   Effusion: no effusion present      Left Knee Exam     Tenderness   Left knee tenderness location: posterior. Range of Motion   Extension:  normal   Flexion:  abnormal     Other   Erythema: absent  Sensation: normal  Swelling: mild  Effusion: effusion present          Observations   Left Knee   Positive for effusion. Right Knee   Negative for effusion. Physical Exam  Musculoskeletal:      Right knee: No effusion. Left knee: Effusion present. Neurologic Exam    Large joint arthrocentesis: L knee  Universal Protocol:  Consent: Verbal consent obtained. Risks and benefits: risks, benefits and alternatives were discussed  Consent given by: patient  Time out: Immediately prior to procedure a "time out" was called to verify the correct patient, procedure, equipment, support staff and site/side marked as required. Timeout called at: 11/9/2023 10:42 AM.  Patient understanding: patient states understanding of the procedure being performed  Test results: test results available and properly labeled  Site marked: the operative site was marked  Patient identity confirmed: verbally with patient  Supporting Documentation  Indications: pain   Procedure Details  Location: knee - L knee  Preparation: Patient was prepped and draped in the usual sterile fashion  Needle size: 22 G  Ultrasound guidance: no  Approach: anterolateral  Medications administered: 4 mL lidocaine 1 %; 40 mg triamcinolone acetonide 40 mg/mL    Patient tolerance: patient tolerated the procedure well with no immediate complications  Dressing:  Sterile dressing applied    No erythema of knee(s)          I have personally reviewed pertinent films in PACS.  and I have personally reviewed the written report of the pertinent studies. Past Medical History:   Diagnosis Date    Anxiety     Arthritis     right foot/ back./ hands    Breast cancer ([address])     Cellulitis 09/13/2019    COVID     x 2--3/7/22 and approx Sept 2022.  recovered @ home    Heartburn     Hypertension     Hypokalemia 04/01/2020    Pneumonia of both lungs due to infectious organism 08/19/2019    pt denies    Tobacco abuse     Wears dentures     full uppers    Wears glasses        Past Surgical History:   Procedure Laterality Date    APPENDECTOMY      APPENDISITIS SURGERY AGE 19    BREAST BIOPSY Right 01/31/2023    BREAST LUMPECTOMY Right 3/28/2023    Procedure: LUMPECTOMY BREAST MARY  LOCALIZED;  Surgeon: Tash Gomez MD;  Location: [facility];  Service: Surgical Oncology    LYMPH NODE BIOPSY Right 3/28/2023    Procedure: BIOPSY LYMPH NODE, SENTINEL LYMPHOSCINTIGRAPHY, LYMPHATIC MAPPING;  Surgeon: Tash Gomez MD;  Location[facility];  Service: Surgical Oncology    US GUIDED BREAST BIOPSY RIGHT COMPLETE Right 01/31/2023       Social History     Socioeconomic History    Marital status:      Spouse name: Not on file    Number of children: Not on file    Years of education: Not on file    Highest education level: Not on file   Occupational History    Not on file   Tobacco Use    Smoking status: Every Day     Packs/day: 0.03     Years: 45.00     Total pack years: 1.35     Types: Cigarettes     Passive exposure: Never    Smokeless tobacco: Never    Tobacco comments:     States she is smoking 4-5 cig/day   Vaping Use    Vaping Use: Never used   Substance and Sexual Activity    Alcohol use: Not Currently     Alcohol/week: 7.0 standard drinks of alcohol     Types: 7 Glasses of wine per week     Comment: quit 2 years ago Oct 2020    Drug use: Never    Sexual activity: Not on file   Other Topics Concern    Not on file   Social History Narrative    Not on file     Social Determinants of Health Financial Resource Strain: Not on file   Food Insecurity: Not on file   Transportation Needs: Not on file   Physical Activity: Not on file   Stress: Not on file   Social Connections: Not on file   Intimate Partner Violence: Not on file   Housing Stability: Not on file       Family History   Problem Relation Age of Onset    COPD Mother     No Known Problems Father     No Known Problems Brother     No Known Problems Brother     No Known Problems Brother     No Known Problems Daughter     No Known Problems Maternal Aunt     Lung cancer Maternal Uncle         age at dx unk    Ovarian cancer Maternal Grandmother         age at dx unk    Prostate cancer Maternal Grandfather         age at dx unk    No Known Problems Paternal Grandmother     No Known Problems Paternal Grandfather     Breast cancer Neg Hx     Colon cancer Neg Hx

## 2023-11-09 NOTE — PATIENT INSTRUCTIONS
You may take Tylenol 500mg every 4-6 hours as needed OR max 1,000mg per dose up to 3 times per day for a total of 3,000mg per day    In order to minimize further degenerative changes and pain from arthritis we recommend maintaining an active lifestyle and continually trying to maintain a healthy weight / BMI (Body Mass Index). If you are interested we can refer you to Weight Management to help with weight loss. I recommended avoiding any tobacco use. On rainy days and cold days you may have worsening pain than baseline. [facility] [facility] org for more information     Steroid Joint Injection   AMBULATORY CARE:   What you need to know about steroid joint injection:  A steroid joint injection is a procedure to inject steroid medicine into a joint. Steroid medicine decreases pain and inflammation. The injection may also contain an anesthetic (numbing medicine) to decrease pain. It may be done to treat conditions such as arthritis, gout, or carpal tunnel syndrome. The injections may be given in your knee, ankle, shoulder, elbow, wrist, or ankle. How to prepare for steroid joint injection:  Your healthcare provider will talk to you about how to prepare for this procedure. He will tell you what medicines to take or not take on the day of your procedure. You may need to stop taking blood thinners several days before your procedure. What will happen during steroid joint injection:  You may be given local anesthesia to numb the area where the injection will be given. With local anesthesia, you may still feel pressure during the procedure, but you should not feel any pain. Your healthcare provider may use ultrasound or fluoroscopy (a type of x-ray) to guide the needle to the right area. He will then inject the steroid into your joint. A bandage will be placed on the injection site.    What will happen after steroid joint injection:  You may have redness, warmth, or sweating in your face and chest right after the steroid injection. Steroids can affect blood sugar levels. If you have diabetes, you should check your blood sugars closely in the first 24 hours after your procedure. Risks of steroid joint injection:  You may get an infection in your joint. The injection may also cause more pain during the first 24 to 36 hours. You may need more than one injection to feel pain relief. The skin near the injection site may be damaged and become discolored or indented. This can happen if the steroid is placed too close to your skin. A tendon near the injection site may rupture or a nerve can be damaged. Contact your healthcare provider if:   You have fever or chills. You have redness or swelling in the injection site. You have more pain than usual in your joint for more than 72 hours. You have questions or concerns about your condition or care. Medicines:   Pain medicine  may be given. Ask how to take this medicine safely. Take your medicine as directed. Contact your healthcare provider if you think your medicine is not helping or if you have side effects. Tell him or her if you are allergic to any medicine. Keep a list of the medicines, vitamins, and herbs you take. Include the amounts, and when and why you take them. Bring the list or the pill bottles to follow-up visits. Carry your medicine list with you in case of an emergency. Self-care:   Leave the bandage on for 8 to 12 hours. Care for your wound as directed. Rest the area  as directed. You may need to decrease weight on certain joints, such as the knee, for a period of time. Ask when you can return to your daily activities. Elevate  your limb where the steroid injection was given. Elevate the limb above the level of your heart as often as you can. This will help decrease swelling and pain. Prop your limb on pillows or blankets to keep it elevated comfortably. Apply ice  on your joint for 15 to 20 minutes every hour or as directed.  Use an ice pack, or put crushed ice in a plastic bag. Cover it with a towel. Ice helps prevent tissue damage and decreases swelling and pain. Follow up with your healthcare provider as directed:  Write down your questions so you remember to ask them during your visits. © 2017 [facility] [employer] Information is for End User's use only and may not be sold, redistributed or otherwise used for commercial purposes. All illustrations and images included in CareNotes® are the copyrighted property of [employer] or [employer]. The above information is an  only. It is not intended as medical advice for individual conditions or treatments. Talk to your doctor, nurse or pharmacist before following any medical regimen to see if it is safe and effective for you. Arthritis   AMBULATORY CARE:   Arthritis  is a disease that causes inflammation in one or more joints. There are many types of arthritis, such as osteoarthritis, rheumatoid arthritis, and septic arthritis. Some types cause inflammation in the joints. Other types wear away the cartilage between joints. This makes the bones of the joint rub together when you move the joint. Your symptoms may be constant, or symptoms may come and go. Arthritis often gets worse over time and can cause permanent joint damage. Common signs and symptoms of arthritis:   Pain, swelling, or stiffness in the joint    Limited range of motion in the joint    Warmth or redness over the joint    Tenderness when you touch the joint    Stiff joints in the morning that loosen with movement    A creaking or grinding sound when you move the joint    Fever  Seek care immediately if:   You have a fever and severe joint pain or swelling. You cannot move the affected joint. You have severe joint pain you cannot tolerate. Contact your healthcare provider if:   Your pain or swelling does not get better with treatment.     You have questions or concerns about your condition or care.  Treatment  will depend on the type of arthritis you have and if it is severe. You may need any of the following:  Acetaminophen  decreases pain and fever. It is available without a doctor's order. Ask how much to take and how often to take it. Follow directions. Acetaminophen can cause liver damage if not taken correctly. NSAIDs , such as ibuprofen, help decrease swelling, pain, and fever. This medicine is available with or without a doctor's order. NSAIDs can cause stomach bleeding or kidney problems in certain people. If you take blood thinner medicine, always ask your healthcare provider if NSAIDs are safe for you. Always read the medicine label and follow directions. Steroid medicine  helps reduce swelling and pain. Surgery  may be needed to repair or replace a damaged joint. Manage arthritis:   Rest your painful joint so it can heal.  Your healthcare provider may recommend crutches or a walker if the affected joint is in a leg. Apply ice or heat to the joint. Both can help decrease swelling and pain. Ice may also help prevent tissue damage. Use an ice pack, or put crushed ice in a plastic bag. Cover it with a towel and place it on your joint for 15 to 20 minutes every hour or as directed. You can apply heat for 20 minutes every 2 hours. Heat treatment includes hot packs or heat lamps. Elevate your joint. Elevation helps reduce swelling and pain. Raise your joint above the level of your heart as often as you can. Prop your painful joint on pillows to keep it above your heart comfortably. Go to physical or occupational therapy as directed. A physical therapist can teach you exercises to improve flexibility and range of motion. You may also be shown non-weight-bearing exercises that are safe for your joints, such as swimming. Exercise can help keep your joints flexible and reduce pain.  An occupational therapist can help you learn to do your daily activities when your joints are stiff or sore. Maintain a healthy weight. Extra weight puts increased pressure on your joints. Ask your healthcare provider what you should weigh. If you need to lose weight, he can help you create a weight loss program. Weight loss can help reduce pain and increase your ability to do your activities. The amount of exercise you do may vary each day, depending on your symptoms. Wear flat or low-heeled shoes. This will help decrease pain and reduce pressure on your ankle, knee, and hip joints. Use support devices as directed. You may be given splints to wear on your hands to help your joints rest and to decrease inflammation. While you sleep, use a pillow that is firm enough to support your neck and head. Other equipment  that may help you move and prevent falls:  Orthotic shoes or insoles  help support your feet when you walk. Crutches, a cane, or a walker  may help decrease your risk for falling. They also decrease stress on affected joints. Devices to prevent falls  include raised toilet seats and bathtub bars to help you get up from sitting. Handrails can be placed in areas where you need balance and support. Follow up with your healthcare provider or rheumatologist as directed:  Write down your questions so you remember to ask them during your visits. © 2017 [facility] Information is for End User's use only and may not be sold, redistributed or otherwise used for commercial purposes. All illustrations and images included in CareNotes® are the copyrighted property of [employer]. or [employer]. The above information is an  only. It is not intended as medical advice for individual conditions or treatments. Talk to your doctor, nurse or pharmacist before following any medical regimen to see if it is safe and effective for you.

## 2023-11-10 ENCOUNTER — TELEPHONE (OUTPATIENT)
Dept: RADIATION ONCOLOGY | Facility: CLINIC | Age: 63
End: 2023-11-10

## 2023-11-10 ENCOUNTER — TELEPHONE (OUTPATIENT)
Dept: HEMATOLOGY ONCOLOGY | Facility: CLINIC | Age: 63
End: 2023-11-10

## 2023-11-10 ENCOUNTER — APPOINTMENT (OUTPATIENT)
Dept: RADIATION ONCOLOGY | Facility: CLINIC | Age: 63
End: 2023-11-10
Payer: COMMERCIAL

## 2023-11-10 DIAGNOSIS — C50.311 MALIGNANT NEOPLASM OF LOWER-INNER QUADRANT OF RIGHT BREAST OF FEMALE, ESTROGEN RECEPTOR POSITIVE: ICD-10-CM

## 2023-11-10 DIAGNOSIS — Z17.0 MALIGNANT NEOPLASM OF LOWER-INNER QUADRANT OF RIGHT BREAST OF FEMALE, ESTROGEN RECEPTOR POSITIVE: ICD-10-CM

## 2023-11-10 RX ORDER — ANASTROZOLE 1 MG/1
1 TABLET ORAL DAILY
Qty: 90 TABLET | Refills: 1 | Status: SHIPPED | OUTPATIENT
Start: 2023-11-10

## 2023-11-10 NOTE — TELEPHONE ENCOUNTER
Follow-up call to patient to ascertain how she is feeling and to check when she desires to resume treatment. Seen by orthopedic surgeon yesterday. Received kenalog/lidocaine injection. States she is feeling much better.   Will resume her treatment on Monday, 11/13/23

## 2023-11-10 NOTE — TELEPHONE ENCOUNTER
Medication Refill Request   Who are you speaking with? Patient   If it is not the patient, are they listed on an active communication consent form? Yes   Which medication is being requested for refill? Please list medication name and dosage anastrozole (ARIMIDEX) 1 mg tablet    How many pills does the patient have left? none   Preferred Pharmacy / Address [id number] [place] [address], [address] [address]  Phone: [phone]  Fax: [phone]    Who is the prescribing provider?  Margarette Martins PA-C   Call back number [phone]    Relevant Information refill

## 2023-11-13 ENCOUNTER — APPOINTMENT (OUTPATIENT)
Dept: RADIATION ONCOLOGY | Facility: CLINIC | Age: 63
End: 2023-11-13
Payer: COMMERCIAL

## 2023-11-13 ENCOUNTER — APPOINTMENT (OUTPATIENT)
Dept: RADIATION ONCOLOGY | Facility: CLINIC | Age: 63
End: 2023-11-13
Attending: RADIOLOGY
Payer: COMMERCIAL

## 2023-11-13 PROCEDURE — 77412 RADIATION TX DELIVERY LVL 3: CPT | Performed by: RADIOLOGY

## 2023-11-14 ENCOUNTER — APPOINTMENT (OUTPATIENT)
Dept: RADIATION ONCOLOGY | Facility: CLINIC | Age: 63
End: 2023-11-14
Payer: COMMERCIAL

## 2023-11-14 PROCEDURE — 77412 RADIATION TX DELIVERY LVL 3: CPT | Performed by: RADIOLOGY

## 2023-11-15 ENCOUNTER — APPOINTMENT (OUTPATIENT)
Dept: RADIATION ONCOLOGY | Facility: CLINIC | Age: 63
End: 2023-11-15
Payer: COMMERCIAL

## 2023-11-15 PROCEDURE — 77412 RADIATION TX DELIVERY LVL 3: CPT | Performed by: RADIOLOGY

## 2023-11-16 ENCOUNTER — APPOINTMENT (OUTPATIENT)
Dept: RADIATION ONCOLOGY | Facility: CLINIC | Age: 63
End: 2023-11-16
Payer: COMMERCIAL

## 2023-11-16 PROCEDURE — 77412 RADIATION TX DELIVERY LVL 3: CPT | Performed by: RADIOLOGY

## 2023-11-17 ENCOUNTER — APPOINTMENT (OUTPATIENT)
Dept: RADIATION ONCOLOGY | Facility: CLINIC | Age: 63
End: 2023-11-17
Attending: RADIOLOGY
Payer: COMMERCIAL

## 2023-11-17 ENCOUNTER — APPOINTMENT (OUTPATIENT)
Dept: RADIATION ONCOLOGY | Facility: CLINIC | Age: 63
End: 2023-11-17
Payer: COMMERCIAL

## 2023-11-17 PROCEDURE — 77412 RADIATION TX DELIVERY LVL 3: CPT | Performed by: INTERNAL MEDICINE

## 2023-11-17 PROCEDURE — 77336 RADIATION PHYSICS CONSULT: CPT | Performed by: INTERNAL MEDICINE

## 2023-11-20 ENCOUNTER — APPOINTMENT (OUTPATIENT)
Dept: RADIATION ONCOLOGY | Facility: CLINIC | Age: 63
End: 2023-11-20
Attending: RADIOLOGY
Payer: COMMERCIAL

## 2023-11-20 PROCEDURE — 77280 THER RAD SIMULAJ FIELD SMPL: CPT | Performed by: RADIOLOGY

## 2023-11-20 PROCEDURE — 77412 RADIATION TX DELIVERY LVL 3: CPT | Performed by: RADIOLOGY

## 2023-11-20 PROCEDURE — 77427 RADIATION TX MANAGEMENT X5: CPT | Performed by: RADIOLOGY

## 2023-11-21 ENCOUNTER — APPOINTMENT (OUTPATIENT)
Dept: RADIATION ONCOLOGY | Facility: CLINIC | Age: 63
End: 2023-11-21
Payer: COMMERCIAL

## 2023-11-21 PROCEDURE — 77412 RADIATION TX DELIVERY LVL 3: CPT | Performed by: RADIOLOGY

## 2023-11-22 ENCOUNTER — APPOINTMENT (OUTPATIENT)
Dept: RADIATION ONCOLOGY | Facility: CLINIC | Age: 63
End: 2023-11-22
Payer: COMMERCIAL

## 2023-11-22 PROCEDURE — 77412 RADIATION TX DELIVERY LVL 3: CPT | Performed by: RADIOLOGY

## 2023-11-27 ENCOUNTER — APPOINTMENT (OUTPATIENT)
Dept: RADIATION ONCOLOGY | Facility: CLINIC | Age: 63
End: 2023-11-27
Payer: COMMERCIAL

## 2023-11-27 PROCEDURE — 77412 RADIATION TX DELIVERY LVL 3: CPT | Performed by: RADIOLOGY

## 2023-11-27 PROCEDURE — 77336 RADIATION PHYSICS CONSULT: CPT | Performed by: RADIOLOGY

## 2023-11-30 ENCOUNTER — TELEPHONE (OUTPATIENT)
Dept: HEMATOLOGY ONCOLOGY | Facility: CLINIC | Age: 63
End: 2023-11-30

## 2023-12-05 ENCOUNTER — PATIENT OUTREACH (OUTPATIENT)
Dept: HEMATOLOGY ONCOLOGY | Facility: CLINIC | Age: 63
End: 2023-12-05

## 2023-12-05 NOTE — PROGRESS NOTES
Pt called to verify [facility] for appointment on 12/6/23 and 12/12/23. I did outreach [facility] and they did verify that the Pt is scheduled for transportation for both days. Pt is aware and has no further questions or concerns at this time.

## 2023-12-06 ENCOUNTER — OFFICE VISIT (OUTPATIENT)
Dept: HEMATOLOGY ONCOLOGY | Facility: CLINIC | Age: 63
End: 2023-12-06
Payer: COMMERCIAL

## 2023-12-06 VITALS
TEMPERATURE: 97.2 F | HEART RATE: 97 BPM | SYSTOLIC BLOOD PRESSURE: 128 MMHG | HEIGHT: 63 IN | BODY MASS INDEX: 36.32 KG/M2 | OXYGEN SATURATION: 100 % | RESPIRATION RATE: 16 BRPM | WEIGHT: 205 LBS | DIASTOLIC BLOOD PRESSURE: 70 MMHG

## 2023-12-06 DIAGNOSIS — Z12.11 SCREENING FOR COLON CANCER: ICD-10-CM

## 2023-12-06 DIAGNOSIS — C50.311 MALIGNANT NEOPLASM OF LOWER-INNER QUADRANT OF RIGHT BREAST OF FEMALE, ESTROGEN RECEPTOR POSITIVE: Primary | ICD-10-CM

## 2023-12-06 DIAGNOSIS — Z17.0 MALIGNANT NEOPLASM OF LOWER-INNER QUADRANT OF RIGHT BREAST OF FEMALE, ESTROGEN RECEPTOR POSITIVE: Primary | ICD-10-CM

## 2023-12-06 DIAGNOSIS — Z79.811 LONG TERM CURRENT USE OF AROMATASE INHIBITOR: ICD-10-CM

## 2023-12-06 DIAGNOSIS — F17.200 CURRENT EVERY DAY SMOKER: ICD-10-CM

## 2023-12-06 PROCEDURE — 99214 OFFICE O/P EST MOD 30 MIN: CPT | Performed by: PHYSICIAN ASSISTANT

## 2023-12-06 NOTE — PROGRESS NOTES
Hematology/Oncology Outpatient Follow-up  Nora Hess 59 y.o. female 1960 [id number]    Date:  12/6/2023      Assessment and Plan:    1. Malignant neoplasm of lower-inner quadrant of right breast of female, estrogen receptor positive   81-year-old female presents for follow-up. She was previously following with Dr. Derrell Perez. She is postmenopausal with history of stage IIa right-sided breast cancer, grade 3, ER 90%, IN 20%, HER2 1+ by IHC, Ki-67 was 50 to 55%. She underwent lumpectomy and sentinel lymph node biopsy resulting in CICI. She had 1 dose of chemotherapy in the adjuvant setting, Taxotere and cyclophosphamide with poor tolerance and patient did not wish to continue. She was started on aromatase inhibitor and has been compliant and tolerating this. She also had adjuvant radiation therapy which was completed approximately 2 weeks ago. Physical exam is unremarkable for any concern of recurrent disease. Patient is not taking calcium and vitamin D. She is advised to begin taking at least 2000 IU vitamin D daily as well as calcium 600 mg daily. Will assess vitamin D levels prior to her next visit. She is also recommended to maintain compliance with routine yearly minimum follow-up with PCP    - CBC and differential; Future  - Comprehensive metabolic panel; Future    2. Long term current use of aromatase inhibitor  - DXA bone density spine hip and pelvis; Future  - Vitamin D 25 hydroxy; Future  - CBC and differential; Future  - Comprehensive metabolic panel; Future    3. Current every day smoker  Has been smoking since her teenage years, is now down to 6 cigarettes/day    - CT lung screening program; Future    4. Screening for colon cancer  Patient has never had a colonoscopy. She is agreeable to proceed. Her barrier is transportation. Will reach out to social work. - Ambulatory referral to Gastroenterology;  Future       HPI:  Oncology History   Malignant neoplasm of lower-inner quadrant of right breast of female, estrogen receptor positive    1/31/2023 Biopsy    Right breast biopsy:  - Invasive ductal carcinoma  Grade 3  ER 90%  KY 15%  HER2 negative     2/22/2023 -  Cancer Staged    Staging form: Breast, AJCC 8th Edition  - Clinical stage from 2/22/2023: Stage IIA (cT2, cN0, cM0, G3, ER+, KY+, HER2-) - Signed by Christian Navarrete MD on 2/22/2023  Stage prefix: Initial diagnosis  Method of lymph node assessment: Clinical  Histologic grading system: 3 grade system       3/28/2023 Surgery    Right breast lumpectomy with sentinel lymph node biopsy:  - clear margins  - 0/3 lymph nodes    Dr. Debi العلي     4/17/2023 -  Cancer Staged    Staging form: Breast, AJCC 8th Edition  - Pathologic stage from 4/17/2023: Stage IB (pT2, pN0(sn), cM0, G3, ER+, KY+, HER2-) - Signed by Christian Navarrete MD on 4/17/2023  Stage prefix: Initial diagnosis  Method of lymph node assessment: Shelley lymph node biopsy  Histologic grading system: 3 grade system       5/1/2023 - 5/2/2023 Chemotherapy    Pegfilgrastim-bmez (ZIEXTENZO), 6 mg, Subcutaneous, Once, 1 of 4 cycles  Administration: 6 mg (5/2/2023)  cyclophosphamide (CYTOXAN) IVPB, 600 mg/m2 = 1,170 mg, Intravenous, Once, 1 of 4 cycles  Administration: 1,170 mg (5/1/2023)  DOCEtaxel (TAXOTERE) chemo infusion, 75 mg/m2 = 146.2 mg, Intravenous, Once, 1 of 4 cycles  Administration: 146 mg (5/1/2023)     5/2023 -  Hormone Therapy    Anastrozole  Dr. Bereket Bills     10/12/2023 - 11/27/2023 Radiation    Right whole breast  Dr. Anyi Carter         Interval history:    ROS: Review of Systems   Constitutional:  Negative for activity change, appetite change, chills, fatigue, fever and unexpected weight change. Respiratory:  Negative for cough and shortness of breath. Cardiovascular:  Negative for chest pain, palpitations and leg swelling. Gastrointestinal:  Negative for abdominal pain, constipation, diarrhea, nausea and vomiting.    Genitourinary:  Negative for difficulty urinating, dysuria and hematuria. Musculoskeletal:  Negative for arthralgias. Skin: Negative. Neurological:  Positive for dizziness (occassional with bending down). Negative for weakness, light-headedness, numbness and headaches. Hematological: Negative. Psychiatric/Behavioral: Negative. Past Medical History:   Diagnosis Date    Anxiety     Arthritis     right foot/ back./ hands    Breast cancer ([address])     Cellulitis 09/13/2019    COVID     x 2--3/7/22 and approx Sept 2022.  recovered @ home    Heartburn     Hypertension     Hypokalemia 04/01/2020    Pneumonia of both lungs due to infectious organism 08/19/2019    pt denies    Tobacco abuse     Wears dentures     full uppers    Wears glasses        Past Surgical History:   Procedure Laterality Date    APPENDECTOMY      APPENDISITIS SURGERY AGE 19    BREAST BIOPSY Right 01/31/2023    BREAST LUMPECTOMY Right 3/28/2023    Procedure: LUMPECTOMY BREAST MARY  LOCALIZED;  Surgeon: Mayte Johnson MD;  Location: [facility];  Service: Surgical Oncology    LYMPH NODE BIOPSY Right 3/28/2023    Procedure: BIOPSY LYMPH NODE, SENTINEL LYMPHOSCINTIGRAPHY, LYMPHATIC MAPPING;  Surgeon: Mayte Johnson MD;  Location: [facility];  Service: Surgical Oncology    US GUIDED BREAST BIOPSY RIGHT COMPLETE Right 01/31/2023       Social History     Socioeconomic History    Marital status:      Spouse name: None    Number of children: None    Years of education: None    Highest education level: None   Occupational History    None   Tobacco Use    Smoking status: Every Day     Packs/day: 0.03     Years: 45.00     Total pack years: 1.35     Types: Cigarettes     Passive exposure: Never    Smokeless tobacco: Never    Tobacco comments:     States she is smoking 4-5 cig/day   Vaping Use    Vaping Use: Never used   Substance and Sexual Activity    Alcohol use: Not Currently     Alcohol/week: 7.0 standard drinks of alcohol     Types: 7 Glasses of wine per week     Comment: quit 2 years ago Oct 2020    Drug use: Never    Sexual activity: None   Other Topics Concern    None   Social History Narrative    None     Social Determinants of Health     Financial Resource Strain: Not on file   Food Insecurity: Not on file   Transportation Needs: Not on file   Physical Activity: Not on file   Stress: Not on file   Social Connections: Not on file   Intimate Partner Violence: Not on file   Housing Stability: Not on file       Family History   Problem Relation Age of Onset    COPD Mother     No Known Problems Father     No Known Problems Brother     No Known Problems Brother     No Known Problems Brother     No Known Problems Daughter     No Known Problems Maternal Aunt     Lung cancer Maternal Uncle         age at dx unk    Ovarian cancer Maternal Grandmother         age at dx unk    Prostate cancer Maternal Grandfather         age at dx unk    No Known Problems Paternal Grandmother     No Known Problems Paternal Grandfather     Breast cancer Neg Hx     Colon cancer Neg Hx        Allergies   Allergen Reactions    Amlodipine Edema    Lisinopril Cough    Pollen Extract          Current Outpatient Medications:     acetaminophen (TYLENOL) 650 mg CR tablet, Take 1 tablet (650 mg total) by mouth every 8 (eight) hours as needed for mild pain, Disp: 30 tablet, Rfl: 3    albuterol (Ventolin HFA) 90 mcg/act inhaler, Inhale 2 puffs every 6 (six) hours as needed for wheezing, Disp: 18 g, Rfl: 1    anastrozole (ARIMIDEX) 1 mg tablet, Take 1 tablet (1 mg total) by mouth daily, Disp: 90 tablet, Rfl: 1    atorvastatin (LIPITOR) 10 mg tablet, Take 1 tablet (10 mg total) by mouth daily, Disp: 90 tablet, Rfl: 1    buPROPion (Wellbutrin XL) 150 mg 24 hr tablet, Take 1 tablet (150 mg total) by mouth every morning, Disp: 90 tablet, Rfl: 1    ibuprofen (MOTRIN) 200 mg tablet, Take 200-800 mg by mouth every 6 (six) hours as needed for mild pain, Disp: , Rfl:     irbesartan (AVAPRO) 300 mg tablet, Take 1 tablet (300 mg total) by mouth daily at bedtime, Disp: 90 tablet, Rfl: 1    sertraline (ZOLOFT) 50 mg tablet, Take 1 tablet (50 mg total) by mouth daily, Disp: 30 tablet, Rfl: 0    hydrochlorothiazide (HYDRODIURIL) 12.5 mg tablet, Take 1 tablet (12.5 mg total) by mouth daily (Patient not taking: Reported on 7/11/2023), Disp: 90 tablet, Rfl: 1    naproxen (NAPROSYN) 500 mg tablet, Take 1 tablet (500 mg total) by mouth 2 (two) times a day with meals for 5 days, Disp: 10 tablet, Rfl: 0    ondansetron (ZOFRAN) 4 mg tablet, Take 1 tablet (4 mg total) by mouth every 8 (eight) hours as needed for nausea or vomiting (Patient not taking: Reported on 7/11/2023), Disp: 30 tablet, Rfl: 1    traMADol (ULTRAM) 50 mg tablet, Take 1 tablet (50 mg total) by mouth every 8 (eight) hours as needed for moderate pain (Patient not taking: Reported on 6/13/2023), Disp: 9 tablet, Rfl: 0      Physical Exam:  /70 (BP Location: Left arm, Patient Position: Sitting, Cuff Size: Adult)   Pulse 97   Temp (!) 97.2 °F (36.2 °C) (Temporal)   Resp 16   Ht 5' 3" (1.6 m)   Wt 93 kg (205 lb)   SpO2 100%   BMI 36.31 kg/m²     Physical Exam  Vitals reviewed. Constitutional:       General: She is not in acute distress. Appearance: She is well-developed. She is not ill-appearing. HENT:      Head: Normocephalic and atraumatic. Eyes:      General: No scleral icterus. Conjunctiva/sclera: Conjunctivae normal.   Cardiovascular:      Rate and Rhythm: Normal rate and regular rhythm. Heart sounds: Normal heart sounds. No murmur heard. Pulmonary:      Effort: Pulmonary effort is normal. No respiratory distress. Breath sounds: Normal breath sounds. Abdominal:      Palpations: Abdomen is soft. Tenderness: There is no abdominal tenderness. Musculoskeletal:         General: No tenderness. Normal range of motion. Cervical back: Normal range of motion and neck supple. Right lower leg: No edema. Left lower leg: No edema.    Lymphadenopathy: Cervical: No cervical adenopathy. Skin:     General: Skin is warm and dry. Neurological:      Mental Status: She is alert and oriented to person, place, and time. Cranial Nerves: No cranial nerve deficit. Psychiatric:         Mood and Affect: Mood normal.         Behavior: Behavior normal.         Labs:  Lab Results   Component Value Date    WBC 6.08 04/27/2023    HGB 12.2 04/27/2023    HCT 38.2 04/27/2023    MCV 91 04/27/2023     04/27/2023     I have spent 30 minutes with Patient  today in which greater than 50% of this time was spent in counseling/coordination of care regarding Diagnostic results, Risks and benefits of tx options, Patient and family education, Impressions, Documenting in the medical record, Reviewing / ordering tests, medicine, procedures  , and Obtaining or reviewing history  . Patient voiced understanding and agreement in the above discussion. Aware to contact our office with questions/symptoms in the interim. This note has been generated by voice recognition software system. Therefore, there may be spelling, grammar, and or syntax errors. Please contact if questions arise.

## 2023-12-15 ENCOUNTER — VBI (OUTPATIENT)
Dept: ADMINISTRATIVE | Facility: OTHER | Age: 63
End: 2023-12-15

## 2024-01-04 ENCOUNTER — OFFICE VISIT (OUTPATIENT)
Dept: SURGICAL ONCOLOGY | Facility: CLINIC | Age: 64
End: 2024-01-04
Payer: COMMERCIAL

## 2024-01-04 VITALS
HEART RATE: 75 BPM | SYSTOLIC BLOOD PRESSURE: 148 MMHG | DIASTOLIC BLOOD PRESSURE: 78 MMHG | BODY MASS INDEX: 37.07 KG/M2 | WEIGHT: 209.2 LBS | OXYGEN SATURATION: 97 % | TEMPERATURE: 97.7 F | HEIGHT: 63 IN

## 2024-01-04 DIAGNOSIS — Z79.811 USE OF ANASTROZOLE: ICD-10-CM

## 2024-01-04 DIAGNOSIS — Z17.0 MALIGNANT NEOPLASM OF LOWER-INNER QUADRANT OF RIGHT BREAST OF FEMALE, ESTROGEN RECEPTOR POSITIVE: Primary | ICD-10-CM

## 2024-01-04 DIAGNOSIS — C50.311 MALIGNANT NEOPLASM OF LOWER-INNER QUADRANT OF RIGHT BREAST OF FEMALE, ESTROGEN RECEPTOR POSITIVE: Primary | ICD-10-CM

## 2024-01-04 DIAGNOSIS — Z12.4 ENCOUNTER FOR SCREENING FOR CERVICAL CANCER: ICD-10-CM

## 2024-01-04 PROCEDURE — 99214 OFFICE O/P EST MOD 30 MIN: CPT | Performed by: NURSE PRACTITIONER

## 2024-01-04 NOTE — PROGRESS NOTES
Assessment/Plan:    Diagnoses and all orders for this visit:    Malignant neoplasm of lower-inner quadrant of right breast of female, estrogen receptor positive   -     Mammo diagnostic bilateral w 3d & cad; Future  -     Ambulatory referral to oncology social worker; Future    Use of anastrozole    Encounter for screening for cervical cancer  -     Ambulatory referral to Gynecology; Future.    Patient is a 64-year-old female that was diagnosed with a right-sided breast cancer in January 2023.  Her pathology revealed invasive ductal carcinoma, ER 90%, PA 15%, HER2 negative.  She underwent a right lumpectomy and sentinel node biopsy with Dr. Pena.  She received 1 dose of chemotherapy and discontinued secondary to poor tolerance.  She developed wound healing problems requiring care at the wound center.  She completed adjuvant radiation therapy and is currently maintained on anastrozole.  Breast cancer survivorship visit performed today and treatment summary and care plan were reviewed with patient.  I will make arrangements for her mammogram- will postpone until April as she recently finished RT. She is scheduled for a DEXA scan and has been referred to GI for colorectal cancer screening. She has not had a recent gyn exam- referral placed. We discussed the [employer] program- patient is interested but wants to secure transportation first.  She will contact me if she is ready for a referral.  We will plan to see her back in 6 months for a routine follow-up visit or sooner should the need arise.  She was instructed to contact us with any changes or concerns in the interim.  All of her questions were answered today.    REASON FOR VISIT:   Survivorship      Previous therapy:  Oncology History   Malignant neoplasm of lower-inner quadrant of right breast of female, estrogen receptor positive    1/31/2023 Biopsy    Right breast biopsy:  - Invasive ductal carcinoma  Grade 3  ER 90%  PA 15%  HER2 negative     2/22/2023 -   Cancer Staged    Staging form: Breast, AJCC 8th Edition  - Clinical stage from 2/22/2023: Stage IIA (cT2, cN0, cM0, G3, ER+, TN+, HER2-) - Signed by Sarah Pena MD on 2/22/2023  Stage prefix: Initial diagnosis  Method of lymph node assessment: Clinical  Histologic grading system: 3 grade system       3/28/2023 Surgery    Right breast lumpectomy with sentinel lymph node biopsy:  - clear margins  - 0/3 lymph nodes    Dr. Pena     4/17/2023 -  Cancer Staged    Staging form: Breast, AJCC 8th Edition  - Pathologic stage from 4/17/2023: Stage IB (pT2, pN0(sn), cM0, G3, ER+, TN+, HER2-) - Signed by Sarah Pena MD on 4/17/2023  Stage prefix: Initial diagnosis  Method of lymph node assessment: [place] lymph node biopsy  Histologic grading system: 3 grade system       5/1/2023 - 5/2/2023 Chemotherapy    Pegfilgrastim-bmez (ZIEXTENZO), 6 mg, Subcutaneous, Once, 1 of 4 cycles  Administration: 6 mg (5/2/2023)  cyclophosphamide (CYTOXAN) IVPB, 600 mg/m2 = 1,170 mg, Intravenous, Once, 1 of 4 cycles  Administration: 1,170 mg (5/1/2023)  DOCEtaxel (TAXOTERE) chemo infusion, 75 mg/m2 = 146.2 mg, Intravenous, Once, 1 of 4 cycles  Administration: 146 mg (5/1/2023)     5/2023 -  Hormone Therapy    Anastrozole  Dr. Tamez     10/12/2023 - 11/27/2023 Radiation    Right whole breast  Dr. Bauman     10/23/2023 - 11/27/2023 Radiation      Plan ID Energy Fractions Dose per Fraction (cGy) Dose Correction (cGy) Total Dose Delivered (cGy) Elapsed Days   R Boost 10X/6X 4 / 4 250 0 1,000 7   R Breast 10X/6X 15 / 15 267 0 4,005 25      Treatment dates:  C1: 10/23/2023 - 11/27/2023               Patient ID: Corina Sena is a 64 y.o. female  Presenting today for a breast cancer survivorship visit.  She visited the wound center and her wound healed nicely.  She then completed radiation therapy.  She continues on anastrozole.  She denies persistent headaches, back pain or bone pain, cough or shortness of breath, abdominal pain.          Review  "of Systems   Constitutional:  Negative for activity change, appetite change, chills, fatigue, fever and unexpected weight change.   Respiratory:  Negative for cough and shortness of breath.    Cardiovascular:  Negative for chest pain.   Gastrointestinal:  Negative for abdominal pain, constipation, diarrhea, nausea and vomiting.   Musculoskeletal:  Negative for arthralgias, back pain, gait problem and myalgias.   Skin:  Negative for color change and rash.   Neurological:  Negative for dizziness and headaches.   Hematological:  Negative for adenopathy.   Psychiatric/Behavioral:  Negative for agitation and confusion.    All other systems reviewed and are negative.      Objective:    Blood pressure 148/78, pulse 75, temperature 97.7 °F (36.5 °C), temperature source Temporal, height 5' 3\" (1.6 m), weight 94.9 kg (209 lb 3.2 oz), SpO2 97%.  Body mass index is 37.06 kg/m².      Physical Exam  Vitals reviewed.   Constitutional:       General: She is not in acute distress.     Appearance: Normal appearance. She is well-developed. She is not diaphoretic.   HENT:      Head: Normocephalic and atraumatic.   Cardiovascular:      Rate and Rhythm: Normal rate and regular rhythm.      Heart sounds: Normal heart sounds.   Pulmonary:      Effort: Pulmonary effort is normal.      Breath sounds: Normal breath sounds.   Chest:   Breasts:     Right: Skin change (surgical scars) present. No swelling, bleeding, inverted nipple, mass, nipple discharge or tenderness.      Left: No swelling, bleeding, inverted nipple, mass, nipple discharge, skin change or tenderness.   Abdominal:      Palpations: Abdomen is soft. There is no mass.      Tenderness: There is no abdominal tenderness.   Musculoskeletal:         General: Normal range of motion.      Cervical back: Normal range of motion.   Lymphadenopathy:      Upper Body:      Right upper body: No supraclavicular or axillary adenopathy.      Left upper body: No supraclavicular or axillary " adenopathy.   Skin:     General: Skin is warm and dry.      Findings: No rash.   Neurological:      Mental Status: She is alert and oriented to person, place, and time.   Psychiatric:         Speech: Speech normal.         Discussed symptoms related to disease recurrence, Yes    Evaluated for late effects related to cancer treatment, Yes, describe: discussed effects of surgery, chemo, RT, AI therapy    Screening current for cervical cancer, No referral to GYN    Screening current for colon cancer, No referred to GI by med onc    Cancer rehabilitation services addressed, Yes, describe: patient needs to find transportation, will contact me for referral at that time    Screening current for osteoporosis, Yes, describe: scheduled for DXA    Oncology Treatment Summary reviewed with patient and copy provided, Yes    Referral placed for psychosocial evaluation/screening to oncology social work  Yes    I have spent 35 minutes with Patient  today in which greater than 50% of this time was spent in counseling/coordination of care regarding breast cancer survivorship.

## 2024-01-10 ENCOUNTER — PATIENT OUTREACH (OUTPATIENT)
Dept: CASE MANAGEMENT | Facility: OTHER | Age: 64
End: 2024-01-10

## 2024-01-10 NOTE — PROGRESS NOTES
OSW received SW referral for survivorship. OSW will place outreach TC to complete the distress thermometer and psychosocial assessment.

## 2024-01-11 ENCOUNTER — PATIENT OUTREACH (OUTPATIENT)
Dept: CASE MANAGEMENT | Facility: OTHER | Age: 64
End: 2024-01-11

## 2024-01-11 ENCOUNTER — HOSPITAL ENCOUNTER (OUTPATIENT)
Dept: RADIOLOGY | Facility: IMAGING CENTER | Age: 64
End: 2024-01-11
Payer: COMMERCIAL

## 2024-01-11 ENCOUNTER — TELEMEDICINE (OUTPATIENT)
Facility: HOSPITAL | Age: 64
End: 2024-01-11

## 2024-01-11 DIAGNOSIS — Z79.811 LONG TERM CURRENT USE OF AROMATASE INHIBITOR: ICD-10-CM

## 2024-01-11 DIAGNOSIS — C50.311 MALIGNANT NEOPLASM OF LOWER-INNER QUADRANT OF RIGHT BREAST OF FEMALE, ESTROGEN RECEPTOR POSITIVE: Primary | ICD-10-CM

## 2024-01-11 DIAGNOSIS — F17.200 CURRENT EVERY DAY SMOKER: ICD-10-CM

## 2024-01-11 DIAGNOSIS — Z17.0 MALIGNANT NEOPLASM OF LOWER-INNER QUADRANT OF RIGHT BREAST OF FEMALE, ESTROGEN RECEPTOR POSITIVE: Primary | ICD-10-CM

## 2024-01-11 PROCEDURE — 77080 DXA BONE DENSITY AXIAL: CPT

## 2024-01-11 PROCEDURE — 71271 CT THORAX LUNG CANCER SCR C-: CPT

## 2024-01-11 PROCEDURE — 99024 POSTOP FOLLOW-UP VISIT: CPT | Performed by: RADIOLOGY

## 2024-01-11 NOTE — PROGRESS NOTES
Biopsychosocial and Barriers Assessment Survivorship     Home/Cell Phone: [phone]  Emergency Contact: Marilyn Meredith-[phone]  Marital Status:   Interpretation concerns, speaks another language, preferred language: English  Cultural concerns: none  Ability to read or write: yes    Housing: Apartment  Home Setup: 1 step to enter  Lives With: independent  Daily Living Activities: independent  Durable Medical Equipment: none  Ambulation: independent    [facility]  Medication coverage: yes    Employment: Employed  [place] Status/Location: n/a  Ability to pay bills: yes  POA/LW/AD: none    Additional Comments:  OSW placed outreach TC to pt this afternoon. OSW introduced self and reason for the call. Pt is a pleasant woman who is in survivorship. She completed a Dt, where she scored a 4/10. She did not express any concerns at this time. Overall she is doing very well.

## 2024-01-11 NOTE — PROGRESS NOTES
Virtual Brief Visit    This Visit is being completed by telephone. The Patient is located at Home and in the following state in which I hold an active license [place]    The patient was identified by name and date of birth. Corina Sena was informed that this is a telemedicine visit and that the visit is being conducted through the Epic Embedded platform. She agrees to proceed..  My office door was closed. No one else was in the room.  She acknowledged consent and understanding of privacy and security of the video platform. The patient has agreed to participate and understands they can discontinue the visit at any time.    Patient is aware this is a billable service.       Assessment/Plan: The patient states that she feels well.  She denies any residual erythema or desquamation.  We discussed continuation of breast massage.  She will follow-up in 6 months.    Problem List Items Addressed This Visit          Other    Malignant neoplasm of lower-inner quadrant of right breast of female, estrogen receptor positive  - Primary       Recent Visits  No visits were found meeting these conditions.  Showing recent visits within past 7 days and meeting all other requirements  Future Appointments  No visits were found meeting these conditions.  Showing future appointments within next 150 days and meeting all other requirements         Visit Time  Total Visit Duration: 15

## 2024-01-11 NOTE — RESULT ENCOUNTER NOTE
Spoke with patient regarding DEXA scan, patient advised to continue vitamin D and calcium and exercise, patient verbalized understanding.

## 2024-01-29 DIAGNOSIS — E78.2 MIXED HYPERLIPIDEMIA: ICD-10-CM

## 2024-01-29 DIAGNOSIS — I10 ESSENTIAL HYPERTENSION: ICD-10-CM

## 2024-01-29 RX ORDER — ATORVASTATIN CALCIUM 10 MG/1
10 TABLET, FILM COATED ORAL DAILY
Qty: 7 TABLET | Refills: 0 | Status: SHIPPED | OUTPATIENT
Start: 2024-01-29 | End: 2024-02-01 | Stop reason: SDUPTHER

## 2024-01-29 RX ORDER — IRBESARTAN 300 MG/1
300 TABLET ORAL
Qty: 7 TABLET | Refills: 0 | Status: SHIPPED | OUTPATIENT
Start: 2024-01-29 | End: 2024-02-01 | Stop reason: SDUPTHER

## 2024-02-01 ENCOUNTER — OFFICE VISIT (OUTPATIENT)
Dept: INTERNAL MEDICINE CLINIC | Facility: OTHER | Age: 64
End: 2024-02-01
Payer: COMMERCIAL

## 2024-02-01 VITALS
HEIGHT: 63 IN | WEIGHT: 210 LBS | HEART RATE: 76 BPM | SYSTOLIC BLOOD PRESSURE: 126 MMHG | OXYGEN SATURATION: 98 % | BODY MASS INDEX: 37.21 KG/M2 | DIASTOLIC BLOOD PRESSURE: 78 MMHG | TEMPERATURE: 97.3 F

## 2024-02-01 DIAGNOSIS — Z72.0 TOBACCO ABUSE: ICD-10-CM

## 2024-02-01 DIAGNOSIS — E78.2 MIXED HYPERLIPIDEMIA: ICD-10-CM

## 2024-02-01 DIAGNOSIS — F33.1 MODERATE EPISODE OF RECURRENT MAJOR DEPRESSIVE DISORDER (HCC): ICD-10-CM

## 2024-02-01 DIAGNOSIS — F41.9 ANXIETY: ICD-10-CM

## 2024-02-01 DIAGNOSIS — E66.9 OBESITY (BMI 35.0-39.9 WITHOUT COMORBIDITY): ICD-10-CM

## 2024-02-01 DIAGNOSIS — N18.2 CKD (CHRONIC KIDNEY DISEASE), STAGE II: Primary | ICD-10-CM

## 2024-02-01 DIAGNOSIS — I10 ESSENTIAL HYPERTENSION: ICD-10-CM

## 2024-02-01 PROBLEM — R26.9 GAIT ABNORMALITY: Status: RESOLVED | Noted: 2019-03-21 | Resolved: 2024-02-01

## 2024-02-01 PROBLEM — T45.1X5A CHEMOTHERAPY INDUCED NEUTROPENIA: Status: RESOLVED | Noted: 2023-04-17 | Resolved: 2024-02-01

## 2024-02-01 PROBLEM — Z91.89 ENCOUNTER FOR HEPATITIS C VIRUS SCREENING TEST FOR HIGH RISK PATIENT: Status: RESOLVED | Noted: 2019-03-05 | Resolved: 2024-02-01

## 2024-02-01 PROBLEM — Z11.59 ENCOUNTER FOR HEPATITIS C VIRUS SCREENING TEST FOR HIGH RISK PATIENT: Status: RESOLVED | Noted: 2019-03-05 | Resolved: 2024-02-01

## 2024-02-01 PROBLEM — T81.89XA SURGICAL WOUND, NON HEALING: Status: RESOLVED | Noted: 2023-08-07 | Resolved: 2024-02-01

## 2024-02-01 PROBLEM — Z51.89 VISIT FOR WOUND CHECK: Status: RESOLVED | Noted: 2023-05-03 | Resolved: 2024-02-01

## 2024-02-01 PROBLEM — Z01.818 PRE-OPERATIVE CLEARANCE: Status: RESOLVED | Noted: 2023-03-07 | Resolved: 2024-02-01

## 2024-02-01 PROBLEM — D70.1 CHEMOTHERAPY INDUCED NEUTROPENIA: Status: RESOLVED | Noted: 2023-04-17 | Resolved: 2024-02-01

## 2024-02-01 PROCEDURE — 99214 OFFICE O/P EST MOD 30 MIN: CPT | Performed by: INTERNAL MEDICINE

## 2024-02-01 RX ORDER — IRBESARTAN 300 MG/1
300 TABLET ORAL
Qty: 30 TABLET | Refills: 0 | Status: CANCELLED | OUTPATIENT
Start: 2024-02-01

## 2024-02-01 RX ORDER — ATORVASTATIN CALCIUM 10 MG/1
10 TABLET, FILM COATED ORAL DAILY
Qty: 30 TABLET | Refills: 0 | Status: SHIPPED | OUTPATIENT
Start: 2024-02-01

## 2024-02-01 RX ORDER — BUPROPION HYDROCHLORIDE 150 MG/1
150 TABLET ORAL EVERY MORNING
Qty: 90 TABLET | Refills: 1 | Status: SHIPPED | OUTPATIENT
Start: 2024-02-01

## 2024-02-01 RX ORDER — IRBESARTAN 300 MG/1
300 TABLET ORAL
Qty: 90 TABLET | Refills: 1 | Status: SHIPPED | OUTPATIENT
Start: 2024-02-01

## 2024-02-01 NOTE — ASSESSMENT & PLAN NOTE
Lab Results   Component Value Date    EGFR 56 04/27/2023    EGFR 61 03/07/2023    EGFR 53 01/03/2023    CREATININE 1.06 04/27/2023    CREATININE 0.99 03/07/2023    CREATININE 1.11 01/03/2023   Continue to trend kidney function.  Avoid nephrotoxic agents.

## 2024-02-01 NOTE — PROGRESS NOTES
Assessment/Plan:    Essential hypertension  Controlled.  Continue irbesartan 300 mg daily.    CKD (chronic kidney disease), stage II  Lab Results   Component Value Date    EGFR 56 04/27/2023    EGFR 61 03/07/2023    EGFR 53 01/03/2023    CREATININE 1.06 04/27/2023    CREATININE 0.99 03/07/2023    CREATININE 1.11 01/03/2023   Continue to trend kidney function.  Avoid nephrotoxic agents.    Obesity (BMI 35.0-39.9 without comorbidity)  Discussed diet and exercise.    Mixed hyperlipidemia  Continue atorvastatin 10 mg daily.  Trend lipid panel.    Major depressive disorder  Stable.  Continue Wellbutrin  mg daily and sertraline 50 mg daily.       Diagnoses and all orders for this visit:    CKD (chronic kidney disease), stage II    Essential hypertension  -     irbesartan (AVAPRO) 300 mg tablet; Take 1 tablet (300 mg total) by mouth daily at bedtime    Mixed hyperlipidemia  Comments:  Started on atorvastatin.  Will recheck lipid panel in 3 months  Orders:  -     atorvastatin (LIPITOR) 10 mg tablet; Take 1 tablet (10 mg total) by mouth daily  -     Lipid panel; Future    Anxiety  -     buPROPion (Wellbutrin XL) 150 mg 24 hr tablet; Take 1 tablet (150 mg total) by mouth every morning  -     sertraline (ZOLOFT) 50 mg tablet; Take 1 tablet (50 mg total) by mouth daily    Moderate episode of recurrent major depressive disorder (HCC)  -     buPROPion (Wellbutrin XL) 150 mg 24 hr tablet; Take 1 tablet (150 mg total) by mouth every morning  -     sertraline (ZOLOFT) 50 mg tablet; Take 1 tablet (50 mg total) by mouth daily    Tobacco abuse  -     buPROPion (Wellbutrin XL) 150 mg 24 hr tablet; Take 1 tablet (150 mg total) by mouth every morning    Obesity (BMI 35.0-39.9 without comorbidity)            Depression Screening and Follow-up Plan: Patient was screened for depression during today's encounter. They screened negative with a PHQ-9 score of 0.         Subjective:      Patient ID: Corina Sena is a 64 y.o.  female.    Chief Complaint   Patient presents with   • Follow-up     overdue f/u & med refills   • HM     Mammo overdue PT Will schedule   PHQ 7.11.24  Snow overdue denies script        Corina Sena is seen today for follow up of chronic conditions.   Recent laboratory studies reviewed today with the patient.   she has been compliant with her medication regimen.   DEXA scan: Normal.  Right-sided breast cancer status post lumpectomy and radiation therapy.  Continue anastrozole.  She follows up with her oncologists regularly.  she has no complaints or concerns at this time.       Hypertension  This is a chronic problem. The current episode started more than 1 year ago. The problem is unchanged. The problem is controlled. Associated symptoms include anxiety. Pertinent negatives include no chest pain, headaches, palpitations or shortness of breath. Past treatments include angiotensin blockers. The current treatment provides moderate improvement. There are no compliance problems.    Hyperlipidemia  This is a chronic problem. The current episode started more than 1 year ago. The problem is controlled. Recent lipid tests were reviewed and are normal. Pertinent negatives include no chest pain or shortness of breath. Current antihyperlipidemic treatment includes statins. The current treatment provides moderate improvement of lipids. There are no compliance problems.    Anxiety  Presents for follow-up visit. Patient reports no chest pain, dizziness, nausea, palpitations, shortness of breath or suicidal ideas. Symptoms occur occasionally. The severity of symptoms is mild. The quality of sleep is good. Nighttime awakenings: none.     Compliance with medications is %.       The following portions of the patient's history were reviewed and updated as appropriate: allergies, current medications, past family history, past medical history, past social history, past surgical history, and problem list.    Review of Systems    Constitutional:  Negative for activity change, appetite change, chills, diaphoresis, fatigue and fever.   HENT:  Negative for congestion, postnasal drip, rhinorrhea, sinus pressure, sinus pain, sneezing and sore throat.    Eyes:  Negative for visual disturbance.   Respiratory:  Negative for apnea, cough, choking, chest tightness, shortness of breath and wheezing.    Cardiovascular:  Negative for chest pain, palpitations and leg swelling.   Gastrointestinal:  Negative for abdominal distention, abdominal pain, anal bleeding, blood in stool, constipation, diarrhea, nausea and vomiting.   Endocrine: Negative for cold intolerance and heat intolerance.   Genitourinary:  Negative for difficulty urinating, dysuria and hematuria.   Musculoskeletal: Negative.    Skin: Negative.    Neurological:  Negative for dizziness, weakness, light-headedness, numbness and headaches.   Hematological:  Negative for adenopathy.   Psychiatric/Behavioral:  Negative for agitation, sleep disturbance and suicidal ideas.    All other systems reviewed and are negative.        Past Medical History:   Diagnosis Date   • Anxiety    • Arthritis     right foot/ back./ hands   • Breast cancer (HCC)    • Cellulitis 09/13/2019   • COVID     x 2--3/7/22 and approx Sept 2022. recovered @ home   • Heartburn    • Hypertension    • Hypokalemia 04/01/2020   • Pneumonia of both lungs due to infectious organism 08/19/2019    pt denies   • Tobacco abuse    • Wears dentures     full uppers   • Wears glasses          Current Outpatient Medications:   •  acetaminophen (TYLENOL) 650 mg CR tablet, Take 1 tablet (650 mg total) by mouth every 8 (eight) hours as needed for mild pain, Disp: 30 tablet, Rfl: 3  •  albuterol (Ventolin HFA) 90 mcg/act inhaler, Inhale 2 puffs every 6 (six) hours as needed for wheezing, Disp: 18 g, Rfl: 1  •  anastrozole (ARIMIDEX) 1 mg tablet, Take 1 tablet (1 mg total) by mouth daily, Disp: 90 tablet, Rfl: 1  •  atorvastatin (LIPITOR) 10 mg  tablet, Take 1 tablet (10 mg total) by mouth daily, Disp: 30 tablet, Rfl: 0  •  buPROPion (Wellbutrin XL) 150 mg 24 hr tablet, Take 1 tablet (150 mg total) by mouth every morning, Disp: 90 tablet, Rfl: 1  •  ibuprofen (MOTRIN) 200 mg tablet, Take 200-800 mg by mouth every 6 (six) hours as needed for mild pain, Disp: , Rfl:   •  irbesartan (AVAPRO) 300 mg tablet, Take 1 tablet (300 mg total) by mouth daily at bedtime, Disp: 90 tablet, Rfl: 1  •  naproxen (NAPROSYN) 500 mg tablet, Take 1 tablet (500 mg total) by mouth 2 (two) times a day with meals for 5 days, Disp: 10 tablet, Rfl: 0  •  sertraline (ZOLOFT) 50 mg tablet, Take 1 tablet (50 mg total) by mouth daily, Disp: 90 tablet, Rfl: 1    Allergies   Allergen Reactions   • Amlodipine Edema   • Lisinopril Cough   • Pollen Extract        Social History   Past Surgical History:   Procedure Laterality Date   • APPENDECTOMY      APPENDISITIS SURGERY AGE 19   • BREAST BIOPSY Right 01/31/2023   • BREAST LUMPECTOMY Right 3/28/2023    Procedure: LUMPECTOMY BREAST MARY  LOCALIZED;  Surgeon: Sarah Pena MD;  Location: [facility];  Service: Surgical Oncology   • LYMPH NODE BIOPSY Right 3/28/2023    Procedure: BIOPSY LYMPH NODE, SENTINEL LYMPHOSCINTIGRAPHY, LYMPHATIC MAPPING;  Surgeon: Sarah Pena MD;  Location: [facility];  Service: Surgical Oncology   • US GUIDED BREAST BIOPSY RIGHT COMPLETE Right 01/31/2023     Family History   Problem Relation Age of Onset   • COPD Mother    • No Known Problems Father    • No Known Problems Brother    • No Known Problems Brother    • No Known Problems Brother    • No Known Problems Daughter    • No Known Problems Maternal Aunt    • Lung cancer Maternal Uncle         age at dx unk   • Ovarian cancer Maternal Grandmother         age at dx unk   • Prostate cancer Maternal Grandfather         age at dx unk   • No Known Problems Paternal Grandmother    • No Known Problems Paternal Grandfather    • Breast cancer Neg Hx    • Colon cancer Neg Hx  "       Objective:  /78 (BP Location: Left arm, Patient Position: Sitting, Cuff Size: Standard)   Pulse 76   Temp (!) 97.3 °F (36.3 °C) (Temporal)   Ht 5' 3\" (1.6 m)   Wt 95.3 kg (210 lb)   SpO2 98%   BMI 37.20 kg/m²     No results found for this or any previous visit (from the past 1344 hour(s)).         Physical Exam  Vitals and nursing note reviewed.   Constitutional:       General: She is not in acute distress.     Appearance: She is well-developed. She is not diaphoretic.   HENT:      Head: Normocephalic and atraumatic.   Eyes:      General:         Right eye: No discharge.         Left eye: No discharge.      Conjunctiva/sclera: Conjunctivae normal.      Pupils: Pupils are equal, round, and reactive to light.   Neck:      Thyroid: No thyromegaly.      Vascular: No JVD.   Cardiovascular:      Rate and Rhythm: Normal rate and regular rhythm.      Heart sounds: Normal heart sounds. No murmur heard.     No friction rub. No gallop.   Pulmonary:      Effort: Pulmonary effort is normal. No respiratory distress.      Breath sounds: Normal breath sounds. No wheezing or rales.   Chest:      Chest wall: No tenderness.   Abdominal:      General: There is no distension.      Palpations: Abdomen is soft.      Tenderness: There is no abdominal tenderness.   Musculoskeletal:         General: No tenderness or deformity. Normal range of motion.      Cervical back: Normal range of motion and neck supple.   Lymphadenopathy:      Cervical: No cervical adenopathy.   Skin:     General: Skin is warm and dry.      Coloration: Skin is not pale.      Findings: No erythema or rash.   Neurological:      Mental Status: She is alert and oriented to person, place, and time.      Cranial Nerves: No cranial nerve deficit.      Coordination: Coordination normal.   Psychiatric:         Behavior: Behavior normal.         Thought Content: Thought content normal.         Judgment: Judgment normal.       "

## 2024-02-21 PROBLEM — Z12.11 COLON CANCER SCREENING: Status: RESOLVED | Noted: 2019-03-05 | Resolved: 2024-02-21

## 2024-03-05 ENCOUNTER — TELEPHONE (OUTPATIENT)
Dept: MAMMOGRAPHY | Facility: CLINIC | Age: 64
End: 2024-03-05

## 2024-03-06 ENCOUNTER — TELEPHONE (OUTPATIENT)
Age: 64
End: 2024-03-06

## 2024-03-06 DIAGNOSIS — E78.2 MIXED HYPERLIPIDEMIA: ICD-10-CM

## 2024-03-06 RX ORDER — ATORVASTATIN CALCIUM 10 MG/1
10 TABLET, FILM COATED ORAL DAILY
Qty: 90 TABLET | Refills: 1 | Status: SHIPPED | OUTPATIENT
Start: 2024-03-06

## 2024-03-27 ENCOUNTER — HOSPITAL ENCOUNTER (OUTPATIENT)
Dept: MAMMOGRAPHY | Facility: CLINIC | Age: 64
Discharge: HOME | End: 2024-03-27
Payer: COMMERCIAL

## 2024-03-27 VITALS — WEIGHT: 210 LBS | BODY MASS INDEX: 37.21 KG/M2 | HEIGHT: 63 IN

## 2024-03-27 DIAGNOSIS — C50.311 MALIGNANT NEOPLASM OF LOWER-INNER QUADRANT OF RIGHT BREAST OF FEMALE, ESTROGEN RECEPTOR POSITIVE (HCC): ICD-10-CM

## 2024-03-27 DIAGNOSIS — Z17.0 MALIGNANT NEOPLASM OF LOWER-INNER QUADRANT OF RIGHT BREAST OF FEMALE, ESTROGEN RECEPTOR POSITIVE (HCC): ICD-10-CM

## 2024-03-27 PROCEDURE — G0279 TOMOSYNTHESIS, MAMMO: HCPCS

## 2024-03-27 PROCEDURE — 77066 DX MAMMO INCL CAD BI: CPT

## 2024-04-04 ENCOUNTER — OFFICE VISIT (OUTPATIENT)
Dept: INTERNAL MEDICINE CLINIC | Facility: OTHER | Age: 64
End: 2024-04-04
Payer: COMMERCIAL

## 2024-04-04 VITALS
DIASTOLIC BLOOD PRESSURE: 80 MMHG | HEART RATE: 77 BPM | OXYGEN SATURATION: 97 % | TEMPERATURE: 98.8 F | WEIGHT: 216.2 LBS | SYSTOLIC BLOOD PRESSURE: 140 MMHG | HEIGHT: 63 IN | BODY MASS INDEX: 38.31 KG/M2

## 2024-04-04 DIAGNOSIS — H10.32 ACUTE CONJUNCTIVITIS OF LEFT EYE, UNSPECIFIED ACUTE CONJUNCTIVITIS TYPE: Primary | ICD-10-CM

## 2024-04-04 PROCEDURE — 99213 OFFICE O/P EST LOW 20 MIN: CPT | Performed by: INTERNAL MEDICINE

## 2024-04-04 NOTE — PROGRESS NOTES
Assessment/Plan:    Acute conjunctivitis of left eye  Will prescribe ketotifen eyedrops.  Deferring antibiotics or steroids at this time.  Discussed droplet precautions.       Diagnoses and all orders for this visit:    Acute conjunctivitis of left eye, unspecified acute conjunctivitis type  -     ketotifen (ZADITOR) 0.025 % ophthalmic solution; Administer 1 drop into the left eye 2 (two) times a day as needed (pink eye)                  Subjective:      Patient ID: Corina Sena is a 64 y.o. female.    Chief Complaint   Patient presents with   • Follow-up     Patient woke up yesterday  with left crusted eye sensitive to light   • Health Screening     Patient  holding off on colonoscopy at the moment       Corina Sena is seen today with concern for acute left eye conjunctivitis.   Symptoms started yesterday. She report clear drainage from the eye. She denies any visual disturbances.         The following portions of the patient's history were reviewed and updated as appropriate: allergies, current medications, past family history, past medical history, past social history, past surgical history, and problem list.    Review of Systems   Constitutional:  Negative for activity change, appetite change, chills, diaphoresis, fatigue and fever.   HENT:  Negative for congestion, postnasal drip, rhinorrhea, sinus pressure, sinus pain, sneezing and sore throat.    Eyes:  Negative for visual disturbance.   Respiratory:  Negative for apnea, cough, choking, chest tightness, shortness of breath and wheezing.    Cardiovascular:  Negative for chest pain, palpitations and leg swelling.   Gastrointestinal:  Negative for abdominal distention, abdominal pain, anal bleeding, blood in stool, constipation, diarrhea, nausea and vomiting.   Endocrine: Negative for cold intolerance and heat intolerance.   Genitourinary:  Negative for difficulty urinating, dysuria and hematuria.   Musculoskeletal: Negative.    Skin: Negative.     Neurological:  Negative for dizziness, weakness, light-headedness, numbness and headaches.   Hematological:  Negative for adenopathy.   Psychiatric/Behavioral:  Negative for agitation, sleep disturbance and suicidal ideas.    All other systems reviewed and are negative.        Past Medical History:   Diagnosis Date   • Anxiety    • Arthritis     right foot/ back./ hands   • Breast cancer (HCC) 03/28/2023    RIGHT   • Cellulitis 09/13/2019   • COVID     x 2--3/7/22 and approx Sept 2022. recovered @ home   • Heartburn    • History of chemotherapy     1 dose only   • History of radiation therapy    • Hypertension    • Hypokalemia 04/01/2020   • Pneumonia of both lungs due to infectious organism 08/19/2019    pt denies   • Tobacco abuse    • Wears dentures     full uppers   • Wears glasses          Current Outpatient Medications:   •  acetaminophen (TYLENOL) 650 mg CR tablet, Take 1 tablet (650 mg total) by mouth every 8 (eight) hours as needed for mild pain, Disp: 30 tablet, Rfl: 3  •  albuterol (Ventolin HFA) 90 mcg/act inhaler, Inhale 2 puffs every 6 (six) hours as needed for wheezing, Disp: 18 g, Rfl: 1  •  anastrozole (ARIMIDEX) 1 mg tablet, Take 1 tablet (1 mg total) by mouth daily, Disp: 90 tablet, Rfl: 1  •  atorvastatin (LIPITOR) 10 mg tablet, Take 1 tablet (10 mg total) by mouth daily, Disp: 90 tablet, Rfl: 1  •  buPROPion (Wellbutrin XL) 150 mg 24 hr tablet, Take 1 tablet (150 mg total) by mouth every morning, Disp: 90 tablet, Rfl: 1  •  ibuprofen (MOTRIN) 200 mg tablet, Take 200-800 mg by mouth every 6 (six) hours as needed for mild pain, Disp: , Rfl:   •  irbesartan (AVAPRO) 300 mg tablet, Take 1 tablet (300 mg total) by mouth daily at bedtime, Disp: 90 tablet, Rfl: 1  •  ketotifen (ZADITOR) 0.025 % ophthalmic solution, Administer 1 drop into the left eye 2 (two) times a day as needed (pink eye), Disp: 5 mL, Rfl: 0  •  sertraline (ZOLOFT) 50 mg tablet, Take 1 tablet (50 mg total) by mouth daily, Disp: 90  "tablet, Rfl: 1    Allergies   Allergen Reactions   • Amlodipine Edema   • Lisinopril Cough   • Pollen Extract        Social History   Past Surgical History:   Procedure Laterality Date   • APPENDECTOMY      APPENDISITIS SURGERY AGE 19   • BREAST BIOPSY Right 01/31/2023   • BREAST LUMPECTOMY Right 3/28/2023    Procedure: LUMPECTOMY BREAST MARY  LOCALIZED;  Surgeon: Sarah Pena MD;  Location: [facility];  Service: Surgical Oncology   • LYMPH NODE BIOPSY Right 3/28/2023    Procedure: BIOPSY LYMPH NODE, SENTINEL LYMPHOSCINTIGRAPHY, LYMPHATIC MAPPING;  Surgeon: Sarah Pena MD;  Location: [facility];  Service: Surgical Oncology   • US GUIDED BREAST BIOPSY RIGHT COMPLETE Right 01/31/2023     Family History   Problem Relation Age of Onset   • COPD Mother    • No Known Problems Father    • No Known Problems Brother    • No Known Problems Brother    • No Known Problems Brother    • No Known Problems Daughter    • No Known Problems Maternal Aunt    • Lung cancer Maternal Uncle         age at dx unk   • Ovarian cancer Maternal Grandmother         age at dx unk   • Prostate cancer Maternal Grandfather         age at dx unk   • No Known Problems Paternal Grandmother    • No Known Problems Paternal Grandfather    • Breast cancer Neg Hx    • Colon cancer Neg Hx        Objective:  /80 (BP Location: Left arm, Patient Position: Sitting, Cuff Size: Large)   Pulse 77   Temp 98.8 °F (37.1 °C) (Temporal)   Ht 5' 3\" (1.6 m)   Wt 98.1 kg (216 lb 3.2 oz)   SpO2 97%   BMI 38.30 kg/m²     No results found for this or any previous visit (from the past 1344 hour(s)).         Physical Exam  Vitals and nursing note reviewed.   Constitutional:       General: She is not in acute distress.     Appearance: She is well-developed. She is not diaphoretic.   HENT:      Head: Normocephalic and atraumatic.   Eyes:      General:         Right eye: No discharge.         Left eye: No discharge.      Conjunctiva/sclera:      Left eye: Left " conjunctiva is injected.      Pupils: Pupils are equal, round, and reactive to light.   Neck:      Thyroid: No thyromegaly.      Vascular: No JVD.   Cardiovascular:      Rate and Rhythm: Normal rate and regular rhythm.      Heart sounds: Normal heart sounds. No murmur heard.     No friction rub. No gallop.   Pulmonary:      Effort: Pulmonary effort is normal. No respiratory distress.      Breath sounds: Normal breath sounds. No wheezing or rales.   Chest:      Chest wall: No tenderness.   Abdominal:      General: There is no distension.      Palpations: Abdomen is soft.      Tenderness: There is no abdominal tenderness.   Musculoskeletal:         General: No tenderness or deformity. Normal range of motion.      Cervical back: Normal range of motion and neck supple.   Lymphadenopathy:      Cervical: No cervical adenopathy.   Skin:     General: Skin is warm and dry.      Coloration: Skin is not pale.      Findings: No erythema or rash.   Neurological:      Mental Status: She is alert and oriented to person, place, and time.      Cranial Nerves: No cranial nerve deficit.      Coordination: Coordination normal.   Psychiatric:         Behavior: Behavior normal.         Thought Content: Thought content normal.         Judgment: Judgment normal.

## 2024-04-04 NOTE — LETTER
April 4, 2024     Patient: Corina Sena  YOB: 1960  Date of Visit: 4/4/2024      To Whom it May Concern:    Corina Sena is under my professional care. Corina was seen in my office on 4/4/2024. Corina may return to work on 4/8/2024 .    If you have any questions or concerns, please don't hesitate to call.         Sincerely,          Cornel Cardenas MD        CC: No Recipients

## 2024-04-04 NOTE — ASSESSMENT & PLAN NOTE
Will prescribe ketotifen eyedrops.  Deferring antibiotics or steroids at this time.  Discussed droplet precautions.

## 2024-05-04 PROBLEM — H10.32 ACUTE CONJUNCTIVITIS OF LEFT EYE: Status: RESOLVED | Noted: 2024-04-04 | Resolved: 2024-05-04

## 2024-06-06 ENCOUNTER — TELEPHONE (OUTPATIENT)
Dept: HEMATOLOGY ONCOLOGY | Facility: MEDICAL CENTER | Age: 64
End: 2024-06-06

## 2024-06-06 ENCOUNTER — VBI (OUTPATIENT)
Dept: ADMINISTRATIVE | Facility: OTHER | Age: 64
End: 2024-06-06

## 2024-06-11 DIAGNOSIS — C50.311 MALIGNANT NEOPLASM OF LOWER-INNER QUADRANT OF RIGHT BREAST OF FEMALE, ESTROGEN RECEPTOR POSITIVE (HCC): ICD-10-CM

## 2024-06-11 DIAGNOSIS — Z17.0 MALIGNANT NEOPLASM OF LOWER-INNER QUADRANT OF RIGHT BREAST OF FEMALE, ESTROGEN RECEPTOR POSITIVE (HCC): ICD-10-CM

## 2024-06-11 RX ORDER — ANASTROZOLE 1 MG/1
1 TABLET ORAL DAILY
Qty: 90 TABLET | Refills: 1 | Status: SHIPPED | OUTPATIENT
Start: 2024-06-11 | End: 2024-06-12 | Stop reason: SDUPTHER

## 2024-06-12 ENCOUNTER — OFFICE VISIT (OUTPATIENT)
Dept: HEMATOLOGY ONCOLOGY | Facility: CLINIC | Age: 64
End: 2024-06-12
Payer: COMMERCIAL

## 2024-06-12 ENCOUNTER — TELEPHONE (OUTPATIENT)
Dept: HEMATOLOGY ONCOLOGY | Facility: CLINIC | Age: 64
End: 2024-06-12

## 2024-06-12 VITALS
BODY MASS INDEX: 37.56 KG/M2 | HEIGHT: 63 IN | OXYGEN SATURATION: 97 % | RESPIRATION RATE: 16 BRPM | DIASTOLIC BLOOD PRESSURE: 70 MMHG | HEART RATE: 85 BPM | WEIGHT: 212 LBS | TEMPERATURE: 97.5 F | SYSTOLIC BLOOD PRESSURE: 128 MMHG

## 2024-06-12 DIAGNOSIS — Z72.0 TOBACCO ABUSE: Primary | ICD-10-CM

## 2024-06-12 DIAGNOSIS — C50.311 MALIGNANT NEOPLASM OF LOWER-INNER QUADRANT OF RIGHT BREAST OF FEMALE, ESTROGEN RECEPTOR POSITIVE (HCC): ICD-10-CM

## 2024-06-12 DIAGNOSIS — Z79.811 USE OF ANASTROZOLE: ICD-10-CM

## 2024-06-12 DIAGNOSIS — Z17.0 MALIGNANT NEOPLASM OF LOWER-INNER QUADRANT OF RIGHT BREAST OF FEMALE, ESTROGEN RECEPTOR POSITIVE (HCC): ICD-10-CM

## 2024-06-12 PROCEDURE — 99214 OFFICE O/P EST MOD 30 MIN: CPT | Performed by: PHYSICIAN ASSISTANT

## 2024-06-12 RX ORDER — ANASTROZOLE 1 MG/1
1 TABLET ORAL DAILY
Qty: 90 TABLET | Refills: 1 | Status: SHIPPED | OUTPATIENT
Start: 2024-06-12

## 2024-06-12 NOTE — TELEPHONE ENCOUNTER
Patient called to see if her [facility] was set up for today. Called and spoke with Conner as there was nothing set up for the patient. Misty is being sent and will  by 9:50am

## 2024-06-12 NOTE — PROGRESS NOTES
Hematology/Oncology Outpatient Follow-up  Corina Sena 64 y.o. female 1960 [id number]  Date:  2024    Assessment and Plan:  1. Malignant neoplasm of lower-inner quadrant of right breast of female, estrogen receptor positive (HCC) 3. Use of anastrozole  61-year-old female presents for follow-up visit regarding history of stage IIa right-sided breast cancer grade 3 ER 90% CT 20% HER2 1+ by IHC, Ki-67 50 to 55%.  She underwent lumpectomy and sentinel lymph node biopsy resulting in CICI  She received 1 dose of chemotherapy in the adjuvant setting: Taxotere cyclophosphamide with poor tolerance and patient did not wish to continue.    She has been on adjuvant aromatase inhibitor and tolerating well.    Continue adjuvant aromatase inhibitor.  Continue calcium and vitamin D.  DEXA scan is up-to-date and without any osteopenia.    Continue follow-up with rad onc and surg onc.  Imaging is up-to-date    Follow-up in 1 year      - anastrozole (ARIMIDEX) 1 mg tablet; Take 1 tablet (1 mg total) by mouth daily  Dispense: 90 tablet; Refill: 1    2. Tobacco abuse  CT lung screening is UTD   No concerns   Repeat in 1 year       HPI:  Oncology History Overview Note   H/o Stage IB ER/CT+, HER2- right breast cancer.  On 23 she completed RT to right breast utilizing a hyperfractionated regimen with a boost to lumpectomy site.  Her treatment was initially delayed secondary to wound healing.  She also had a 1 week break during her treatment d/t knee injury.     Today's visit is an EOT phone follow-up.       23  [facility]  Is tolerating aromatase inhibitor.  No concerns on exam     23  [facility]  Maintained on anastrozole.  No concerns on exam.  F/U in 6 months     Upcomin24  DEXA scan  24  Medical Oncology  24  Surgical Oncology             Malignant neoplasm of lower-inner quadrant of right breast of female, estrogen receptor positive (HCC)   2023 Biopsy     Right breast biopsy:  - Invasive ductal carcinoma  Grade 3  ER 90%  IA 15%  HER2 negative     2/22/2023 -  Cancer Staged    Staging form: Breast, AJCC 8th Edition  - Clinical stage from 2/22/2023: Stage IIA (cT2, cN0, cM0, G3, ER+, IA+, HER2-) - Signed by Sarah Pena MD on 2/22/2023  Stage prefix: Initial diagnosis  Method of lymph node assessment: Clinical  Histologic grading system: 3 grade system       3/28/2023 Surgery    Right breast lumpectomy with sentinel lymph node biopsy:  - clear margins  - 0/3 lymph nodes    Dr. Pena     4/17/2023 -  Cancer Staged    Staging form: Breast, AJCC 8th Edition  - Pathologic stage from 4/17/2023: Stage IB (pT2, pN0(sn), cM0, G3, ER+, IA+, HER2-) - Signed by Sarah Pena MD on 4/17/2023  Stage prefix: Initial diagnosis  Method of lymph node assessment: [place] lymph node biopsy  Histologic grading system: 3 grade system       5/1/2023 - 5/2/2023 Chemotherapy    Pegfilgrastim-bmez (ZIEXTENZO), 6 mg, Subcutaneous, Once, 1 of 4 cycles  Administration: 6 mg (5/2/2023)  cyclophosphamide (CYTOXAN) IVPB, 600 mg/m2 = 1,170 mg, Intravenous, Once, 1 of 4 cycles  Administration: 1,170 mg (5/1/2023)  DOCEtaxel (TAXOTERE) chemo infusion, 75 mg/m2 = 146.2 mg, Intravenous, Once, 1 of 4 cycles  Administration: 146 mg (5/1/2023)     5/2023 -  Hormone Therapy    Anastrozole  Dr. Tamez     10/12/2023 - 11/27/2023 Radiation    Right whole breast  Dr. Bauman     10/23/2023 - 11/27/2023 Radiation      Plan ID Energy Fractions Dose per Fraction (cGy) Dose Correction (cGy) Total Dose Delivered (cGy) Elapsed Days   R Boost 10X/6X 4 / 4 250 0 1,000 7   R Breast 10X/6X 15 / 15 267 0 4,005 25      Treatment dates:  C1: 10/23/2023 - 11/27/2023             ROS: Review of Systems   Constitutional:  Negative for activity change, appetite change, chills, fatigue, fever and unexpected weight change.   HENT:  Negative for nosebleeds.    Respiratory:  Negative for cough and shortness of breath.     Cardiovascular:  Negative for chest pain, palpitations and leg swelling.   Gastrointestinal:  Negative for abdominal pain, constipation, diarrhea, nausea and vomiting.   Genitourinary:  Negative for difficulty urinating, dysuria and hematuria.   Musculoskeletal:  Negative for arthralgias.   Skin: Negative.    Neurological:  Negative for dizziness, weakness, light-headedness, numbness and headaches.   Hematological: Negative.    Psychiatric/Behavioral: Negative.       Past Medical History:   Diagnosis Date    Anxiety     Arthritis     right foot/ back./ hands    Breast cancer (HCC) 03/28/2023    RIGHT    Cellulitis 09/13/2019    COVID     x 2--3/7/22 and approx Sept 2022. recovered @ home    Heartburn     History of chemotherapy     1 dose only    History of radiation therapy     Hypertension     Hypokalemia 04/01/2020    Pneumonia of both lungs due to infectious organism 08/19/2019    pt denies    Tobacco abuse     Wears dentures     full uppers    Wears glasses        Past Surgical History:   Procedure Laterality Date    APPENDECTOMY      APPENDISITIS SURGERY AGE 19    BREAST BIOPSY Right 01/31/2023    BREAST LUMPECTOMY Right 3/28/2023    Procedure: LUMPECTOMY BREAST MARY  LOCALIZED;  Surgeon: Sarah Pena MD;  [facility];  Service: Surgical Oncology    LYMPH NODE BIOPSY Right 3/28/2023    Procedure: BIOPSY LYMPH NODE, SENTINEL LYMPHOSCINTIGRAPHY, LYMPHATIC MAPPING;  Surgeon: Sarah Pena MD;  [facility];  Service: Surgical Oncology    US GUIDED BREAST BIOPSY RIGHT COMPLETE Right 01/31/2023       Social History     Socioeconomic History    Marital status:      Spouse name: None    Number of children: None    Years of education: None    Highest education level: None   Occupational History    None   Tobacco Use    Smoking status: Every Day     Current packs/day: 0.03     Average packs/day: 0.5 packs/day for 48.4 years (24.7 ttl pk-yrs)     Types: Cigarettes     Start date: 1976      Passive exposure: Never    Smokeless tobacco: Never    Tobacco comments:     States she is smoking 4-5 cig/day   Vaping Use    Vaping status: Never Used   Substance and Sexual Activity    Alcohol use: Not Currently     Alcohol/week: 7.0 standard drinks of alcohol     Comment: quit 2 years ago Oct 2020    Drug use: Not Currently    Sexual activity: Not Currently   Other Topics Concern    None   Social History Narrative    None     Social Determinants of Health     Financial Resource Strain: Not on file   Food Insecurity: Not on file   Transportation Needs: Not on file   Physical Activity: Not on file   Stress: Not on file   Social Connections: Not on file   Intimate Partner Violence: Not on file   Housing Stability: Not on file       Family History   Problem Relation Age of Onset    COPD Mother     No Known Problems Father     No Known Problems Brother     No Known Problems Brother     No Known Problems Brother     No Known Problems Daughter     No Known Problems Maternal Aunt     Lung cancer Maternal Uncle         age at dx unk    Ovarian cancer Maternal Grandmother         age at dx unk    Prostate cancer Maternal Grandfather         age at dx unk    No Known Problems Paternal Grandmother     No Known Problems Paternal Grandfather     Breast cancer Neg Hx     Colon cancer Neg Hx        Allergies   Allergen Reactions    Amlodipine Edema    Lisinopril Cough    Pollen Extract          Current Outpatient Medications:     acetaminophen (TYLENOL) 650 mg CR tablet, Take 1 tablet (650 mg total) by mouth every 8 (eight) hours as needed for mild pain, Disp: 30 tablet, Rfl: 3    albuterol (Ventolin HFA) 90 mcg/act inhaler, Inhale 2 puffs every 6 (six) hours as needed for wheezing, Disp: 18 g, Rfl: 1    anastrozole (ARIMIDEX) 1 mg tablet, Take 1 tablet (1 mg total) by mouth daily, Disp: 90 tablet, Rfl: 1    atorvastatin (LIPITOR) 10 mg tablet, Take 1 tablet (10 mg total) by mouth daily, Disp: 90 tablet, Rfl: 1    buPROPion  "(Wellbutrin XL) 150 mg 24 hr tablet, Take 1 tablet (150 mg total) by mouth every morning, Disp: 90 tablet, Rfl: 1    ibuprofen (MOTRIN) 200 mg tablet, Take 200-800 mg by mouth every 6 (six) hours as needed for mild pain, Disp: , Rfl:     irbesartan (AVAPRO) 300 mg tablet, Take 1 tablet (300 mg total) by mouth daily at bedtime, Disp: 90 tablet, Rfl: 1    ketotifen (ZADITOR) 0.025 % ophthalmic solution, Administer 1 drop into the left eye 2 (two) times a day as needed (pink eye), Disp: 5 mL, Rfl: 0    sertraline (ZOLOFT) 50 mg tablet, Take 1 tablet (50 mg total) by mouth daily, Disp: 90 tablet, Rfl: 1      Physical Exam:  /70 (BP Location: Left arm, Patient Position: Sitting, Cuff Size: Adult)   Pulse 85   Temp 97.5 °F (36.4 °C) (Temporal)   Resp 16   Ht 5' 3\" (1.6 m)   Wt 96.2 kg (212 lb)   SpO2 97%   BMI 37.55 kg/m²     Physical Exam  Vitals reviewed.   Constitutional:       General: She is not in acute distress.     Appearance: She is well-developed. She is not ill-appearing.   HENT:      Head: Normocephalic and atraumatic.   Eyes:      General: No scleral icterus.     Conjunctiva/sclera: Conjunctivae normal.   Cardiovascular:      Rate and Rhythm: Normal rate and regular rhythm.      Heart sounds: Normal heart sounds. No murmur heard.  Pulmonary:      Effort: Pulmonary effort is normal. No respiratory distress.      Breath sounds: Normal breath sounds.   Abdominal:      Palpations: Abdomen is soft.      Tenderness: There is no abdominal tenderness.   Musculoskeletal:         General: No tenderness. Normal range of motion.      Cervical back: Normal range of motion and neck supple.      Right lower leg: No edema.      Left lower leg: No edema.   Lymphadenopathy:      Cervical: No cervical adenopathy.   Skin:     General: Skin is warm and dry.   Neurological:      Mental Status: She is alert and oriented to person, place, and time.      Cranial Nerves: No cranial nerve deficit.   Psychiatric:         " Mood and Affect: Mood normal.         Behavior: Behavior normal.       Labs:  Lab Results   Component Value Date    WBC 6.08 04/27/2023    HGB 12.2 04/27/2023    HCT 38.2 04/27/2023    MCV 91 04/27/2023     04/27/2023     I have spent 30 minutes with Patient  today in which greater than 50% of this time was spent in counseling/coordination of care regarding Diagnostic results, Risks and benefits of tx options, Instructions for management, Patient and family education, Documenting in the medical record, Reviewing / ordering tests, medicine, procedures  , and Obtaining or reviewing history  .    Patient voiced understanding and agreement in the above discussion. Aware to contact our office with questions/symptoms in the interim.     This note has been generated by voice recognition software system.  Therefore, there may be spelling, grammar, and or syntax errors. Please contact if questions arise.

## 2024-07-05 ENCOUNTER — PATIENT OUTREACH (OUTPATIENT)
Dept: HEMATOLOGY ONCOLOGY | Facility: CLINIC | Age: 64
End: 2024-07-05

## 2024-07-05 NOTE — PROGRESS NOTES
Pt called and stated that she needed transportation set up for the following dates and times:    7/11/24 @ 9:30 [facility] [place], [address], [address] [place]      7/22/24 @ 2:30 [facility], [address], [address], [place], [place],     1/22/25 @ 9:15 [facility] [place], [address], [address] [place], [place]    6/17/25 @ 10:00 [facility] [place], [address], [place], [place]    I did send the dates and times to [facility] and they were confirmed.

## 2024-07-08 ENCOUNTER — PATIENT OUTREACH (OUTPATIENT)
Dept: HEMATOLOGY ONCOLOGY | Facility: CLINIC | Age: 64
End: 2024-07-08

## 2024-07-08 NOTE — PROGRESS NOTES
Pt called and stated that she received a voice message reminding her of an appointment she currently does not have scheduled.    I did outreach [facility] to verify the dates and times of the Pt's appointments that I sent over for confirmation on 7/5/24    There was a typo in the appointment date given but I did confirm with Conner that Pt has appointments scheduled for 7/11/24 @ 9:30 and 7/22/24@ 2:30    Pt is aware of the error made and I did confirm with her that transportation has been confirmed.

## 2024-07-11 ENCOUNTER — OFFICE VISIT (OUTPATIENT)
Dept: SURGICAL ONCOLOGY | Facility: CLINIC | Age: 64
End: 2024-07-11
Payer: COMMERCIAL

## 2024-07-11 ENCOUNTER — PATIENT OUTREACH (OUTPATIENT)
Dept: HEMATOLOGY ONCOLOGY | Facility: CLINIC | Age: 64
End: 2024-07-11

## 2024-07-11 VITALS
WEIGHT: 214.8 LBS | DIASTOLIC BLOOD PRESSURE: 80 MMHG | SYSTOLIC BLOOD PRESSURE: 126 MMHG | BODY MASS INDEX: 38.06 KG/M2 | TEMPERATURE: 97.9 F | RESPIRATION RATE: 16 BRPM | HEART RATE: 69 BPM | HEIGHT: 63 IN | OXYGEN SATURATION: 95 %

## 2024-07-11 DIAGNOSIS — Z79.811 USE OF ANASTROZOLE: ICD-10-CM

## 2024-07-11 DIAGNOSIS — C50.311 MALIGNANT NEOPLASM OF LOWER-INNER QUADRANT OF RIGHT BREAST OF FEMALE, ESTROGEN RECEPTOR POSITIVE (HCC): Primary | ICD-10-CM

## 2024-07-11 DIAGNOSIS — Z17.0 MALIGNANT NEOPLASM OF LOWER-INNER QUADRANT OF RIGHT BREAST OF FEMALE, ESTROGEN RECEPTOR POSITIVE (HCC): Primary | ICD-10-CM

## 2024-07-11 PROCEDURE — 99213 OFFICE O/P EST LOW 20 MIN: CPT | Performed by: NURSE PRACTITIONER

## 2024-07-11 NOTE — PROGRESS NOTES
Surgical Oncology Follow Up       [address]  [facility] SURGICAL ONCOLOGY [place]  [address]  [facility] [id number]    Corina Sena  1960  [id number]  [address]  [facility]  [address] [place] [address]    Chief Complaint   Patient presents with    Follow-up       Assessment/Plan:  1. Malignant neoplasm of lower-inner quadrant of right breast of female, estrogen receptor positive (HCC)  - 6 mo f/u visit    2. Use of anastrozole  - Continue use per medical oncology      Discussion/Summary: Patient is a 64-year-old female that was diagnosed with a right-sided breast cancer in January 2023.  Her pathology revealed invasive ductal carcinoma, ER 90%, IL 15%, HER2 negative.  She underwent a right lumpectomy and sentinel node biopsy with Dr. Pena.  She received 1 dose of chemotherapy and discontinued secondary to poor tolerance.  She developed wound healing problems requiring care at the wound center.  She completed adjuvant radiation therapy and is currently maintained on anastrozole.  She had a bilateral 3D diagnostic mammogram in March 2024 which was BI-RADS 2, category 3 density.  No worrisome findings on today's clinical exam.  Patient states that she has been finding it difficult to find a well-fitted bra, offered referral to mastectomy boutique but she prefers to hold off for now.  She also notices some texture changes of the right breast.  Clinical exam reveals no worrisome findings.  There is mild skin thickening in the inferior aspect of the breast secondary to radiation changes.  I instructed her to massage the area.  We will plan to see her back in 6 months for a follow-up visit or sooner should the need arise.  She was instructed to contact us with any changes or concerns in the interim.  All of her questions were answered today.    History of Present Illness:     Oncology History Overview Note   H/o Stage IB  ER/ID+, HER2- right breast cancer.  On 23 she completed RT to right breast utilizing a hyperfractionated regimen with a boost to lumpectomy site.  Her treatment was initially delayed secondary to wound healing.  She also had a 1 week break during her treatment d/t knee injury.     Today's visit is an EOT phone follow-up.       23  [facility]  Is tolerating aromatase inhibitor.  No concerns on exam     23  [facility]  Maintained on anastrozole.  No concerns on exam.  F/U in 6 months     Upcomin24  DEXA scan  24  Medical Oncology  24  Surgical Oncology             Malignant neoplasm of lower-inner quadrant of right breast of female, estrogen receptor positive (HCC)   2023 Biopsy    Right breast biopsy:  - Invasive ductal carcinoma  Grade 3  ER 90%  ID 15%  HER2 negative     2023 -  Cancer Staged    Staging form: Breast, AJCC 8th Edition  - Clinical stage from 2023: Stage IIA (cT2, cN0, cM0, G3, ER+, ID+, HER2-) - Signed by Sarah Pena MD on 2023  Stage prefix: Initial diagnosis  Method of lymph node assessment: Clinical  Histologic grading system: 3 grade system       3/28/2023 Surgery    Right breast lumpectomy with sentinel lymph node biopsy:  - clear margins  - 0/3 lymph nodes    Dr. Pena     2023 -  Cancer Staged    Staging form: Breast, AJCC 8th Edition  - Pathologic stage from 2023: Stage IB (pT2, pN0(sn), cM0, G3, ER+, ID+, HER2-) - Signed by Sarah Pena MD on 2023  Stage prefix: Initial diagnosis  Method of lymph node assessment: [place] lymph node biopsy  Histologic grading system: 3 grade system       2023 - 2023 Chemotherapy    Pegfilgrastim-bmez (ZIEXTENZO), 6 mg, Subcutaneous, Once, 1 of 4 cycles  Administration: 6 mg (2023)  cyclophosphamide (CYTOXAN) IVPB, 600 mg/m2 = 1,170 mg, Intravenous, Once, 1 of 4 cycles  Administration: 1,170 mg (2023)  DOCEtaxel (TAXOTERE) chemo infusion, 75  mg/m2 = 146.2 mg, Intravenous, Once, 1 of 4 cycles  Administration: 146 mg (5/1/2023)     5/2023 -  Hormone Therapy    Anastrozole  Dr. Tamez     10/12/2023 - 11/27/2023 Radiation    Right whole breast  Dr. Bauman     10/23/2023 - 11/27/2023 Radiation      Plan ID Energy Fractions Dose per Fraction (cGy) Dose Correction (cGy) Total Dose Delivered (cGy) Elapsed Days   R Boost 10X/6X 4 / 4 250 0 1,000 7   R Breast 10X/6X 15 / 15 267 0 4,005 25      Treatment dates:  C1: 10/23/2023 - 11/27/2023              -Interval History: Patient presents today for follow-up visit.  She has not appreciated any new breast lumps but notices some texture changes of the right breast.  She had a bilateral mammogram in March which was benign.  Denies persistent headaches, back pain or bone pain, cough or shortness of breath, abdominal pain.    Review of Systems:  Review of Systems   Constitutional:  Negative for activity change, appetite change, chills, fatigue, fever and unexpected weight change.   Respiratory:  Negative for cough and shortness of breath.    Cardiovascular:  Negative for chest pain.   Gastrointestinal:  Negative for abdominal pain, constipation, diarrhea, nausea and vomiting.   Musculoskeletal:  Negative for arthralgias, back pain, gait problem and myalgias.   Skin:  Negative for color change and rash.   Neurological:  Negative for dizziness and headaches.   Hematological:  Negative for adenopathy.   Psychiatric/Behavioral:  Negative for agitation and confusion.    All other systems reviewed and are negative.      Patient Active Problem List   Diagnosis    Essential hypertension    Chronic right shoulder pain    Light cigarette smoker (1-9 cigarettes per day)    Seasonal allergic rhinitis due to pollen    Mixed hyperlipidemia    Major depressive disorder    Anxiety    Tobacco abuse    Influenza vaccine refused    Obesity (BMI 35.0-39.9 without comorbidity)    Malignant neoplasm of lower-inner quadrant of right breast of  female, estrogen receptor positive (HCC)    CKD (chronic kidney disease), stage II    Use of anastrozole     Past Medical History:   Diagnosis Date    Anxiety     Arthritis     right foot/ back./ hands    Breast cancer (HCC) 03/28/2023    RIGHT    Cellulitis 09/13/2019    COVID     x 2--3/7/22 and approx Sept 2022. recovered @ home    Heartburn     History of chemotherapy     1 dose only    History of radiation therapy     Hypertension     Hypokalemia 04/01/2020    Pneumonia of both lungs due to infectious organism 08/19/2019    pt denies    Tobacco abuse     Wears dentures     full uppers    Wears glasses      Past Surgical History:   Procedure Laterality Date    APPENDECTOMY      APPENDISITIS SURGERY AGE 19    BREAST BIOPSY Right 01/31/2023    BREAST LUMPECTOMY Right 3/28/2023    Procedure: LUMPECTOMY BREAST MARY  LOCALIZED;  Surgeon: Sarah Pena MD;  [facility];  Service: Surgical Oncology    LYMPH NODE BIOPSY Right 3/28/2023    Procedure: BIOPSY LYMPH NODE, SENTINEL LYMPHOSCINTIGRAPHY, LYMPHATIC MAPPING;  Surgeon: Sarah Pena MD;  [facility];  Service: Surgical Oncology    US GUIDED BREAST BIOPSY RIGHT COMPLETE Right 01/31/2023     Family History   Problem Relation Age of Onset    COPD Mother     No Known Problems Father     No Known Problems Brother     No Known Problems Brother     No Known Problems Brother     No Known Problems Daughter     No Known Problems Maternal Aunt     Lung cancer Maternal Uncle         age at dx unk    Ovarian cancer Maternal Grandmother         age at dx unk    Prostate cancer Maternal Grandfather         age at dx unk    No Known Problems Paternal Grandmother     No Known Problems Paternal Grandfather     Breast cancer Neg Hx     Colon cancer Neg Hx      Social History     Socioeconomic History    Marital status:      Spouse name: Not on file    Number of children: Not on file    Years of education: Not on file    Highest education level: Not on file    Occupational History    Not on file   Tobacco Use    Smoking status: Every Day     Current packs/day: 0.03     Average packs/day: 0.5 packs/day for 48.5 years (24.7 ttl pk-yrs)     Types: Cigarettes     Start date: 1976     Passive exposure: Never    Smokeless tobacco: Never    Tobacco comments:     States she is smoking 4-5 cig/day   Vaping Use    Vaping status: Never Used   Substance and Sexual Activity    Alcohol use: Not Currently     Alcohol/week: 7.0 standard drinks of alcohol     Comment: quit 2 years ago Oct 2020    Drug use: Not Currently    Sexual activity: Not Currently   Other Topics Concern    Not on file   Social History Narrative    Not on file     Social Determinants of Health     Financial Resource Strain: Not on file   Food Insecurity: Not on file   Transportation Needs: Not on file   Physical Activity: Not on file   Stress: Not on file   Social Connections: Unknown (6/18/2024)    Received from [employer]     How often do you feel lonely or isolated from those around you? (Adult - for ages 18 years and over): Not on file   Intimate Partner Violence: Not on file   Housing Stability: Not on file       Current Outpatient Medications:     acetaminophen (TYLENOL) 650 mg CR tablet, Take 1 tablet (650 mg total) by mouth every 8 (eight) hours as needed for mild pain, Disp: 30 tablet, Rfl: 3    albuterol (Ventolin HFA) 90 mcg/act inhaler, Inhale 2 puffs every 6 (six) hours as needed for wheezing, Disp: 18 g, Rfl: 1    anastrozole (ARIMIDEX) 1 mg tablet, Take 1 tablet (1 mg total) by mouth daily, Disp: 90 tablet, Rfl: 1    atorvastatin (LIPITOR) 10 mg tablet, Take 1 tablet (10 mg total) by mouth daily, Disp: 90 tablet, Rfl: 1    buPROPion (Wellbutrin XL) 150 mg 24 hr tablet, Take 1 tablet (150 mg total) by mouth every morning, Disp: 90 tablet, Rfl: 1    ibuprofen (MOTRIN) 200 mg tablet, Take 200-800 mg by mouth every 6 (six) hours as needed for mild pain, Disp: , Rfl:     irbesartan  (AVAPRO) 300 mg tablet, Take 1 tablet (300 mg total) by mouth daily at bedtime, Disp: 90 tablet, Rfl: 1    ketotifen (ZADITOR) 0.025 % ophthalmic solution, Administer 1 drop into the left eye 2 (two) times a day as needed (pink eye), Disp: 5 mL, Rfl: 0    sertraline (ZOLOFT) 50 mg tablet, Take 1 tablet (50 mg total) by mouth daily, Disp: 90 tablet, Rfl: 1  Allergies   Allergen Reactions    Amlodipine Edema    Lisinopril Cough    Pollen Extract      Vitals:    07/11/24 0946   BP: 126/80   Pulse: 69   Resp: 16   Temp: 97.9 °F (36.6 °C)   SpO2: 95%       Physical Exam  Vitals reviewed.   Constitutional:       General: She is not in acute distress.     Appearance: Normal appearance. She is well-developed. She is not diaphoretic.   HENT:      Head: Normocephalic and atraumatic.   Cardiovascular:      Rate and Rhythm: Normal rate and regular rhythm.      Heart sounds: Normal heart sounds.   Pulmonary:      Effort: Pulmonary effort is normal.      Breath sounds: Normal breath sounds.   Chest:   Breasts:     Breasts are asymmetrical.      Right: Skin change (surgical scars) present. No swelling, bleeding, inverted nipple, mass, nipple discharge or tenderness.      Left: No swelling, bleeding, inverted nipple, mass, nipple discharge, skin change or tenderness.      Comments: Mild skin thickening inferior right breast secondary to RT- recommended massage  Abdominal:      Palpations: Abdomen is soft. There is no mass.      Tenderness: There is no abdominal tenderness.   Musculoskeletal:         General: Normal range of motion.      Cervical back: Normal range of motion.   Lymphadenopathy:      Upper Body:      Right upper body: No supraclavicular or axillary adenopathy.      Left upper body: No supraclavicular or axillary adenopathy.   Skin:     General: Skin is warm and dry.      Findings: No rash.   Neurological:      Mental Status: She is alert and oriented to person, place, and time.   Psychiatric:         Speech: Speech  normal.         Advance Care Planning/Advance Directives:  Discussed disease status, cancer treatment plans and/or cancer treatment goals with the patient.

## 2024-07-11 NOTE — PROGRESS NOTES
Pt called and stated that [facility] went to the wrong address for her appointment today.  I did outreach [facility] and they did state that they could send another  over in 11 mins, I did outreach Ramon Kang to verify that it will be ok if the Pt arrives at 9:45 instead of 9:30?  Ramon Kang did confirm that she will still be able to see the Pt when she arrives and the Pt is aware that another  will be picking her up for her appointment.

## 2024-07-22 ENCOUNTER — TELEPHONE (OUTPATIENT)
Dept: HEMATOLOGY ONCOLOGY | Facility: CLINIC | Age: 64
End: 2024-07-22

## 2024-07-22 ENCOUNTER — OFFICE VISIT (OUTPATIENT)
Dept: RADIATION ONCOLOGY | Facility: CLINIC | Age: 64
End: 2024-07-22
Attending: INTERNAL MEDICINE
Payer: COMMERCIAL

## 2024-07-22 VITALS
TEMPERATURE: 97.5 F | WEIGHT: 214 LBS | DIASTOLIC BLOOD PRESSURE: 90 MMHG | BODY MASS INDEX: 37.91 KG/M2 | OXYGEN SATURATION: 97 % | SYSTOLIC BLOOD PRESSURE: 172 MMHG | HEART RATE: 80 BPM

## 2024-07-22 DIAGNOSIS — C50.311 MALIGNANT NEOPLASM OF LOWER-INNER QUADRANT OF RIGHT BREAST OF FEMALE, ESTROGEN RECEPTOR POSITIVE (HCC): Primary | ICD-10-CM

## 2024-07-22 DIAGNOSIS — Z17.0 MALIGNANT NEOPLASM OF LOWER-INNER QUADRANT OF RIGHT BREAST OF FEMALE, ESTROGEN RECEPTOR POSITIVE (HCC): Primary | ICD-10-CM

## 2024-07-22 PROCEDURE — 99213 OFFICE O/P EST LOW 20 MIN: CPT | Performed by: INTERNAL MEDICINE

## 2024-07-22 NOTE — TELEPHONE ENCOUNTER
Patient called to confirm she had transportation for her follow up with Dr. Drummond today at 2:30pm. I called [facility] and they stated they did not have her scheduled. I spoke with Conner, and he has set patient up for a [employer] ride with a  time of 1:50pm. Information relayed to patient. She had no further questions or concerns at this time. I encouraged her to call should any arise.

## 2024-07-22 NOTE — PROGRESS NOTES
Follow-up - Radiation Oncology   Corina Sena 1960 64 y.o. female [id number]    Cancer Staging   Malignant neoplasm of lower-inner quadrant of right breast of female, estrogen receptor positive (HCC)  Staging form: Breast, AJCC 8th Edition  - Clinical stage from 2/22/2023: Stage IIA (cT2, cN0, cM0, G3, ER+, MA+, HER2-) - Signed by Sarah Pena MD on 2/22/2023  Stage prefix: Initial diagnosis  Method of lymph node assessment: Clinical  Histologic grading system: 3 grade system  - Pathologic stage from 4/17/2023: Stage IB (pT2, pN0(sn), cM0, G3, ER+, MA+, HER2-) - Signed by Sarah Pena MD on 4/17/2023  Stage prefix: Initial diagnosis  Method of lymph node assessment: [place] lymph node biopsy  Histologic grading system: 3 grade system    Assessment/Plan:  Corina Sena is a 64 y.o. female former patient of Dr. Bauman, who underwent radiation therapy for right breast cancer completing on 11/27/2023.    She presents for routine follow-up visit now 8 months after completion of treatment.  She has no significant late toxicity related to radiation therapy.  Her interval mammogram was unremarkable.  She remains on endocrine therapy.  Her breast exam shows no significant findings.  Reviewed continued mammographic surveillance.  1/22/25   Dr. Pena  6/17/25   Medical Oncology  RTC in 1 year.    Carlito Drummond MD  Department of Radiation Oncology  [facility]    Total Time Spent  20 minutes spent reviewing EMR in preparation for visit, with the patient, coordination with other providers, and documentation.    No orders of the defined types were placed in this encounter.       History of Present Illness   Interval History:  Corina Sena 1960 is a 64 y.o. female H/o Stage IB ER/MA+, HER2- right breast cancer.  On 11/27/23 she completed RT to right breast utilizing a hyperfractionated regimen with a boost to lumpectomy site.  Her treatment was initially delayed secondary to wound  healing.  She also had a 1 week break during her treatment d/t knee injury.  She was last seen in radiation oncology with Dr. Bauman on 2024.        3/27/24  Diagnostic Mammogram  IMPRESSION:   No findings of malignancy in either breast.  Interval expected postoperative changes are present in the right breast.  ASSESSMENT/BI-RADS CATEGORY:  Overall: 2 - Benign   RECOMMENDATION:       - Diagnostic mammogram in 1 year for both breasts.     2024  [facility]  Tolerating aromatase inhibitor.  Imaging up to date.  F/U in 1 year     24  [facility]  No worrisome findings on exam.  Declined referral to mastectomy santi.  Remains on anastrozole.  F/U in 6 months     Upcomin25   Dr. Pena  25   [facility]     Feels well has no specific breast complaints related to radiation therapy.    Historical Information   Oncology History   Malignant neoplasm of lower-inner quadrant of right breast of female, estrogen receptor positive (HCC)   2023 Biopsy    Right breast biopsy:  - Invasive ductal carcinoma  Grade 3  ER 90%  VA 15%  HER2 negative     2023 -  Cancer Staged    Staging form: Breast, AJCC 8th Edition  - Clinical stage from 2023: Stage IIA (cT2, cN0, cM0, G3, ER+, VA+, HER2-) - Signed by Sarah Pena MD on 2023  Stage prefix: Initial diagnosis  Method of lymph node assessment: Clinical  Histologic grading system: 3 grade system       3/28/2023 Surgery    Right breast lumpectomy with sentinel lymph node biopsy:  - clear margins  - 0/3 lymph nodes    Dr. Pena     2023 -  Cancer Staged    Staging form: Breast, AJCC 8th Edition  - Pathologic stage from 2023: Stage IB (pT2, pN0(sn), cM0, G3, ER+, VA+, HER2-) - Signed by Sarah Pena MD on 2023  Stage prefix: Initial diagnosis  Method of lymph node assessment: [place] lymph node biopsy  Histologic grading system: 3 grade system       2023 - 2023 Chemotherapy     Pegfilgrastim-bmez (ZIEXTENZO), 6 mg, Subcutaneous, Once, 1 of 4 cycles  Administration: 6 mg (5/2/2023)  cyclophosphamide (CYTOXAN) IVPB, 600 mg/m2 = 1,170 mg, Intravenous, Once, 1 of 4 cycles  Administration: 1,170 mg (5/1/2023)  DOCEtaxel (TAXOTERE) chemo infusion, 75 mg/m2 = 146.2 mg, Intravenous, Once, 1 of 4 cycles  Administration: 146 mg (5/1/2023)     5/2023 -  Hormone Therapy    Anastrozole  Dr. Tamez     10/12/2023 - 11/27/2023 Radiation    Right whole breast  Dr. Bauman     10/23/2023 - 11/27/2023 Radiation      Plan ID Energy Fractions Dose per Fraction (cGy) Dose Correction (cGy) Total Dose Delivered (cGy) Elapsed Days   R Boost 10X/6X 4 / 4 250 0 1,000 7   R Breast 10X/6X 15 / 15 267 0 4,005 25      Treatment dates:  C1: 10/23/2023 - 11/27/2023           Past Medical History:   Diagnosis Date    Anxiety     Arthritis     right foot/ back./ hands    Breast cancer (HCC) 03/28/2023    RIGHT    Cellulitis 09/13/2019    COVID     x 2--3/7/22 and approx Sept 2022. recovered @ home    Heartburn     History of chemotherapy     1 dose only    History of radiation therapy     Hypertension     Hypokalemia 04/01/2020    Pneumonia of both lungs due to infectious organism 08/19/2019    pt denies    Tobacco abuse     Wears dentures     full uppers    Wears glasses      Past Surgical History:   Procedure Laterality Date    APPENDECTOMY      APPENDISITIS SURGERY AGE 19    BREAST BIOPSY Right 01/31/2023    BREAST LUMPECTOMY Right 3/28/2023    Procedure: LUMPECTOMY BREAST MARY  LOCALIZED;  Surgeon: Sarah Pena MD;  [facility];  Service: Surgical Oncology    LYMPH NODE BIOPSY Right 3/28/2023    Procedure: BIOPSY LYMPH NODE, SENTINEL LYMPHOSCINTIGRAPHY, LYMPHATIC MAPPING;  Surgeon: Sarah Pena MD;  [facility];  Service: Surgical Oncology    US GUIDED BREAST BIOPSY RIGHT COMPLETE Right 01/31/2023     Social History   Social History     Substance and Sexual Activity   Alcohol Use Not Currently     Alcohol/week: 7.0 standard drinks of alcohol    Comment: quit 2 years ago Oct 2020     Social History     Substance and Sexual Activity   Drug Use Never     Social History     Tobacco Use   Smoking Status Every Day    Current packs/day: 0.03    Average packs/day: 0.5 packs/day for 48.6 years (24.7 ttl pk-yrs)    Types: Cigarettes    Start date: 1976    Passive exposure: Never   Smokeless Tobacco Never   Tobacco Comments    States she is smoking 4-5 cig/day       Meds/Allergies     Current Outpatient Medications:     acetaminophen (TYLENOL) 650 mg CR tablet, Take 1 tablet (650 mg total) by mouth every 8 (eight) hours as needed for mild pain, Disp: 30 tablet, Rfl: 3    anastrozole (ARIMIDEX) 1 mg tablet, Take 1 tablet (1 mg total) by mouth daily, Disp: 90 tablet, Rfl: 1    atorvastatin (LIPITOR) 10 mg tablet, Take 1 tablet (10 mg total) by mouth daily, Disp: 90 tablet, Rfl: 1    buPROPion (Wellbutrin XL) 150 mg 24 hr tablet, Take 1 tablet (150 mg total) by mouth every morning, Disp: 90 tablet, Rfl: 1    ibuprofen (MOTRIN) 200 mg tablet, Take 200-800 mg by mouth every 6 (six) hours as needed for mild pain, Disp: , Rfl:     irbesartan (AVAPRO) 300 mg tablet, Take 1 tablet (300 mg total) by mouth daily at bedtime, Disp: 90 tablet, Rfl: 1    sertraline (ZOLOFT) 50 mg tablet, Take 1 tablet (50 mg total) by mouth daily, Disp: 90 tablet, Rfl: 1    albuterol (Ventolin HFA) 90 mcg/act inhaler, Inhale 2 puffs every 6 (six) hours as needed for wheezing (Patient not taking: Reported on 7/22/2024), Disp: 18 g, Rfl: 1    ketotifen (ZADITOR) 0.025 % ophthalmic solution, Administer 1 drop into the left eye 2 (two) times a day as needed (pink eye) (Patient not taking: Reported on 7/22/2024), Disp: 5 mL, Rfl: 0  Allergies   Allergen Reactions    Amlodipine Edema    Lisinopril Cough    Pollen Extract        Review of Systems:  Refer to nursing note    OBJECTIVE:   BP (!) 172/90   Pulse 80   Temp 97.5 °F (36.4 °C)   Wt 97.1 kg (214  "lb)   SpO2 97%   BMI 37.91 kg/m²     Physical Exam:   General Appearance:  Alert, cooperative, no distress, appears stated age  Lungs: Respirations unlabored, no cyanosis, able to speak in complete sentences without dyspnea.  Breast Exam:  Right Breast: No residual dyspigmentation in the irradiated field. Some dependent edema (mild). Some scar tissue. No suspicious findings in breast or regional shashi areas.  Left  Breast: Benign exam. No palpable abnormalities in the breast, axilla, or supraclavicular region  Exam chaperoned by female nursing staff.  Extremities: No cyanosis or edema  Skin: No generalized rash or dermatitis  Neurologic: ANOx3, speech and cognition intact.    Portions of the record may have been created with voice recognition software.  Occasional wrong word or \"sound a like\" substitutions may have occurred due to the inherent limitations of voice recognition software.  Read the chart carefully and recognize, using context, where substitutions have occurred.  "

## 2024-07-22 NOTE — PROGRESS NOTES
Corina Sena 1960 is a 64 y.o. female H/o Stage IB ER/NJ+, HER2- right breast cancer.  On 23 she completed RT to right breast utilizing a hyperfractionated regimen with a boost to lumpectomy site.  Her treatment was initially delayed secondary to wound healing.  She also had a 1 week break during her treatment d/t knee injury.  She was last seen in radiation oncology with Dr. Bauman on 2024.      3/27/24  Diagnostic Mammogram  IMPRESSION:   No findings of malignancy in either breast.  Interval expected postoperative changes are present in the right breast.  ASSESSMENT/BI-RADS CATEGORY:  Overall: 2 - Benign   RECOMMENDATION:       - Diagnostic mammogram in 1 year for both breasts.    2024  [facility]  Tolerating aromatase inhibitor.  Imaging up to date.  F/U in 1 year    24  [facility]  No worrisome findings on exam.  Declined referral to mastectomy santi.  Remains on anastrozole.  F/U in 6 months    Upcomin25   Dr. Pena  25   Medical Oncology    Follow up visit     Oncology History   Malignant neoplasm of lower-inner quadrant of right breast of female, estrogen receptor positive (HCC)   2023 Biopsy    Right breast biopsy:  - Invasive ductal carcinoma  Grade 3  ER 90%  NJ 15%  HER2 negative     2023 -  Cancer Staged    Staging form: Breast, AJCC 8th Edition  - Clinical stage from 2023: Stage IIA (cT2, cN0, cM0, G3, ER+, NJ+, HER2-) - Signed by Sarah Pena MD on 2023  Stage prefix: Initial diagnosis  Method of lymph node assessment: Clinical  Histologic grading system: 3 grade system       3/28/2023 Surgery    Right breast lumpectomy with sentinel lymph node biopsy:  - clear margins  - 0/3 lymph nodes    Dr. Pena     2023 -  Cancer Staged    Staging form: Breast, AJCC 8th Edition  - Pathologic stage from 2023: Stage IB (pT2, pN0(sn), cM0, G3, ER+, NJ+, HER2-) - Signed by Sarah Pena MD on 2023  Stage  prefix: Initial diagnosis  Method of lymph node assessment: Terry lymph node biopsy  Histologic grading system: 3 grade system       5/1/2023 - 5/2/2023 Chemotherapy    Pegfilgrastim-bmez (ZIEXTENZO), 6 mg, Subcutaneous, Once, 1 of 4 cycles  Administration: 6 mg (5/2/2023)  cyclophosphamide (CYTOXAN) IVPB, 600 mg/m2 = 1,170 mg, Intravenous, Once, 1 of 4 cycles  Administration: 1,170 mg (5/1/2023)  DOCEtaxel (TAXOTERE) chemo infusion, 75 mg/m2 = 146.2 mg, Intravenous, Once, 1 of 4 cycles  Administration: 146 mg (5/1/2023)     5/2023 -  Hormone Therapy    Anastrozole  Dr. Tamez     10/12/2023 - 11/27/2023 Radiation    Right whole breast  Dr. Bauman     10/23/2023 - 11/27/2023 Radiation      Plan ID Energy Fractions Dose per Fraction (cGy) Dose Correction (cGy) Total Dose Delivered (cGy) Elapsed Days   R Boost 10X/6X 4 / 4 250 0 1,000 7   R Breast 10X/6X 15 / 15 267 0 4,005 25      Treatment dates:  C1: 10/23/2023 - 11/27/2023             Review of Systems:  Review of Systems   Endocrine: Positive for heat intolerance.   Musculoskeletal:  Positive for arthralgias.   Skin: Negative.         Feels tender in right breast. States that she feels skin is thicker on breast   Allergic/Immunologic: Positive for environmental allergies.       Clinical Trial: no    Pregnancy test needed:  no    Teaching completed    Health Maintenance   Topic Date Due    Pneumococcal Vaccine: Pediatrics (0 to 5 Years) and At-Risk Patients (6 to 64 Years) (1 of 2 - PCV) Never done    HIV Screening  Never done    Annual Physical  Never done    Zoster Vaccine (1 of 2) Never done    DTaP,Tdap,and Td Vaccines (1 - Tdap) Never done    Cervical Cancer Screening  Never done    Colorectal Cancer Screening  Never done    RSV Vaccine Age 60+ Years (1 - 1-dose 60+ series) Never done    COVID-19 Vaccine (2 - Moderna risk series) 09/27/2021    BMI: Followup Plan  03/07/2024    ONC Colorectal Surgery Screening  07/03/2024    Breast Cancer Survivorship Visit   07/03/2024    ONC Cervical Cancer Screening  07/03/2024    Influenza Vaccine (1) 09/01/2024    Lung Cancer Screening  01/11/2025    Depression Screening  02/01/2025    Breast Cancer Screening: Mammogram  03/27/2025    BMI: Adult  07/11/2025    Hepatitis C Screening  Completed    Osteoporosis Screening  Completed    ONC DXA Scan  Completed    RSV Vaccine age 0-20 Months  Aged Out    HIB Vaccine  Aged Out    IPV Vaccine  Aged Out    Hepatitis A Vaccine  Aged Out    Meningococcal ACWY Vaccine  Aged Out    HPV Vaccine  Aged Out    ONC Physical Therapy Referral  Discontinued     Patient Active Problem List   Diagnosis    Essential hypertension    Chronic right shoulder pain    Light cigarette smoker (1-9 cigarettes per day)    Seasonal allergic rhinitis due to pollen    Mixed hyperlipidemia    Major depressive disorder    Anxiety    Tobacco abuse    Influenza vaccine refused    Obesity (BMI 35.0-39.9 without comorbidity)    Malignant neoplasm of lower-inner quadrant of right breast of female, estrogen receptor positive (HCC)    CKD (chronic kidney disease), stage II    Use of anastrozole     Past Medical History:   Diagnosis Date    Anxiety     Arthritis     right foot/ back./ hands    Breast cancer (HCC) 03/28/2023    RIGHT    Cellulitis 09/13/2019    COVID     x 2--3/7/22 and approx Sept 2022. recovered @ home    Heartburn     History of chemotherapy     1 dose only    History of radiation therapy     Hypertension     Hypokalemia 04/01/2020    Pneumonia of both lungs due to infectious organism 08/19/2019    pt denies    Tobacco abuse     Wears dentures     full uppers    Wears glasses      Past Surgical History:   Procedure Laterality Date    APPENDECTOMY      APPENDISITIS SURGERY AGE 19    BREAST BIOPSY Right 01/31/2023    BREAST LUMPECTOMY Right 3/28/2023    Procedure: LUMPECTOMY BREAST MARY  LOCALIZED;  Surgeon: Sarah Pena MD;  Location: [facility];  Service: Surgical Oncology    LYMPH NODE BIOPSY Right 3/28/2023     Procedure: BIOPSY LYMPH NODE, SENTINEL LYMPHOSCINTIGRAPHY, LYMPHATIC MAPPING;  Surgeon: Sarah Pena MD;  Location: [facility];  Service: Surgical Oncology    US GUIDED BREAST BIOPSY RIGHT COMPLETE Right 01/31/2023     Family History   Problem Relation Age of Onset    COPD Mother     No Known Problems Father     No Known Problems Brother     No Known Problems Brother     No Known Problems Brother     No Known Problems Daughter     No Known Problems Maternal Aunt     Lung cancer Maternal Uncle         age at dx unk    Ovarian cancer Maternal Grandmother         age at dx unk    Prostate cancer Maternal Grandfather         age at dx unk    No Known Problems Paternal Grandmother     No Known Problems Paternal Grandfather     Breast cancer Neg Hx     Colon cancer Neg Hx      Social History     Socioeconomic History    Marital status:      Spouse name: Not on file    Number of children: Not on file    Years of education: Not on file    Highest education level: Not on file   Occupational History    Not on file   Tobacco Use    Smoking status: Every Day     Current packs/day: 0.03     Average packs/day: 0.5 packs/day for 48.6 years (24.7 ttl pk-yrs)     Types: Cigarettes     Start date: 1976     Passive exposure: Never    Smokeless tobacco: Never    Tobacco comments:     States she is smoking 4-5 cig/day   Vaping Use    Vaping status: Never Used   Substance and Sexual Activity    Alcohol use: Not Currently     Alcohol/week: 7.0 standard drinks of alcohol     Comment: quit 2 years ago Oct 2020    Drug use: Not Currently    Sexual activity: Not Currently   Other Topics Concern    Not on file   Social History Narrative    Not on file     Social Determinants of Health     Financial Resource Strain: Not on file   Food Insecurity: Not on file   Transportation Needs: Not on file   Physical Activity: Not on file   Stress: Not on file   Social Connections: Unknown (6/18/2024)    Received from [facility]     How often do you feel lonely or isolated from those around you? (Adult - for ages 18 years and over): Not on file   Intimate Partner Violence: Not on file   Housing Stability: Not on file       Current Outpatient Medications:     acetaminophen (TYLENOL) 650 mg CR tablet, Take 1 tablet (650 mg total) by mouth every 8 (eight) hours as needed for mild pain, Disp: 30 tablet, Rfl: 3    albuterol (Ventolin HFA) 90 mcg/act inhaler, Inhale 2 puffs every 6 (six) hours as needed for wheezing, Disp: 18 g, Rfl: 1    anastrozole (ARIMIDEX) 1 mg tablet, Take 1 tablet (1 mg total) by mouth daily, Disp: 90 tablet, Rfl: 1    atorvastatin (LIPITOR) 10 mg tablet, Take 1 tablet (10 mg total) by mouth daily, Disp: 90 tablet, Rfl: 1    buPROPion (Wellbutrin XL) 150 mg 24 hr tablet, Take 1 tablet (150 mg total) by mouth every morning, Disp: 90 tablet, Rfl: 1    ibuprofen (MOTRIN) 200 mg tablet, Take 200-800 mg by mouth every 6 (six) hours as needed for mild pain, Disp: , Rfl:     irbesartan (AVAPRO) 300 mg tablet, Take 1 tablet (300 mg total) by mouth daily at bedtime, Disp: 90 tablet, Rfl: 1    ketotifen (ZADITOR) 0.025 % ophthalmic solution, Administer 1 drop into the left eye 2 (two) times a day as needed (pink eye), Disp: 5 mL, Rfl: 0    sertraline (ZOLOFT) 50 mg tablet, Take 1 tablet (50 mg total) by mouth daily, Disp: 90 tablet, Rfl: 1  Allergies   Allergen Reactions    Amlodipine Edema    Lisinopril Cough    Pollen Extract      There were no vitals filed for this visit.

## 2024-07-30 DIAGNOSIS — F33.1 MODERATE EPISODE OF RECURRENT MAJOR DEPRESSIVE DISORDER (HCC): ICD-10-CM

## 2024-07-30 DIAGNOSIS — Z72.0 TOBACCO ABUSE: ICD-10-CM

## 2024-07-30 DIAGNOSIS — I10 ESSENTIAL HYPERTENSION: ICD-10-CM

## 2024-07-30 DIAGNOSIS — F41.9 ANXIETY: ICD-10-CM

## 2024-07-30 RX ORDER — BUPROPION HYDROCHLORIDE 150 MG/1
150 TABLET ORAL EVERY MORNING
Qty: 90 TABLET | Refills: 1 | Status: SHIPPED | OUTPATIENT
Start: 2024-07-30

## 2024-07-30 RX ORDER — IRBESARTAN 300 MG/1
300 TABLET ORAL
Qty: 30 TABLET | Refills: 0 | Status: SHIPPED | OUTPATIENT
Start: 2024-07-30

## 2024-08-01 ENCOUNTER — PATIENT OUTREACH (OUTPATIENT)
Dept: HEMATOLOGY ONCOLOGY | Facility: CLINIC | Age: 64
End: 2024-08-01

## 2024-08-01 NOTE — PROGRESS NOTES
Pt called and stated that she received a message from Transportation verifying her appointment on 8/2/24@ 1:30  Pt states that she does not have any scheduled appointments for that date.  I did outreach Conner at [facility] and he was not certain what the issue was but he did cancel the request.  Pt is aware that this has been addressed.  Pt states that she is feeling well overall and she is enjoying her summer.  Pt had no other questions or concerns at this time.  I did encourage her to call if any were to arise.

## 2024-08-02 ENCOUNTER — APPOINTMENT (OUTPATIENT)
Dept: LAB | Facility: IMAGING CENTER | Age: 64
End: 2024-08-02
Payer: COMMERCIAL

## 2024-08-02 DIAGNOSIS — Z79.811 LONG TERM CURRENT USE OF AROMATASE INHIBITOR: ICD-10-CM

## 2024-08-02 DIAGNOSIS — C50.311 MALIGNANT NEOPLASM OF LOWER-INNER QUADRANT OF RIGHT BREAST OF FEMALE, ESTROGEN RECEPTOR POSITIVE (HCC): ICD-10-CM

## 2024-08-02 DIAGNOSIS — E78.2 MIXED HYPERLIPIDEMIA: ICD-10-CM

## 2024-08-02 DIAGNOSIS — Z17.0 MALIGNANT NEOPLASM OF LOWER-INNER QUADRANT OF RIGHT BREAST OF FEMALE, ESTROGEN RECEPTOR POSITIVE (HCC): ICD-10-CM

## 2024-08-02 LAB
25(OH)D3 SERPL-MCNC: 55.4 NG/ML (ref 30–100)
ALBUMIN SERPL BCG-MCNC: 4.6 G/DL (ref 3.5–5)
ALP SERPL-CCNC: 100 U/L (ref 34–104)
ALT SERPL W P-5'-P-CCNC: 17 U/L (ref 7–52)
ANION GAP SERPL CALCULATED.3IONS-SCNC: 14 MMOL/L (ref 4–13)
AST SERPL W P-5'-P-CCNC: 18 U/L (ref 13–39)
BASOPHILS # BLD AUTO: 0.07 THOUSANDS/ÂΜL (ref 0–0.1)
BASOPHILS NFR BLD AUTO: 1 % (ref 0–1)
BILIRUB SERPL-MCNC: 0.37 MG/DL (ref 0.2–1)
BUN SERPL-MCNC: 16 MG/DL (ref 5–25)
CALCIUM SERPL-MCNC: 10.2 MG/DL (ref 8.4–10.2)
CHLORIDE SERPL-SCNC: 101 MMOL/L (ref 96–108)
CHOLEST SERPL-MCNC: 198 MG/DL
CO2 SERPL-SCNC: 24 MMOL/L (ref 21–32)
CREAT SERPL-MCNC: 1.04 MG/DL (ref 0.6–1.3)
EOSINOPHIL # BLD AUTO: 0.28 THOUSAND/ÂΜL (ref 0–0.61)
EOSINOPHIL NFR BLD AUTO: 4 % (ref 0–6)
ERYTHROCYTE [DISTWIDTH] IN BLOOD BY AUTOMATED COUNT: 14.1 % (ref 11.6–15.1)
GFR SERPL CREATININE-BSD FRML MDRD: 57 ML/MIN/1.73SQ M
GLUCOSE P FAST SERPL-MCNC: 97 MG/DL (ref 65–99)
HCT VFR BLD AUTO: 38.6 % (ref 34.8–46.1)
HDLC SERPL-MCNC: 59 MG/DL
HGB BLD-MCNC: 12.3 G/DL (ref 11.5–15.4)
IMM GRANULOCYTES # BLD AUTO: 0.02 THOUSAND/UL (ref 0–0.2)
IMM GRANULOCYTES NFR BLD AUTO: 0 % (ref 0–2)
LDLC SERPL CALC-MCNC: 104 MG/DL (ref 0–100)
LYMPHOCYTES # BLD AUTO: 1.46 THOUSANDS/ÂΜL (ref 0.6–4.47)
LYMPHOCYTES NFR BLD AUTO: 19 % (ref 14–44)
MCH RBC QN AUTO: 29.2 PG (ref 26.8–34.3)
MCHC RBC AUTO-ENTMCNC: 31.9 G/DL (ref 31.4–37.4)
MCV RBC AUTO: 92 FL (ref 82–98)
MONOCYTES # BLD AUTO: 0.45 THOUSAND/ÂΜL (ref 0.17–1.22)
MONOCYTES NFR BLD AUTO: 6 % (ref 4–12)
NEUTROPHILS # BLD AUTO: 5.32 THOUSANDS/ÂΜL (ref 1.85–7.62)
NEUTS SEG NFR BLD AUTO: 70 % (ref 43–75)
NONHDLC SERPL-MCNC: 139 MG/DL
NRBC BLD AUTO-RTO: 0 /100 WBCS
PLATELET # BLD AUTO: 313 THOUSANDS/UL (ref 149–390)
PMV BLD AUTO: 9.7 FL (ref 8.9–12.7)
POTASSIUM SERPL-SCNC: 4.4 MMOL/L (ref 3.5–5.3)
PROT SERPL-MCNC: 7.9 G/DL (ref 6.4–8.4)
RBC # BLD AUTO: 4.21 MILLION/UL (ref 3.81–5.12)
SODIUM SERPL-SCNC: 139 MMOL/L (ref 135–147)
TRIGL SERPL-MCNC: 177 MG/DL
WBC # BLD AUTO: 7.6 THOUSAND/UL (ref 4.31–10.16)

## 2024-08-02 PROCEDURE — 85025 COMPLETE CBC W/AUTO DIFF WBC: CPT

## 2024-08-02 PROCEDURE — 36415 COLL VENOUS BLD VENIPUNCTURE: CPT

## 2024-08-02 PROCEDURE — 80053 COMPREHEN METABOLIC PANEL: CPT

## 2024-08-02 PROCEDURE — 82306 VITAMIN D 25 HYDROXY: CPT

## 2024-08-02 PROCEDURE — 80061 LIPID PANEL: CPT

## 2024-08-28 ENCOUNTER — OFFICE VISIT (OUTPATIENT)
Dept: INTERNAL MEDICINE CLINIC | Facility: OTHER | Age: 64
End: 2024-08-28
Payer: COMMERCIAL

## 2024-08-28 VITALS
BODY MASS INDEX: 38.02 KG/M2 | WEIGHT: 214.6 LBS | HEART RATE: 89 BPM | HEIGHT: 63 IN | TEMPERATURE: 98.2 F | OXYGEN SATURATION: 95 % | RESPIRATION RATE: 20 BRPM | SYSTOLIC BLOOD PRESSURE: 132 MMHG | DIASTOLIC BLOOD PRESSURE: 62 MMHG

## 2024-08-28 DIAGNOSIS — Z12.11 SCREENING FOR COLON CANCER: ICD-10-CM

## 2024-08-28 DIAGNOSIS — F33.1 MODERATE EPISODE OF RECURRENT MAJOR DEPRESSIVE DISORDER (HCC): ICD-10-CM

## 2024-08-28 DIAGNOSIS — N18.2 CKD (CHRONIC KIDNEY DISEASE), STAGE II: ICD-10-CM

## 2024-08-28 DIAGNOSIS — I10 ESSENTIAL HYPERTENSION: Primary | ICD-10-CM

## 2024-08-28 DIAGNOSIS — F41.9 ANXIETY: ICD-10-CM

## 2024-08-28 DIAGNOSIS — E78.2 MIXED HYPERLIPIDEMIA: ICD-10-CM

## 2024-08-28 PROCEDURE — 99214 OFFICE O/P EST MOD 30 MIN: CPT | Performed by: INTERNAL MEDICINE

## 2024-08-28 RX ORDER — ATORVASTATIN CALCIUM 10 MG/1
10 TABLET, FILM COATED ORAL DAILY
Qty: 100 TABLET | Refills: 1 | Status: SHIPPED | OUTPATIENT
Start: 2024-08-28

## 2024-08-28 RX ORDER — IRBESARTAN 300 MG/1
300 TABLET ORAL
Qty: 100 TABLET | Refills: 1 | Status: SHIPPED | OUTPATIENT
Start: 2024-08-28

## 2024-08-28 NOTE — PROGRESS NOTES
Assessment/Plan:    CKD (chronic kidney disease), stage II  Lab Results   Component Value Date    EGFR 57 08/02/2024    EGFR 56 04/27/2023    EGFR 61 03/07/2023    CREATININE 1.04 08/02/2024    CREATININE 1.06 04/27/2023    CREATININE 0.99 03/07/2023   Continue to trend kidney function.  Avoid nephrotoxic agents.    Essential hypertension  Controlled.  Continue irbesartan 300 mg daily.    Major depressive disorder  Stable.  Continue Zoloft.    Anxiety  Controlled with Zoloft.    Mixed hyperlipidemia  Continue atorvastatin.  Discussed diet and exercise.         Diagnoses and all orders for this visit:    Essential hypertension  -     irbesartan (AVAPRO) 300 mg tablet; Take 1 tablet (300 mg total) by mouth daily at bedtime    Screening for colon cancer  -     Cologuard    Mixed hyperlipidemia  Comments:  Started on atorvastatin.  Will recheck lipid panel in 3 months  Orders:  -     atorvastatin (LIPITOR) 10 mg tablet; Take 1 tablet (10 mg total) by mouth daily    CKD (chronic kidney disease), stage II    Moderate episode of recurrent major depressive disorder (HCC)    Anxiety                  Subjective:      Patient ID: Corina Sena is a 64 y.o. female.    Chief Complaint   Patient presents with   • Follow-up     6 month - labs done 8/2/24 - no issues   • hm     Annual, cologuard, pap, phq       Corina Sena is seen today for follow up of chronic conditions.   Recent laboratory studies reviewed today with the patient.   she has been compliant with her medication regimen.     she has no complaints or concerns at this time.       Hypertension  This is a chronic problem. The current episode started more than 1 year ago. The problem is unchanged. The problem is controlled. Associated symptoms include anxiety. Pertinent negatives include no chest pain, headaches, palpitations or shortness of breath. Past treatments include angiotensin blockers. The current treatment provides moderate improvement. There are no  compliance problems.    Hyperlipidemia  This is a chronic problem. The current episode started more than 1 year ago. The problem is controlled. Recent lipid tests were reviewed and are normal. Pertinent negatives include no chest pain or shortness of breath. Current antihyperlipidemic treatment includes statins. The current treatment provides moderate improvement of lipids. There are no compliance problems.    Anxiety  Presents for follow-up visit. Patient reports no chest pain, dizziness, nausea, palpitations, shortness of breath or suicidal ideas. Symptoms occur rarely. The severity of symptoms is mild. The patient sleeps 7 hours per night. The quality of sleep is good. Nighttime awakenings: none.     Compliance with medications is %.       The following portions of the patient's history were reviewed and updated as appropriate: allergies, current medications, past family history, past medical history, past social history, past surgical history, and problem list.    Review of Systems   Constitutional:  Negative for activity change, appetite change, chills, diaphoresis, fatigue and fever.   HENT:  Negative for congestion, postnasal drip, rhinorrhea, sinus pressure, sinus pain, sneezing and sore throat.    Eyes:  Negative for visual disturbance.   Respiratory:  Negative for apnea, cough, choking, chest tightness, shortness of breath and wheezing.    Cardiovascular:  Negative for chest pain, palpitations and leg swelling.   Gastrointestinal:  Negative for abdominal distention, abdominal pain, anal bleeding, blood in stool, constipation, diarrhea, nausea and vomiting.   Endocrine: Negative for cold intolerance and heat intolerance.   Genitourinary:  Negative for difficulty urinating, dysuria and hematuria.   Musculoskeletal: Negative.    Skin: Negative.    Neurological:  Negative for dizziness, weakness, light-headedness, numbness and headaches.   Hematological:  Negative for adenopathy.    Psychiatric/Behavioral:  Negative for agitation, sleep disturbance and suicidal ideas.    All other systems reviewed and are negative.        Past Medical History:   Diagnosis Date   • Anxiety    • Arthritis     right foot/ back./ hands   • Breast cancer (HCC) 03/28/2023    RIGHT   • Cellulitis 09/13/2019   • COVID     x 2--3/7/22 and approx Sept 2022. recovered @ home   • Heartburn    • History of chemotherapy     1 dose only   • History of radiation therapy    • Hypertension    • Hypokalemia 04/01/2020   • Pneumonia of both lungs due to infectious organism 08/19/2019    pt denies   • Tobacco abuse    • Wears dentures     full uppers   • Wears glasses          Current Outpatient Medications:   •  acetaminophen (TYLENOL) 650 mg CR tablet, Take 1 tablet (650 mg total) by mouth every 8 (eight) hours as needed for mild pain, Disp: 30 tablet, Rfl: 3  •  anastrozole (ARIMIDEX) 1 mg tablet, Take 1 tablet (1 mg total) by mouth daily, Disp: 90 tablet, Rfl: 1  •  atorvastatin (LIPITOR) 10 mg tablet, Take 1 tablet (10 mg total) by mouth daily, Disp: 100 tablet, Rfl: 1  •  buPROPion (WELLBUTRIN XL) 150 mg 24 hr tablet, take 1 tablet by mouth every morning, Disp: 90 tablet, Rfl: 1  •  ibuprofen (MOTRIN) 200 mg tablet, Take 200-800 mg by mouth every 6 (six) hours as needed for mild pain, Disp: , Rfl:   •  irbesartan (AVAPRO) 300 mg tablet, Take 1 tablet (300 mg total) by mouth daily at bedtime, Disp: 100 tablet, Rfl: 1  •  sertraline (ZOLOFT) 50 mg tablet, take 1 tablet by mouth once daily, Disp: 90 tablet, Rfl: 1    Allergies   Allergen Reactions   • Amlodipine Edema   • Lisinopril Cough   • Pollen Extract        Social History   Past Surgical History:   Procedure Laterality Date   • APPENDECTOMY      APPENDISITIS SURGERY AGE 19   • BREAST BIOPSY Right 01/31/2023   • BREAST LUMPECTOMY Right 3/28/2023    Procedure: LUMPECTOMY BREAST MARY  LOCALIZED;  Surgeon: Sarah Pena MD;  Location: [facility] OR;  Service: Surgical Oncology  "  • LYMPH NODE BIOPSY Right 3/28/2023    Procedure: BIOPSY LYMPH NODE, SENTINEL LYMPHOSCINTIGRAPHY, LYMPHATIC MAPPING;  Surgeon: Sarah Pena MD;  Location[facility];  Service: Surgical Oncology   • US GUIDED BREAST BIOPSY RIGHT COMPLETE Right 01/31/2023     Family History   Problem Relation Age of Onset   • COPD Mother    • No Known Problems Father    • No Known Problems Brother    • No Known Problems Brother    • No Known Problems Brother    • No Known Problems Daughter    • No Known Problems Maternal Aunt    • Lung cancer Maternal Uncle         age at dx unk   • Ovarian cancer Maternal Grandmother         age at dx unk   • Prostate cancer Maternal Grandfather         age at dx unk   • No Known Problems Paternal Grandmother    • No Known Problems Paternal Grandfather    • Breast cancer Neg Hx    • Colon cancer Neg Hx        Objective:  /62 (BP Location: Left arm, Patient Position: Sitting, Cuff Size: Large)   Pulse 89   Temp 98.2 °F (36.8 °C) (Temporal)   Resp 20   Ht 5' 3\" (1.6 m)   Wt 97.3 kg (214 lb 9.6 oz)   SpO2 95%   BMI 38.01 kg/m²     Recent Results (from the past 1344 hour(s))   Vitamin D 25 hydroxy    Collection Time: 08/02/24  9:44 AM   Result Value Ref Range    Vit D, 25-Hydroxy 55.4 30.0 - 100.0 ng/mL   CBC and differential    Collection Time: 08/02/24  9:44 AM   Result Value Ref Range    WBC 7.60 4.31 - 10.16 Thousand/uL    RBC 4.21 3.81 - 5.12 Million/uL    Hemoglobin 12.3 11.5 - 15.4 g/dL    Hematocrit 38.6 34.8 - 46.1 %    MCV 92 82 - 98 fL    MCH 29.2 26.8 - 34.3 pg    MCHC 31.9 31.4 - 37.4 g/dL    RDW 14.1 11.6 - 15.1 %    MPV 9.7 8.9 - 12.7 fL    Platelets 313 149 - 390 Thousands/uL    nRBC 0 /100 WBCs    Segmented % 70 43 - 75 %    Immature Grans % 0 0 - 2 %    Lymphocytes % 19 14 - 44 %    Monocytes % 6 4 - 12 %    Eosinophils Relative 4 0 - 6 %    Basophils Relative 1 0 - 1 %    Absolute Neutrophils 5.32 1.85 - 7.62 Thousands/µL    Absolute Immature Grans 0.02 0.00 - 0.20 " Thousand/uL    Absolute Lymphocytes 1.46 0.60 - 4.47 Thousands/µL    Absolute Monocytes 0.45 0.17 - 1.22 Thousand/µL    Eosinophils Absolute 0.28 0.00 - 0.61 Thousand/µL    Basophils Absolute 0.07 0.00 - 0.10 Thousands/µL   Comprehensive metabolic panel    Collection Time: 08/02/24  9:44 AM   Result Value Ref Range    Sodium 139 135 - 147 mmol/L    Potassium 4.4 3.5 - 5.3 mmol/L    Chloride 101 96 - 108 mmol/L    CO2 24 21 - 32 mmol/L    ANION GAP 14 (H) 4 - 13 mmol/L    BUN 16 5 - 25 mg/dL    Creatinine 1.04 0.60 - 1.30 mg/dL    Glucose, Fasting 97 65 - 99 mg/dL    Calcium 10.2 8.4 - 10.2 mg/dL    AST 18 13 - 39 U/L    ALT 17 7 - 52 U/L    Alkaline Phosphatase 100 34 - 104 U/L    Total Protein 7.9 6.4 - 8.4 g/dL    Albumin 4.6 3.5 - 5.0 g/dL    Total Bilirubin 0.37 0.20 - 1.00 mg/dL    eGFR 57 ml/min/1.73sq m   Lipid panel    Collection Time: 08/02/24  9:44 AM   Result Value Ref Range    Cholesterol 198 See Comment mg/dL    Triglycerides 177 (H) See Comment mg/dL    HDL, Direct 59 >=50 mg/dL    LDL Calculated 104 (H) 0 - 100 mg/dL    Non-HDL-Chol (CHOL-HDL) 139 mg/dl            Physical Exam  Vitals and nursing note reviewed.   Constitutional:       General: She is not in acute distress.     Appearance: She is well-developed. She is not diaphoretic.   HENT:      Head: Normocephalic and atraumatic.   Eyes:      General:         Right eye: No discharge.         Left eye: No discharge.      Conjunctiva/sclera: Conjunctivae normal.      Pupils: Pupils are equal, round, and reactive to light.   Neck:      Thyroid: No thyromegaly.      Vascular: No JVD.   Cardiovascular:      Rate and Rhythm: Normal rate and regular rhythm.      Heart sounds: Normal heart sounds. No murmur heard.     No friction rub. No gallop.   Pulmonary:      Effort: Pulmonary effort is normal. No respiratory distress.      Breath sounds: Normal breath sounds. No wheezing or rales.   Chest:      Chest wall: No tenderness.   Abdominal:      General:  There is no distension.      Palpations: Abdomen is soft.      Tenderness: There is no abdominal tenderness.   Musculoskeletal:         General: No tenderness or deformity. Normal range of motion.      Cervical back: Normal range of motion and neck supple.   Lymphadenopathy:      Cervical: No cervical adenopathy.   Skin:     General: Skin is warm and dry.      Coloration: Skin is not pale.      Findings: No erythema or rash.   Neurological:      Mental Status: She is alert and oriented to person, place, and time.      Cranial Nerves: No cranial nerve deficit.      Coordination: Coordination normal.   Psychiatric:         Behavior: Behavior normal.         Thought Content: Thought content normal.         Judgment: Judgment normal.

## 2024-08-28 NOTE — ASSESSMENT & PLAN NOTE
Lab Results   Component Value Date    EGFR 57 08/02/2024    EGFR 56 04/27/2023    EGFR 61 03/07/2023    CREATININE 1.04 08/02/2024    CREATININE 1.06 04/27/2023    CREATININE 0.99 03/07/2023   Continue to trend kidney function.  Avoid nephrotoxic agents.

## 2024-09-11 ENCOUNTER — VBI (OUTPATIENT)
Dept: ADMINISTRATIVE | Facility: OTHER | Age: 64
End: 2024-09-11

## 2024-09-11 NOTE — TELEPHONE ENCOUNTER
09/11/24 9:02 AM     Chart reviewed for Pap Smear (HPV) aka Cervical Cancer Screening was/were not submitted to the patient's insurance.     Radha Spring MA   [facility]

## 2024-12-16 DIAGNOSIS — C50.311 MALIGNANT NEOPLASM OF LOWER-INNER QUADRANT OF RIGHT BREAST OF FEMALE, ESTROGEN RECEPTOR POSITIVE (HCC): ICD-10-CM

## 2024-12-16 DIAGNOSIS — Z17.0 MALIGNANT NEOPLASM OF LOWER-INNER QUADRANT OF RIGHT BREAST OF FEMALE, ESTROGEN RECEPTOR POSITIVE (HCC): ICD-10-CM

## 2024-12-17 RX ORDER — ANASTROZOLE 1 MG/1
1 TABLET ORAL DAILY
Qty: 90 TABLET | Refills: 1 | Status: SHIPPED | OUTPATIENT
Start: 2024-12-17

## 2024-12-18 ENCOUNTER — VBI (OUTPATIENT)
Dept: ADMINISTRATIVE | Facility: OTHER | Age: 64
End: 2024-12-18

## 2024-12-18 NOTE — TELEPHONE ENCOUNTER
12/18/24 3:34 PM     Chart reviewed for Pap Smear (HPV) aka Cervical Cancer Screening ; nothing is submitted to the patient's insurance at this time.     Woody Moreira MA   [facility]

## 2024-12-19 ENCOUNTER — TELEPHONE (OUTPATIENT)
Dept: SURGICAL ONCOLOGY | Facility: CLINIC | Age: 64
End: 2024-12-19

## 2024-12-19 NOTE — TELEPHONE ENCOUNTER
Called patient and left message to call back regarding upcoming appointment on 1/22/24 with Dr. Pena. Unfortunately, we need to reschedule this appointment due to changes to the provider's schedule. Patient can schedule with one of our nurse practitioners to be seen for a sooner appointment.

## 2024-12-23 ENCOUNTER — TELEPHONE (OUTPATIENT)
Dept: SURGICAL ONCOLOGY | Facility: CLINIC | Age: 64
End: 2024-12-23

## 2024-12-23 NOTE — TELEPHONE ENCOUNTER
Called patient and left message to call back regarding rescheduling appointment on 1/22/25 with  Becky due to changes in her schedule. Patient can schedule with one of our nurse practitioners to be seen sooner.

## 2024-12-27 ENCOUNTER — TELEPHONE (OUTPATIENT)
Dept: SURGICAL ONCOLOGY | Facility: CLINIC | Age: 64
End: 2024-12-27

## 2024-12-27 NOTE — TELEPHONE ENCOUNTER
Called patient and left message to call back regarding rescheduling 1/22/25 appointment. Per Dr. Pena, appointment time can be changed to 8:45 am and be kept for same day on 1/22/25. I changed patient's appointment time and left her a detailed message regarding the time change and informed her to contact our office if she is unable to make this time and date.

## 2025-01-21 ENCOUNTER — PATIENT OUTREACH (OUTPATIENT)
Dept: HEMATOLOGY ONCOLOGY | Facility: CLINIC | Age: 65
End: 2025-01-21

## 2025-01-21 NOTE — PROGRESS NOTES
I received a call from the Pt that she had to cancel her appointment with Dr. Pena for tomorrow at 8:45  I did explain that moving forward she would need to call the Department directly where she is scheduled so that they can reschedule her appointment and cancel transportation.  Pt is agreeable and I did provide her with the contact information so she can reschedule the appointment  I also called [facility] and spoke to Ed and cancelled her transport for tomorrow morning.

## 2025-03-06 ENCOUNTER — OFFICE VISIT (OUTPATIENT)
Dept: INTERNAL MEDICINE CLINIC | Facility: OTHER | Age: 65
End: 2025-03-06
Payer: COMMERCIAL

## 2025-03-06 VITALS
HEART RATE: 93 BPM | DIASTOLIC BLOOD PRESSURE: 80 MMHG | WEIGHT: 216.4 LBS | HEIGHT: 63 IN | TEMPERATURE: 97.3 F | BODY MASS INDEX: 38.34 KG/M2 | OXYGEN SATURATION: 98 % | SYSTOLIC BLOOD PRESSURE: 140 MMHG

## 2025-03-06 DIAGNOSIS — G57.93 NEUROPATHY OF BOTH FEET: ICD-10-CM

## 2025-03-06 DIAGNOSIS — Z12.12 SCREENING FOR COLORECTAL CANCER: ICD-10-CM

## 2025-03-06 DIAGNOSIS — Z12.31 ENCOUNTER FOR SCREENING MAMMOGRAM FOR BREAST CANCER: ICD-10-CM

## 2025-03-06 DIAGNOSIS — Z72.0 TOBACCO ABUSE: ICD-10-CM

## 2025-03-06 DIAGNOSIS — N18.2 CKD (CHRONIC KIDNEY DISEASE), STAGE II: ICD-10-CM

## 2025-03-06 DIAGNOSIS — F33.1 MODERATE EPISODE OF RECURRENT MAJOR DEPRESSIVE DISORDER (HCC): ICD-10-CM

## 2025-03-06 DIAGNOSIS — I10 ESSENTIAL HYPERTENSION: Primary | ICD-10-CM

## 2025-03-06 DIAGNOSIS — Q84.5 ENLARGED AND HYPERTROPHIC NAILS: ICD-10-CM

## 2025-03-06 DIAGNOSIS — F41.9 ANXIETY: ICD-10-CM

## 2025-03-06 DIAGNOSIS — L84 CALLUS OF FOOT: ICD-10-CM

## 2025-03-06 DIAGNOSIS — R23.4 CRACKED SKIN ON FEET: ICD-10-CM

## 2025-03-06 DIAGNOSIS — E78.2 MIXED HYPERLIPIDEMIA: ICD-10-CM

## 2025-03-06 DIAGNOSIS — Z12.11 SCREENING FOR COLORECTAL CANCER: ICD-10-CM

## 2025-03-06 PROBLEM — F17.210 LIGHT CIGARETTE SMOKER (1-9 CIGARETTES PER DAY): Status: RESOLVED | Noted: 2019-03-21 | Resolved: 2025-03-06

## 2025-03-06 PROCEDURE — 99214 OFFICE O/P EST MOD 30 MIN: CPT | Performed by: INTERNAL MEDICINE

## 2025-03-06 RX ORDER — GABAPENTIN 300 MG/1
300 CAPSULE ORAL
Qty: 30 CAPSULE | Refills: 3 | Status: SHIPPED | OUTPATIENT
Start: 2025-03-06

## 2025-03-06 NOTE — PROGRESS NOTES
Name: Corina Sena      : 1960      MRN: [id number]  Encounter Provider: Cornel Cardenas MD  Encounter Date: 3/6/2025   Encounter department: [facility] [place]  :  Assessment & Plan  Encounter for screening mammogram for breast cancer    Orders:  •  Mammo screening bilateral w 3d and cad; Future    Screening for colorectal cancer         Essential hypertension    Orders:  •  CBC; Future  •  Comprehensive metabolic panel; Future  •  Lipid panel; Future    Anxiety  Controlled with Zoloft.       Moderate episode of recurrent major depressive disorder (HCC)  Stable.  Continue Zoloft.       Mixed hyperlipidemia  Continue atorvastatin.  Discussed diet and exercise.  Orders:  •  CBC; Future  •  Comprehensive metabolic panel; Future  •  Lipid panel; Future    CKD (chronic kidney disease), stage II  Lab Results   Component Value Date    EGFR 57 2024    EGFR 56 2023    EGFR 61 2023    CREATININE 1.04 2024    CREATININE 1.06 2023    CREATININE 0.99 2023   Continue to trend kidney function.   Orders:  •  CBC; Future  •  Comprehensive metabolic panel; Future    Neuropathy of both feet    Orders:  •  Ambulatory Referral to Podiatry; Future  •  gabapentin (Neurontin) 300 mg capsule; Take 1 capsule (300 mg total) by mouth daily at bedtime    Cracked skin on feet    Orders:  •  Ambulatory Referral to Podiatry; Future    Callus of foot    Orders:  •  Ambulatory Referral to Podiatry; Future    Tobacco abuse  Discussed smoking cessation.       Enlarged and hypertrophic nails    Orders:  •  Ambulatory Referral to Podiatry; Future           History of Present Illness   Corina Sena is seen today for follow up of chronic conditions.   Recent laboratory studies reviewed today with the patient.   she has been compliant with her medication regimen.   She has been experiencing burning sensation of her feet that occurs whenever she is working. She denies any  claudication.   She admits to cracked skin of her right hear. She denies any drainage or infectious symptoms.   she has no complaints or concerns at this time.       Hyperlipidemia  This is a chronic problem. The current episode started more than 1 year ago. The problem is controlled. Recent lipid tests were reviewed and are normal. Pertinent negatives include no chest pain or shortness of breath. Current antihyperlipidemic treatment includes statins. The current treatment provides moderate improvement of lipids. There are no compliance problems.    Anxiety  Presents for follow-up visit. Patient reports no chest pain, dizziness, nausea, palpitations, shortness of breath or suicidal ideas. Symptoms occur rarely. The severity of symptoms is mild. The quality of sleep is good. Nighttime awakenings: none.     Compliance with medications is %.   Hypertension  This is a chronic problem. The current episode started more than 1 year ago. The problem is unchanged. The problem is controlled. Associated symptoms include anxiety. Pertinent negatives include no chest pain, headaches, palpitations or shortness of breath. Past treatments include angiotensin blockers. The current treatment provides moderate improvement. There are no compliance problems.      Review of Systems   Constitutional:  Negative for activity change, appetite change, chills, diaphoresis, fatigue and fever.   HENT:  Negative for congestion, postnasal drip, rhinorrhea, sinus pressure, sinus pain, sneezing and sore throat.    Eyes:  Negative for visual disturbance.   Respiratory:  Negative for apnea, cough, choking, chest tightness, shortness of breath and wheezing.    Cardiovascular:  Negative for chest pain, palpitations and leg swelling.   Gastrointestinal:  Negative for abdominal distention, abdominal pain, anal bleeding, blood in stool, constipation, diarrhea, nausea and vomiting.   Endocrine: Negative for cold intolerance and heat intolerance.  "  Genitourinary:  Negative for difficulty urinating, dysuria and hematuria.   Musculoskeletal: Negative.    Skin: Negative.    Neurological:  Negative for dizziness, weakness, light-headedness, numbness and headaches.   Hematological:  Negative for adenopathy.   Psychiatric/Behavioral:  Negative for agitation, sleep disturbance and suicidal ideas.    All other systems reviewed and are negative.      Objective   /80 (BP Location: Left arm, Patient Position: Sitting, Cuff Size: Large)   Pulse 93   Temp (!) 97.3 °F (36.3 °C)   Ht 5' 3\" (1.6 m)   Wt 98.2 kg (216 lb 6.4 oz)   SpO2 98%   BMI 38.33 kg/m²      Physical Exam  Vitals and nursing note reviewed.   Constitutional:       General: She is not in acute distress.     Appearance: She is well-developed. She is not diaphoretic.   HENT:      Head: Normocephalic and atraumatic.   Eyes:      General:         Right eye: No discharge.         Left eye: No discharge.      Conjunctiva/sclera: Conjunctivae normal.      Pupils: Pupils are equal, round, and reactive to light.   Neck:      Thyroid: No thyromegaly.      Vascular: No JVD.   Cardiovascular:      Rate and Rhythm: Normal rate and regular rhythm.      Heart sounds: Normal heart sounds. No murmur heard.     No friction rub. No gallop.   Pulmonary:      Effort: Pulmonary effort is normal. No respiratory distress.      Breath sounds: Normal breath sounds. No wheezing or rales.   Chest:      Chest wall: No tenderness.   Abdominal:      General: There is no distension.      Palpations: Abdomen is soft.      Tenderness: There is no abdominal tenderness.   Musculoskeletal:         General: No tenderness or deformity. Normal range of motion.      Cervical back: Normal range of motion and neck supple.        Feet:    Lymphadenopathy:      Cervical: No cervical adenopathy.   Skin:     General: Skin is warm and dry.      Coloration: Skin is not pale.      Findings: No erythema or rash.   Neurological:      Mental " Status: She is alert and oriented to person, place, and time.      Cranial Nerves: No cranial nerve deficit.      Coordination: Coordination normal.   Psychiatric:         Behavior: Behavior normal.         Thought Content: Thought content normal.         Judgment: Judgment normal.

## 2025-03-06 NOTE — ASSESSMENT & PLAN NOTE
Lab Results   Component Value Date    EGFR 57 08/02/2024    EGFR 56 04/27/2023    EGFR 61 03/07/2023    CREATININE 1.04 08/02/2024    CREATININE 1.06 04/27/2023    CREATININE 0.99 03/07/2023   Continue to trend kidney function.   Orders:  •  CBC; Future  •  Comprehensive metabolic panel; Future

## 2025-03-06 NOTE — ASSESSMENT & PLAN NOTE
Continue atorvastatin.  Discussed diet and exercise.  Orders:  •  CBC; Future  •  Comprehensive metabolic panel; Future  •  Lipid panel; Future

## 2025-03-31 DIAGNOSIS — E78.2 MIXED HYPERLIPIDEMIA: ICD-10-CM

## 2025-03-31 DIAGNOSIS — I10 ESSENTIAL HYPERTENSION: ICD-10-CM

## 2025-04-01 RX ORDER — IRBESARTAN 300 MG/1
300 TABLET ORAL
Qty: 100 TABLET | Refills: 1 | Status: SHIPPED | OUTPATIENT
Start: 2025-04-01

## 2025-04-01 RX ORDER — ATORVASTATIN CALCIUM 10 MG/1
10 TABLET, FILM COATED ORAL DAILY
Qty: 100 TABLET | Refills: 1 | Status: SHIPPED | OUTPATIENT
Start: 2025-04-01

## 2025-06-03 ENCOUNTER — APPOINTMENT (OUTPATIENT)
Dept: LAB | Facility: IMAGING CENTER | Age: 65
End: 2025-06-03
Payer: COMMERCIAL

## 2025-06-03 DIAGNOSIS — N18.2 CKD (CHRONIC KIDNEY DISEASE), STAGE II: ICD-10-CM

## 2025-06-03 DIAGNOSIS — E78.2 MIXED HYPERLIPIDEMIA: ICD-10-CM

## 2025-06-03 DIAGNOSIS — I10 ESSENTIAL HYPERTENSION: ICD-10-CM

## 2025-06-03 LAB
ERYTHROCYTE [DISTWIDTH] IN BLOOD BY AUTOMATED COUNT: 14.5 % (ref 11.6–15.1)
HCT VFR BLD AUTO: 40 % (ref 34.8–46.1)
HGB BLD-MCNC: 12.6 G/DL (ref 11.5–15.4)
MCH RBC QN AUTO: 29.4 PG (ref 26.8–34.3)
MCHC RBC AUTO-ENTMCNC: 31.5 G/DL (ref 31.4–37.4)
MCV RBC AUTO: 93 FL (ref 82–98)
PLATELET # BLD AUTO: 292 THOUSANDS/UL (ref 149–390)
PMV BLD AUTO: 9.8 FL (ref 8.9–12.7)
RBC # BLD AUTO: 4.29 MILLION/UL (ref 3.81–5.12)
WBC # BLD AUTO: 7.06 THOUSAND/UL (ref 4.31–10.16)

## 2025-06-03 PROCEDURE — 85027 COMPLETE CBC AUTOMATED: CPT

## 2025-06-03 PROCEDURE — 36415 COLL VENOUS BLD VENIPUNCTURE: CPT

## 2025-06-05 ENCOUNTER — OFFICE VISIT (OUTPATIENT)
Dept: INTERNAL MEDICINE CLINIC | Facility: OTHER | Age: 65
End: 2025-06-05
Payer: COMMERCIAL

## 2025-06-05 VITALS
DIASTOLIC BLOOD PRESSURE: 70 MMHG | SYSTOLIC BLOOD PRESSURE: 132 MMHG | TEMPERATURE: 98 F | HEART RATE: 89 BPM | WEIGHT: 217 LBS | BODY MASS INDEX: 38.45 KG/M2 | HEIGHT: 63 IN | OXYGEN SATURATION: 98 %

## 2025-06-05 DIAGNOSIS — F41.9 ANXIETY: ICD-10-CM

## 2025-06-05 DIAGNOSIS — F33.1 MODERATE EPISODE OF RECURRENT MAJOR DEPRESSIVE DISORDER (HCC): ICD-10-CM

## 2025-06-05 DIAGNOSIS — I10 ESSENTIAL HYPERTENSION: Primary | ICD-10-CM

## 2025-06-05 DIAGNOSIS — E66.01 CLASS 2 SEVERE OBESITY DUE TO EXCESS CALORIES WITH SERIOUS COMORBIDITY AND BODY MASS INDEX (BMI) OF 38.0 TO 38.9 IN ADULT (HCC): ICD-10-CM

## 2025-06-05 DIAGNOSIS — Z00.00 ANNUAL PHYSICAL EXAM: ICD-10-CM

## 2025-06-05 DIAGNOSIS — Z12.2 SCREENING FOR LUNG CANCER: ICD-10-CM

## 2025-06-05 DIAGNOSIS — E66.9 OBESITY (BMI 35.0-39.9 WITHOUT COMORBIDITY): ICD-10-CM

## 2025-06-05 DIAGNOSIS — E66.812 CLASS 2 SEVERE OBESITY DUE TO EXCESS CALORIES WITH SERIOUS COMORBIDITY AND BODY MASS INDEX (BMI) OF 38.0 TO 38.9 IN ADULT (HCC): ICD-10-CM

## 2025-06-05 DIAGNOSIS — E78.2 MIXED HYPERLIPIDEMIA: ICD-10-CM

## 2025-06-05 DIAGNOSIS — Z72.0 TOBACCO ABUSE: ICD-10-CM

## 2025-06-05 DIAGNOSIS — N18.2 CKD (CHRONIC KIDNEY DISEASE), STAGE II: ICD-10-CM

## 2025-06-05 PROCEDURE — 99396 PREV VISIT EST AGE 40-64: CPT | Performed by: INTERNAL MEDICINE

## 2025-06-05 PROCEDURE — 99214 OFFICE O/P EST MOD 30 MIN: CPT | Performed by: INTERNAL MEDICINE

## 2025-06-05 RX ORDER — ATORVASTATIN CALCIUM 10 MG/1
10 TABLET, FILM COATED ORAL DAILY
Qty: 100 TABLET | Refills: 1 | Status: SHIPPED | OUTPATIENT
Start: 2025-06-05

## 2025-06-05 RX ORDER — TIRZEPATIDE 2.5 MG/.5ML
2.5 INJECTION, SOLUTION SUBCUTANEOUS WEEKLY
Qty: 2 ML | Refills: 0 | Status: SHIPPED | OUTPATIENT
Start: 2025-06-05 | End: 2025-07-03

## 2025-06-05 RX ORDER — BUPROPION HYDROCHLORIDE 150 MG/1
150 TABLET ORAL EVERY MORNING
Qty: 90 TABLET | Refills: 1 | Status: SHIPPED | OUTPATIENT
Start: 2025-06-05

## 2025-06-05 RX ORDER — IRBESARTAN 300 MG/1
300 TABLET ORAL
Qty: 100 TABLET | Refills: 1 | Status: SHIPPED | OUTPATIENT
Start: 2025-06-05

## 2025-06-05 NOTE — ASSESSMENT & PLAN NOTE
Prior Authorization Clinical Questions for Weight Management Pharmacotherapy    1. Does the patient have a contrainidcation to medication prescribed for weight management?: No  2. Does the patient have a diagnosis of obesity, confirmed by a BMI greater than or equal to 30 kg/m^2?: Yes  3. Does the patient have a BMI of greater than or equal to 27 kg/m^2 with at least one weight-related comorbidity/risk factor/complication (e.g. diabetes, dyslipidemia, coronary artery disease)?: Yes  4. Weight-related co-morbidities/risk factors: dyslipidemia  5. WEGOVY CVA Indication: Does patient have established documented cardiovascular disease (history of a prior heart attack (myocardial infarction), stroke, or symptomatic peripheral arterial disease (PAD)?: No  6. ZEPBOUND PHILIPP Indication: Does patient have documented PHILIPP diagnosed via sleep study (insurance will require copy of sleep study results for approval)?: No  7. Has the patient been on a weight loss regimen of low-calorie diet, increased physical activity, and lifestyle modifications for a minimum of 6 months?: Yes  8. Has the patient completed a comprehensive weight loss program (ie, Weight Watchers, Noom, Bariatrics, other sanjana on phone)? If so, what?: Yes  9. Does the patient have a history of type 2 diabetes?: No  10. Has the member tried and failed other weight loss medication within the past 12 months?: No  11. Will the member use requested medication in combination with another GLP agonist or weight loss drug?: No  12. Is the medication a controlled substance?: No     Baseline weight (in pounds): 217 lbs  Current weight (in pounds): 217 lbs  Weight loss percentage: 0%     Discussed diet and exercise.  Will prescribe Zepbound

## 2025-06-05 NOTE — PROGRESS NOTES
Adult Annual Physical  Name: Corina Sena      : 1960      MRN: [id number]  Encounter Provider: Cornel Cardenas MD  Encounter Date: 2025   Encounter department: [facility]    :  Assessment & Plan  Screening for lung cancer  I discussed with her that she is a candidate for lung cancer CT screening.     The following Shared Decision-Making points were covered:  Benefits of screening were discussed, including the rates of reduction in death from lung cancer and other causes.  Harms of screening were reviewed, including false positive tests, radiation exposure levels, risks of invasive procedures, risks of complications of screening, and risk of overdiagnosis.  I counseled on the importance of adherence to annual lung cancer LDCT screening, impact of co-morbidities, and ability or willingness to undergo diagnosis and treatment.  I counseled on the importance of maintaining abstinence as a former smoker or was counseled on the importance of smoking cessation if a current smoker    Review of Eligibility Criteria: She meets all of the criteria for Lung Cancer Screening.   She is 65 y.o.   She has 20 pack year tobacco history and is a current smoker or has quit within the past 15 years  She presents no signs or symptoms of lung cancer    After discussion, the patient decided to elect lung cancer screening.    Orders:  •  CT Lung Screening Program; Future    Tobacco abuse  Discussed smoking cessation.  Orders:  •  CT Lung Screening Program; Future  •  buPROPion (WELLBUTRIN XL) 150 mg 24 hr tablet; Take 1 tablet (150 mg total) by mouth every morning    Anxiety  Controlled with Zoloft.  Orders:  •  buPROPion (WELLBUTRIN XL) 150 mg 24 hr tablet; Take 1 tablet (150 mg total) by mouth every morning    Moderate episode of recurrent major depressive disorder (HCC)  Stable.  Continue Zoloft.  Orders:  •  buPROPion (WELLBUTRIN XL) 150 mg 24 hr tablet; Take 1 tablet (150 mg total)  by mouth every morning    Essential hypertension  Well-controlled.  Continue current antihypertensive regimen.  Orders:  •  irbesartan (AVAPRO) 300 mg tablet; Take 1 tablet (300 mg total) by mouth daily at bedtime    Mixed hyperlipidemia  Continue atorvastatin.  Discussed diet and exercise.  Orders:  •  atorvastatin (LIPITOR) 10 mg tablet; Take 1 tablet (10 mg total) by mouth daily    CKD (chronic kidney disease), stage II  Lab Results   Component Value Date    EGFR 57 08/02/2024    EGFR 56 04/27/2023    EGFR 61 03/07/2023    CREATININE 1.04 08/02/2024    CREATININE 1.06 04/27/2023    CREATININE 0.99 03/07/2023   Continue to trend kidney function.       Class 2 severe obesity due to excess calories with serious comorbidity and body mass index (BMI) of 38.0 to 38.9 in adult (HCC)  Prior Authorization Clinical Questions for Weight Management Pharmacotherapy    1. Does the patient have a contrainidcation to medication prescribed for weight management?: No  2. Does the patient have a diagnosis of obesity, confirmed by a BMI greater than or equal to 30 kg/m^2?: Yes  3. Does the patient have a BMI of greater than or equal to 27 kg/m^2 with at least one weight-related comorbidity/risk factor/complication (e.g. diabetes, dyslipidemia, coronary artery disease)?: Yes  4. Weight-related co-morbidities/risk factors: dyslipidemia  5. WEGOVY CVA Indication: Does patient have established documented cardiovascular disease (history of a prior heart attack (myocardial infarction), stroke, or symptomatic peripheral arterial disease (PAD)?: No  6. ZEPBOUND PHILIPP Indication: Does patient have documented PHILIPP diagnosed via sleep study (insurance will require copy of sleep study results for approval)?: No  7. Has the patient been on a weight loss regimen of low-calorie diet, increased physical activity, and lifestyle modifications for a minimum of 6 months?: Yes  8. Has the patient completed a comprehensive weight loss program (ie, Weight  Watchers, Noom, Bariatrics, other sanjana on phone)? If so, what?: Yes  9. Does the patient have a history of type 2 diabetes?: No  10. Has the member tried and failed other weight loss medication within the past 12 months?: No  11. Will the member use requested medication in combination with another GLP agonist or weight loss drug?: No  12. Is the medication a controlled substance?: No     Baseline weight (in pounds): 217 lbs  Current weight (in pounds): 217 lbs  Weight loss percentage: 0%         Orders:  •  tirzepatide (Zepbound) 2.5 mg/0.5 mL auto-injector; Inject 0.5 mL (2.5 mg total) under the skin once a week for 28 days    Obesity (BMI 35.0-39.9 without comorbidity)  Prior Authorization Clinical Questions for Weight Management Pharmacotherapy    1. Does the patient have a contrainidcation to medication prescribed for weight management?: No  2. Does the patient have a diagnosis of obesity, confirmed by a BMI greater than or equal to 30 kg/m^2?: Yes  3. Does the patient have a BMI of greater than or equal to 27 kg/m^2 with at least one weight-related comorbidity/risk factor/complication (e.g. diabetes, dyslipidemia, coronary artery disease)?: Yes  4. Weight-related co-morbidities/risk factors: dyslipidemia  5. WEGOVY CVA Indication: Does patient have established documented cardiovascular disease (history of a prior heart attack (myocardial infarction), stroke, or symptomatic peripheral arterial disease (PAD)?: No  6. ZEPBOUND PHILIPP Indication: Does patient have documented PHILIPP diagnosed via sleep study (insurance will require copy of sleep study results for approval)?: No  7. Has the patient been on a weight loss regimen of low-calorie diet, increased physical activity, and lifestyle modifications for a minimum of 6 months?: Yes  8. Has the patient completed a comprehensive weight loss program (ie, Weight Watchers, Noom, Bariatrics, other sanjana on phone)? If so, what?: Yes  9. Does the patient have a history of type 2  diabetes?: No  10. Has the member tried and failed other weight loss medication within the past 12 months?: No  11. Will the member use requested medication in combination with another GLP agonist or weight loss drug?: No  12. Is the medication a controlled substance?: No     Baseline weight (in pounds): 217 lbs  Current weight (in pounds): 217 lbs  Weight loss percentage: 0%     Discussed diet and exercise.  Will prescribe Zepbound       Annual physical exam  She is up-to-date on diabetes and cardiovascular lipid screening.  She has a Cologuard kit at home for colorectal cancer screening.  She is up-to-date on breast cancer screening.             Preventive Screenings:  - Diabetes Screening: screening up-to-date, risks/benefits discussed and orders placed  - Cholesterol Screening: screening not indicated, has hyperlipidemia, risks/benefits discussed, screening up-to-date and orders placed   - Hepatitis C screening: screening up-to-date and risks/benefits discussed   - HIV screening: screening not indicated   - Cervical cancer screening: screening not indicated   - Breast cancer screening: has history of breast cancer and risks/benefits discussed   - Colon cancer screening: risks/benefits discussed and orders placed   - Lung cancer screening: risks/benefits discussed, orders placed and screening up-to-date   - Osteoporosis screening: risks/benefits discussed and screening up-to-date     Immunizations:  - Immunizations due: Prevnar 20, Tdap and Zoster (Shingrix)  - Risks/benefits immunizations discussed      Counseling/Anticipatory Guidance:  - Alcohol: discussed moderation in alcohol intake and recommendations for healthy alcohol use.   - Tobacco use: discussed harms of tobacco use and management options for quitting.   - Dental health: discussed importance of regular tooth brushing, flossing, and dental visits.   - Sexual health: discussed sexually transmitted diseases, partner selection, use of condoms, avoidance  of unintended pregnancy, and contraceptive alternatives.   - Diet: discussed recommendations for a healthy/well-balanced diet.   - Exercise: the importance of regular exercise/physical activity was discussed. Recommend exercise 3-5 times per week for at least 30 minutes.   - Injury prevention: discussed safety/seat belts, safety helmets, smoke detectors, carbon monoxide detectors, and smoking near bedding or upholstery.       Depression Screening and Follow-up Plan: Patient was screened for depression during today's encounter. They screened negative with a PHQ-9 score of 0.      Lung Cancer Screening Shared Decision Making: I discussed with her that she is a candidate for lung cancer CT screening.     The following Shared Decision-Making points were covered:  Benefits of screening were discussed, including the rates of reduction in death from lung cancer and other causes.  Harms of screening were reviewed, including false positive tests, radiation exposure levels, risks of invasive procedures, risks of complications of screening, and risk of overdiagnosis.  I counseled on the importance of adherence to annual lung cancer LDCT screening, impact of co-morbidities, and ability or willingness to undergo diagnosis and treatment.  I counseled on the importance of maintaining abstinence as a former smoker or was counseled on the importance of smoking cessation if a current smoker    Review of Eligibility Criteria: She meets all of the criteria for Lung Cancer Screening.   - She is 65 y.o.   - She has 20 pack year tobacco history and is a current smoker or has quit within the past 15 years  - She presents no signs or symptoms of lung cancer    After discussion, the patient decided to elect lung cancer screening.        History of Present Illness     Adult Annual Physical:  Patient presents for annual physical. Corina Sena is seen today for follow up of chronic conditions.   Recent laboratory studies reviewed today with  the patient.   she has been compliant with her medication regimen.     she has no complaints or concerns at this time.   .     Diet and Physical Activity:  - Diet/Nutrition: no special diet.  - Exercise: walking and 5-7 times a week on average. Lots of walking at work    Depression Screening:    - PHQ-9 Score: 0    General Health:  - Sleep: sleeps well, 4-6 hours of sleep on average and 7-8 hours of sleep on average.  - Hearing: normal hearing bilateral ears.  - Vision: no vision problems, most recent eye exam < 1 year ago and wears glasses.  - Dental: no dental visits for > 1 year, brushes teeth twice daily and does not floss.    /GYN Health:  - Follows with GYN: no.   - Menopause: postmenopausal.   - History of STDs: no  - Contraception: menopause.      Advanced Care Planning:  - Has an advanced directive?: no    - Has a durable medical POA?: no      Review of Systems   Constitutional:  Negative for activity change, appetite change, chills, diaphoresis, fatigue and fever.   HENT:  Negative for congestion, postnasal drip, rhinorrhea, sinus pressure, sinus pain, sneezing and sore throat.    Eyes:  Negative for visual disturbance.   Respiratory:  Negative for apnea, cough, choking, chest tightness, shortness of breath and wheezing.    Cardiovascular:  Negative for chest pain, palpitations and leg swelling.   Gastrointestinal:  Negative for abdominal distention, abdominal pain, anal bleeding, blood in stool, constipation, diarrhea, nausea and vomiting.   Endocrine: Negative for cold intolerance and heat intolerance.   Genitourinary:  Negative for difficulty urinating, dysuria and hematuria.   Musculoskeletal: Negative.    Skin: Negative.    Neurological:  Negative for dizziness, weakness, light-headedness, numbness and headaches.   Hematological:  Negative for adenopathy.   Psychiatric/Behavioral:  Negative for agitation, sleep disturbance and suicidal ideas.    All other systems reviewed and are  "negative.        Objective   /70 (BP Location: Left arm, Patient Position: Sitting, Cuff Size: Adult)   Pulse 89   Temp 98 °F (36.7 °C)   Ht 5' 3\" (1.6 m)   Wt 98.4 kg (217 lb)   SpO2 98%   BMI 38.44 kg/m²     Physical Exam  Vitals and nursing note reviewed.   Constitutional:       General: She is not in acute distress.     Appearance: She is well-developed. She is not diaphoretic.   HENT:      Head: Normocephalic and atraumatic.     Eyes:      General:         Right eye: No discharge.         Left eye: No discharge.      Conjunctiva/sclera: Conjunctivae normal.      Pupils: Pupils are equal, round, and reactive to light.     Neck:      Thyroid: No thyromegaly.      Vascular: No JVD.     Cardiovascular:      Rate and Rhythm: Normal rate and regular rhythm.      Heart sounds: Normal heart sounds. No murmur heard.     No friction rub. No gallop.   Pulmonary:      Effort: Pulmonary effort is normal. No respiratory distress.      Breath sounds: Normal breath sounds. No wheezing or rales.   Chest:      Chest wall: No tenderness.   Abdominal:      General: There is no distension.      Palpations: Abdomen is soft.      Tenderness: There is no abdominal tenderness.     Musculoskeletal:         General: No tenderness or deformity. Normal range of motion.      Cervical back: Normal range of motion and neck supple.   Lymphadenopathy:      Cervical: No cervical adenopathy.     Skin:     General: Skin is warm and dry.      Coloration: Skin is not pale.      Findings: No erythema or rash.     Neurological:      Mental Status: She is alert and oriented to person, place, and time.      Cranial Nerves: No cranial nerve deficit.      Coordination: Coordination normal.     Psychiatric:         Behavior: Behavior normal.         Thought Content: Thought content normal.         Judgment: Judgment normal.         "

## 2025-06-05 NOTE — ASSESSMENT & PLAN NOTE
She is up-to-date on diabetes and cardiovascular lipid screening.  She has a Cologuard kit at home for colorectal cancer screening.  She is up-to-date on breast cancer screening.

## 2025-06-05 NOTE — ASSESSMENT & PLAN NOTE
Stable.  Continue Zoloft.  Orders:  •  buPROPion (WELLBUTRIN XL) 150 mg 24 hr tablet; Take 1 tablet (150 mg total) by mouth every morning

## 2025-06-05 NOTE — ASSESSMENT & PLAN NOTE
Lab Results   Component Value Date    EGFR 57 08/02/2024    EGFR 56 04/27/2023    EGFR 61 03/07/2023    CREATININE 1.04 08/02/2024    CREATININE 1.06 04/27/2023    CREATININE 0.99 03/07/2023   Continue to trend kidney function.

## 2025-06-05 NOTE — PATIENT INSTRUCTIONS
"Patient Education     Routine physical for adults   The Basics   Written by the doctors and editors at [facility]   What is a physical? -- A physical is a routine visit, or \"check-up,\" with your doctor. You might also hear it called a \"wellness visit\" or \"preventive visit.\"  During each visit, the doctor will:   Ask about your physical and mental health   Ask about your habits, behaviors, and lifestyle   Do an exam   Give you vaccines if needed   Talk to you about any medicines you take   Give advice about your health   Answer your questions  Getting regular check-ups is an important part of taking care of your health. It can help your doctor find and treat any problems you have. But it's also important for preventing health problems.  A routine physical is different from a \"sick visit.\" A sick visit is when you see a doctor because of a health concern or problem. Since physicals are scheduled ahead of time, you can think about what you want to ask the doctor.  How often should I get a physical? -- It depends on your age and health. In general, for people age 21 years and older:   If you are younger than 50 years, you might be able to get a physical every 3 years.   If you are 50 years or older, your doctor might recommend a physical every year.  If you have an ongoing health condition, like diabetes or high blood pressure, your doctor will probably want to see you more often.  What happens during a physical? -- In general, each visit will include:   Physical exam - The doctor or nurse will check your height, weight, heart rate, and blood pressure. They will also look at your eyes and ears. They will ask about how you are feeling and whether you have any symptoms that bother you.   Medicines - It's a good idea to bring a list of all the medicines you take to each doctor visit. Your doctor will talk to you about your medicines and answer any questions. Tell them if you are having any side effects that bother you. You " "should also tell them if you are having trouble paying for any of your medicines.   Habits and behaviors - This includes:   Your diet   Your exercise habits   Whether you smoke, drink alcohol, or use drugs   Whether you are sexually active   Whether you feel safe at home  Your doctor will talk to you about things you can do to improve your health and lower your risk of health problems. They will also offer help and support. For example, if you want to quit smoking, they can give you advice and might prescribe medicines. If you want to improve your diet or get more physical activity, they can help you with this, too.   Lab tests, if needed - The tests you get will depend on your age and situation. For example, your doctor might want to check your:   Cholesterol   Blood sugar   Iron level   Vaccines - The recommended vaccines will depend on your age, health, and what vaccines you already had. Vaccines are very important because they can prevent certain serious or deadly infections.   Discussion of screening - \"Screening\" means checking for diseases or other health problems before they cause symptoms. Your doctor can recommend screening based on your age, risk, and preferences. This might include tests to check for:   Cancer, such as breast, prostate, cervical, ovarian, colorectal, prostate, lung, or skin cancer   Sexually transmitted infections, such as chlamydia and gonorrhea   Mental health conditions like depression and anxiety  Your doctor will talk to you about the different types of screening tests. They can help you decide which screenings to have. They can also explain what the results might mean.   Answering questions - The physical is a good time to ask the doctor or nurse questions about your health. If needed, they can refer you to other doctors or specialists, too.  Adults older than 65 years often need other care, too. As you get older, your doctor will talk to you about:   How to prevent falling at " home   Hearing or vision tests   Memory testing   How to take your medicines safely   Making sure that you have the help and support you need at home  All topics are updated as new evidence becomes available and our peer review process is complete.  This topic retrieved from [employer] on: May 02, 2024.  Topic 590072 Version 1.0  Release: 32.4.3 - C32.122  © 2024 UpToDate, Inc. and/or its affiliates. All rights reserved.  Consumer Information Use and Disclaimer   Disclaimer: This generalized information is a limited summary of diagnosis, treatment, and/or medication information. It is not meant to be comprehensive and should be used as a tool to help the user understand and/or assess potential diagnostic and treatment options. It does NOT include all information about conditions, treatments, medications, side effects, or risks that may apply to a specific patient. It is not intended to be medical advice or a substitute for the medical advice, diagnosis, or treatment of a health care provider based on the health care provider's examination and assessment of a patient's specific and unique circumstances. Patients must speak with a health care provider for complete information about their health, medical questions, and treatment options, including any risks or benefits regarding use of medications. This information does not endorse any treatments or medications as safe, effective, or approved for treating a specific patient. UpToDate, Inc. and its affiliates disclaim any warranty or liability relating to this information or the use thereof.The use of this information is governed by the Terms of Use, available at https://www.woltersAfluentauwer.com/en/know/clinical-effectiveness-terms. 2024© UpToDate, Inc. and its affiliates and/or licensors. All rights reserved.  Copyright   © 2024 UpToDate, Inc. and/or its affiliates. All rights reserved.

## 2025-06-05 NOTE — ASSESSMENT & PLAN NOTE
Continue atorvastatin.  Discussed diet and exercise.  Orders:  •  atorvastatin (LIPITOR) 10 mg tablet; Take 1 tablet (10 mg total) by mouth daily

## 2025-06-05 NOTE — ASSESSMENT & PLAN NOTE
Controlled with Zoloft.  Orders:  •  buPROPion (WELLBUTRIN XL) 150 mg 24 hr tablet; Take 1 tablet (150 mg total) by mouth every morning

## 2025-06-05 NOTE — ASSESSMENT & PLAN NOTE
Well-controlled.  Continue current antihypertensive regimen.  Orders:  •  irbesartan (AVAPRO) 300 mg tablet; Take 1 tablet (300 mg total) by mouth daily at bedtime

## 2025-06-05 NOTE — ASSESSMENT & PLAN NOTE
Discussed smoking cessation.  Orders:  •  CT Lung Screening Program; Future  •  buPROPion (WELLBUTRIN XL) 150 mg 24 hr tablet; Take 1 tablet (150 mg total) by mouth every morning

## 2025-06-16 DIAGNOSIS — Z17.0 MALIGNANT NEOPLASM OF LOWER-INNER QUADRANT OF RIGHT BREAST OF FEMALE, ESTROGEN RECEPTOR POSITIVE (HCC): ICD-10-CM

## 2025-06-16 DIAGNOSIS — C50.311 MALIGNANT NEOPLASM OF LOWER-INNER QUADRANT OF RIGHT BREAST OF FEMALE, ESTROGEN RECEPTOR POSITIVE (HCC): ICD-10-CM

## 2025-06-16 RX ORDER — ANASTROZOLE 1 MG/1
1 TABLET ORAL DAILY
Qty: 90 TABLET | Refills: 1 | Status: SHIPPED | OUTPATIENT
Start: 2025-06-16

## 2025-06-17 ENCOUNTER — OFFICE VISIT (OUTPATIENT)
Dept: HEMATOLOGY ONCOLOGY | Facility: CLINIC | Age: 65
End: 2025-06-17
Payer: COMMERCIAL

## 2025-06-17 VITALS
BODY MASS INDEX: 39.34 KG/M2 | DIASTOLIC BLOOD PRESSURE: 80 MMHG | HEART RATE: 78 BPM | RESPIRATION RATE: 16 BRPM | OXYGEN SATURATION: 97 % | TEMPERATURE: 97.2 F | HEIGHT: 63 IN | SYSTOLIC BLOOD PRESSURE: 130 MMHG | WEIGHT: 222 LBS

## 2025-06-17 DIAGNOSIS — Z17.0 MALIGNANT NEOPLASM OF LOWER-INNER QUADRANT OF RIGHT BREAST OF FEMALE, ESTROGEN RECEPTOR POSITIVE (HCC): Primary | ICD-10-CM

## 2025-06-17 DIAGNOSIS — Z79.811 LONG TERM CURRENT USE OF AROMATASE INHIBITOR: ICD-10-CM

## 2025-06-17 DIAGNOSIS — C50.311 MALIGNANT NEOPLASM OF LOWER-INNER QUADRANT OF RIGHT BREAST OF FEMALE, ESTROGEN RECEPTOR POSITIVE (HCC): Primary | ICD-10-CM

## 2025-06-17 PROCEDURE — 99214 OFFICE O/P EST MOD 30 MIN: CPT | Performed by: PHYSICIAN ASSISTANT

## 2025-06-17 NOTE — PROGRESS NOTES
Name: Corina Sena      : 1960      MRN: [id number]  Encounter Provider: Yanet Hernandez PA-C  Encounter Date: 2025   Encounter department: [facility]  :  Assessment & Plan  Malignant neoplasm of lower-inner quadrant of right breast of female, estrogen receptor positive (HCC)  65-year-old female presents for follow-up visit regarding history of stage IIa right-sided breast cancer grade 3 ER 90% NC 20% HER2 1+ by IHC, Ki-67 50 to 55%.    She underwent lumpectomy and sentinel lymph node biopsy resulting in CICI    She received 1 dose of chemotherapy in the adjuvant setting: Taxotere cyclophosphamide with poor tolerance and patient did not wish to continue.     She has been on adjuvant aromatase inhibitor and tolerating well. She is also taking calcium and vitamin D.     Mammo and CT lung screening to be scheduled with patient and staff today.          Long term current use of aromatase inhibitor  DEXA due to 2026            Assessment & Plan        No follow-ups on file.    History of Present Illness   Chief Complaint   Patient presents with    Follow-up     History of Present Illness    Oncology History   Cancer Staging   Malignant neoplasm of lower-inner quadrant of right breast of female, estrogen receptor positive (HCC)  Staging form: Breast, AJCC 8th Edition  - Clinical stage from 2023: Stage IIA (cT2, cN0, cM0, G3, ER+, NC+, HER2-) - Signed by Sarah Pena MD on 2023  Stage prefix: Initial diagnosis  Method of lymph node assessment: Clinical  Histologic grading system: 3 grade system  - Pathologic stage from 2023: Stage IB (pT2, pN0(sn), cM0, G3, ER+, NC+, HER2-) - Signed by Sarah Pena MD on 2023  Stage prefix: Initial diagnosis  Method of lymph node assessment: [place] lymph node biopsy  Histologic grading system: 3 grade system  Oncology History   Malignant neoplasm of lower-inner quadrant of right breast of female,  estrogen receptor positive (HCC)   1/31/2023 Biopsy    Right breast biopsy:  - Invasive ductal carcinoma  Grade 3  ER 90%  DC 15%  HER2 negative     2/22/2023 -  Cancer Staged    Staging form: Breast, AJCC 8th Edition  - Clinical stage from 2/22/2023: Stage IIA (cT2, cN0, cM0, G3, ER+, DC+, HER2-) - Signed by Sarah Pena MD on 2/22/2023  Stage prefix: Initial diagnosis  Method of lymph node assessment: Clinical  Histologic grading system: 3 grade system       3/28/2023 Surgery    Right breast lumpectomy with sentinel lymph node biopsy:  - clear margins  - 0/3 lymph nodes    Dr. Pena     4/17/2023 -  Cancer Staged    Staging form: Breast, AJCC 8th Edition  - Pathologic stage from 4/17/2023: Stage IB (pT2, pN0(sn), cM0, G3, ER+, DC+, HER2-) - Signed by Sarah Pena MD on 4/17/2023  Stage prefix: Initial diagnosis  Method of lymph node assessment: [place] lymph node biopsy  Histologic grading system: 3 grade system       5/1/2023 - 5/2/2023 Chemotherapy    Pegfilgrastim-bmez (ZIEXTENZO), 6 mg, Subcutaneous, Once, 1 of 4 cycles  Administration: 6 mg (5/2/2023)  cyclophosphamide (CYTOXAN) IVPB, 600 mg/m2 = 1,170 mg, Intravenous, Once, 1 of 4 cycles  Administration: 1,170 mg (5/1/2023)  DOCEtaxel (TAXOTERE) chemo infusion, 75 mg/m2 = 146.2 mg, Intravenous, Once, 1 of 4 cycles  Administration: 146 mg (5/1/2023)     5/2023 -  Hormone Therapy    Anastrozole  Dr. Tamez     10/12/2023 - 11/27/2023 Radiation    Right whole breast  Dr. Bauman     10/23/2023 - 11/27/2023 Radiation      Plan ID Energy Fractions Dose per Fraction (cGy) Dose Correction (cGy) Total Dose Delivered (cGy) Elapsed Days   R Boost 10X/6X 4 / 4 250 0 1,000 7   R Breast 10X/6X 15 / 15 267 0 4,005 25      Treatment dates:  C1: 10/23/2023 - 11/27/2023            Pertinent Medical History     06/17/25:     65-year-old female presents for follow-up visit regarding history of stage IIa right-sided breast cancer grade 3 ER 90% DC 20% HER2 1+ by IHC, Ki-67 50  "to 55%.    She underwent lumpectomy and sentinel lymph node biopsy resulting in CICI  She received 1 dose of chemotherapy in the adjuvant setting: Taxotere cyclophosphamide with poor tolerance and patient did not wish to continue.     She has been on adjuvant aromatase inhibitor and tolerating well, only occasional hot flashes.        Review of Systems   Constitutional:  Negative for appetite change, chills, fatigue, fever and unexpected weight change.   HENT:  Negative for mouth sores and nosebleeds.    Respiratory:  Negative for cough and shortness of breath.    Cardiovascular:  Negative for chest pain, palpitations and leg swelling.   Gastrointestinal:  Negative for abdominal pain, blood in stool, constipation, diarrhea, nausea and vomiting.   Genitourinary:  Negative for difficulty urinating, dysuria and hematuria.   Musculoskeletal:  Negative for arthralgias.   Skin: Negative.    Neurological:  Negative for dizziness, weakness, light-headedness, numbness and headaches.   Hematological: Negative.    Psychiatric/Behavioral: Negative.             Objective   /80 (BP Location: Left arm, Patient Position: Sitting, Cuff Size: Adult)   Pulse 78   Temp (!) 97.2 °F (36.2 °C) (Skin)   Resp 16   Ht 5' 3\" (1.6 m)   Wt 101 kg (222 lb)   SpO2 97%   BMI 39.33 kg/m²     Pain Screening:  Pain Score: 0-No pain  ECOG  0   Physical Exam  Vitals reviewed.   Constitutional:       General: She is not in acute distress.     Appearance: She is well-developed. She is not ill-appearing.   HENT:      Head: Normocephalic and atraumatic.     Eyes:      General: No scleral icterus.     Conjunctiva/sclera: Conjunctivae normal.       Cardiovascular:      Rate and Rhythm: Normal rate and regular rhythm.      Heart sounds: Normal heart sounds. No murmur heard.  Pulmonary:      Effort: Pulmonary effort is normal. No respiratory distress.      Breath sounds: Normal breath sounds.   Chest:   Breasts:     Left: Normal. No swelling, " bleeding, inverted nipple, mass or tenderness.        Comments: Right breast s/p lumpectomy  Abdominal:      Palpations: Abdomen is soft.      Tenderness: There is no abdominal tenderness.     Musculoskeletal:         General: No tenderness. Normal range of motion.      Cervical back: Normal range of motion and neck supple.      Right lower leg: No edema.      Left lower leg: No edema.   Lymphadenopathy:      Cervical: No cervical adenopathy.      Upper Body:      Right upper body: No supraclavicular adenopathy.      Left upper body: No supraclavicular adenopathy.     Skin:     General: Skin is warm and dry.     Neurological:      Mental Status: She is alert and oriented to person, place, and time.      Cranial Nerves: No cranial nerve deficit.     Psychiatric:         Mood and Affect: Mood normal.         Behavior: Behavior normal.       Physical Exam      Results    Labs: I have reviewed the following labs:  Lab Results   Component Value Date/Time    WBC 7.06 06/03/2025 07:02 AM    RBC 4.29 06/03/2025 07:02 AM    Hemoglobin 12.6 06/03/2025 07:02 AM    Hematocrit 40.0 06/03/2025 07:02 AM    MCV 93 06/03/2025 07:02 AM    MCH 29.4 06/03/2025 07:02 AM    RDW 14.5 06/03/2025 07:02 AM    Platelets 292 06/03/2025 07:02 AM

## 2025-06-17 NOTE — ASSESSMENT & PLAN NOTE
65-year-old female presents for follow-up visit regarding history of stage IIa right-sided breast cancer grade 3 ER 90% NM 20% HER2 1+ by IHC, Ki-67 50 to 55%.    She underwent lumpectomy and sentinel lymph node biopsy resulting in CICI    She received 1 dose of chemotherapy in the adjuvant setting: Taxotere cyclophosphamide with poor tolerance and patient did not wish to continue.     She has been on adjuvant aromatase inhibitor and tolerating well. She is also taking calcium and vitamin D.     Mammo and CT lung screening to be scheduled with patient and staff today.

## 2025-06-27 ENCOUNTER — TELEPHONE (OUTPATIENT)
Dept: RADIATION ONCOLOGY | Facility: CLINIC | Age: 65
End: 2025-06-27

## 2025-06-27 NOTE — TELEPHONE ENCOUNTER
Spoke with pt and rescheduled 7/25/25 apt to 8/5/25 due to provider not being at the [place] site on date of original apt.

## 2025-07-08 ENCOUNTER — TELEPHONE (OUTPATIENT)
Age: 65
End: 2025-07-08

## 2025-07-08 ENCOUNTER — OFFICE VISIT (OUTPATIENT)
Dept: INTERNAL MEDICINE CLINIC | Facility: OTHER | Age: 65
End: 2025-07-08
Payer: COMMERCIAL

## 2025-07-08 VITALS
DIASTOLIC BLOOD PRESSURE: 80 MMHG | OXYGEN SATURATION: 98 % | SYSTOLIC BLOOD PRESSURE: 158 MMHG | HEART RATE: 99 BPM | BODY MASS INDEX: 38.09 KG/M2 | WEIGHT: 215 LBS | HEIGHT: 63 IN | TEMPERATURE: 98.2 F

## 2025-07-08 DIAGNOSIS — N18.2 CKD (CHRONIC KIDNEY DISEASE), STAGE II: ICD-10-CM

## 2025-07-08 DIAGNOSIS — F33.1 MODERATE EPISODE OF RECURRENT MAJOR DEPRESSIVE DISORDER (HCC): ICD-10-CM

## 2025-07-08 DIAGNOSIS — J20.9 ACUTE BRONCHITIS, UNSPECIFIED ORGANISM: Primary | ICD-10-CM

## 2025-07-08 DIAGNOSIS — F41.9 ANXIETY: ICD-10-CM

## 2025-07-08 DIAGNOSIS — I10 ESSENTIAL HYPERTENSION: ICD-10-CM

## 2025-07-08 PROCEDURE — 99214 OFFICE O/P EST MOD 30 MIN: CPT

## 2025-07-08 RX ORDER — ALBUTEROL SULFATE 90 UG/1
2 INHALANT RESPIRATORY (INHALATION) EVERY 4 HOURS PRN
COMMUNITY
Start: 2025-06-27

## 2025-07-08 RX ORDER — BENZONATATE 200 MG/1
200 CAPSULE ORAL 3 TIMES DAILY PRN
COMMUNITY
Start: 2025-06-27

## 2025-07-08 RX ORDER — FLUTICASONE PROPIONATE 50 MCG
2 SPRAY, SUSPENSION (ML) NASAL
COMMUNITY
Start: 2025-06-27

## 2025-07-08 RX ORDER — IRBESARTAN 300 MG/1
300 TABLET ORAL
Qty: 100 TABLET | Refills: 1 | Status: SHIPPED | OUTPATIENT
Start: 2025-07-08

## 2025-07-08 NOTE — ASSESSMENT & PLAN NOTE
Stable with current regimen of Wellbutrin 150 mg daily and Zoloft 50 mg daily  Orders:    sertraline (ZOLOFT) 50 mg tablet; Take 1 tablet (50 mg total) by mouth daily

## 2025-07-08 NOTE — ASSESSMENT & PLAN NOTE
Uncontrolled.  /80.  Patient reports that she has been out of her irbesartan for the last several days due to her pharmacy closing  Will refill her irbesartan today, encourage patient to take blood pressure at home and report to office.  Goal BP less than 130/90  Avoid pseudoephedrine products  Orders:    irbesartan (AVAPRO) 300 mg tablet; Take 1 tablet (300 mg total) by mouth daily at bedtime

## 2025-07-08 NOTE — TELEPHONE ENCOUNTER
Patient called to schedule appointment in the [place] office for today or tomorrow.  She is having chest congestion, cough with mucous, sinus congestion.  Started 2 weeks ago.  Spoke to Samantha in office and will see if she can be seen tomorrow and call her back.  Patient said around 2 PM would work best if ok.    Thank you

## 2025-07-08 NOTE — ASSESSMENT & PLAN NOTE
Lab Results   Component Value Date    EGFR 57 08/02/2024    EGFR 56 04/27/2023    EGFR 61 03/07/2023    CREATININE 1.04 08/02/2024    CREATININE 1.06 04/27/2023    CREATININE 0.99 03/07/2023   Renal function stable.  Avoid nephrotoxins

## 2025-07-08 NOTE — PROGRESS NOTES
Name: Corina Sena      : 1960      MRN: [id number]  Encounter Provider: Rosemarie Bradshaw PA-C  Encounter Date: 2025   Encounter department: [facility]  :  Assessment & Plan  Essential hypertension  Uncontrolled.  /80.  Patient reports that she has been out of her irbesartan for the last several days due to her pharmacy closing  Will refill her irbesartan today, encourage patient to take blood pressure at home and report to office.  Goal BP less than 130/90  Avoid pseudoephedrine products  Orders:    irbesartan (AVAPRO) 300 mg tablet; Take 1 tablet (300 mg total) by mouth daily at bedtime    Anxiety  Stable with current regimen of Wellbutrin 150 mg daily and Zoloft 50 mg daily  Orders:    sertraline (ZOLOFT) 50 mg tablet; Take 1 tablet (50 mg total) by mouth daily    Moderate episode of recurrent major depressive disorder (HCC)  Stable with current regimen of Wellbutrin 150 mg daily and Zoloft 50 mg daily    Orders:    sertraline (ZOLOFT) 50 mg tablet; Take 1 tablet (50 mg total) by mouth daily    Acute bronchitis, unspecified organism  URI symptoms x 2-3 weeks  Will treat with Augmentin twice daily x 7 days.  Take with food to prevent GI upset  Advised Flonase twice daily, second-generation antihistamine such as Zyrtec daily, Mucinex twice daily as needed  Continue Tessalon Perles as needed for cough  Continue albuterol inhaler as needed for shortness of breath or wheezing  Advised increased rest, fluid intake  Discussed symptomatic care including salt water gargles for sore throat, steam showers for congestion, warm liquids   Patient can take Tylenol/ibuprofen for fevers and body aches  Discussed red flag symptoms including going to the ER with chest pain or shortness of breath  Discussed course of illness could be 1 to 2 weeks.   Return to the office for reevaluation if symptoms do not improve in 1-2 weeks     Orders:    amoxicillin-clavulanate  "(AUGMENTIN) 875-125 mg per tablet; Take 1 tablet by mouth every 12 (twelve) hours for 7 days    CKD (chronic kidney disease), stage II  Lab Results   Component Value Date    EGFR 57 08/02/2024    EGFR 56 04/27/2023    EGFR 61 03/07/2023    CREATININE 1.04 08/02/2024    CREATININE 1.06 04/27/2023    CREATININE 0.99 03/07/2023   Renal function stable.  Avoid nephrotoxins                History of Present Illness   Patient is a 65-year-old female presenting to the office for URI symptoms x 2-3 weeks  Patient was seen by [facility] 2 weeks ago who gave her tessalon perles, albuterol inhaler. No other medications   Patient reports that her symptoms are worsening  She endorses congestion, cough, ear pain, rhinorrhea, sinus tenderness, shortness of breath with exertion  She has not tried any additional medications in addition to those prescribed as above        URI   This is a new problem. The current episode started 1 to 4 weeks ago. Associated symptoms include congestion, coughing, ear pain and rhinorrhea. Pertinent negatives include no abdominal pain, chest pain, diarrhea, headaches, nausea, sore throat, vomiting or wheezing.     Review of Systems   Constitutional:  Negative for chills, fatigue and fever.   HENT:  Positive for congestion, ear pain, postnasal drip, rhinorrhea and sinus pressure. Negative for sore throat and trouble swallowing.    Eyes:  Negative for discharge and redness.   Respiratory:  Positive for cough and shortness of breath. Negative for chest tightness and wheezing.    Cardiovascular:  Negative for chest pain and palpitations.   Gastrointestinal:  Negative for abdominal pain, diarrhea, nausea and vomiting.   Neurological:  Negative for dizziness, light-headedness and headaches.       Objective   /80 (BP Location: Left arm, Patient Position: Sitting, Cuff Size: Large)   Pulse 99   Temp 98.2 °F (36.8 °C)   Ht 5' 3\" (1.6 m)   Wt 97.5 kg (215 lb)   SpO2 98%   BMI 38.09 kg/m²      Physical " Exam  Vitals and nursing note reviewed.   Constitutional:       General: She is not in acute distress.     Appearance: Normal appearance. She is not ill-appearing.   HENT:      Head: Normocephalic and atraumatic.      Right Ear: Tympanic membrane and external ear normal. Tympanic membrane is not erythematous.      Left Ear: Tympanic membrane, ear canal and external ear normal. Tympanic membrane is not erythematous.      Ears:      Comments: Right ear canal erythema     Nose: Congestion present. No rhinorrhea.      Right Sinus: Maxillary sinus tenderness present. No frontal sinus tenderness.      Left Sinus: Maxillary sinus tenderness present. No frontal sinus tenderness.      Mouth/Throat:      Mouth: Mucous membranes are moist.      Pharynx: Oropharynx is clear. No oropharyngeal exudate or posterior oropharyngeal erythema.     Eyes:      General: No scleral icterus.        Right eye: No discharge.         Left eye: No discharge.      Conjunctiva/sclera: Conjunctivae normal.      Pupils: Pupils are equal, round, and reactive to light.       Cardiovascular:      Rate and Rhythm: Normal rate and regular rhythm.      Heart sounds: Normal heart sounds. No murmur heard.     No friction rub. No gallop.   Pulmonary:      Effort: Pulmonary effort is normal. No respiratory distress.      Breath sounds: Normal breath sounds. No wheezing, rhonchi or rales.      Comments: Harsh barking sounding cough  Chest:      Chest wall: No tenderness.     Musculoskeletal:      Cervical back: No tenderness.      Right lower leg: No edema.      Left lower leg: No edema.   Lymphadenopathy:      Cervical: No cervical adenopathy.     Skin:     General: Skin is warm and dry.      Coloration: Skin is not pale.      Findings: No erythema.     Neurological:      General: No focal deficit present.      Mental Status: She is alert and oriented to person, place, and time. Mental status is at baseline.     Psychiatric:         Mood and Affect: Mood  normal.         Behavior: Behavior normal.       Disclaimer: This note was generated with voice recognition software.  Phonetic, grammatical, and spelling errors may be present as a result.  Please contact provider with any concerns or questions

## 2025-07-08 NOTE — PATIENT INSTRUCTIONS
Augmentin twice daily- take with food   Advised Flonase twice daily, second-generation antihistamine such as Zyrtec daily, Mucinex twice daily  Advised increased rest, fluid intake  Discussed symptomatic care including salt water gargles for sore throat, steam showers for congestion, warm liquids   Patient can take Tylenol/ibuprofen for fevers and body aches  Discussed red flag symptoms including going to the ER with chest pain or shortness of breath  Discussed course of illness could be 1 to 2 weeks.   Return to the office for reevaluation if symptoms do not improve in 1-2 weeks

## 2025-07-23 ENCOUNTER — HOSPITAL ENCOUNTER (OUTPATIENT)
Dept: RADIOLOGY | Facility: IMAGING CENTER | Age: 65
Discharge: HOME | End: 2025-07-23
Attending: INTERNAL MEDICINE
Payer: COMMERCIAL

## 2025-07-23 ENCOUNTER — HOSPITAL ENCOUNTER (OUTPATIENT)
Dept: RADIOLOGY | Facility: IMAGING CENTER | Age: 65
End: 2025-07-23
Attending: INTERNAL MEDICINE
Payer: COMMERCIAL

## 2025-07-23 DIAGNOSIS — Z12.2 SCREENING FOR LUNG CANCER: ICD-10-CM

## 2025-07-23 DIAGNOSIS — Z72.0 TOBACCO ABUSE: ICD-10-CM

## 2025-07-23 PROCEDURE — 71271 CT THORAX LUNG CANCER SCR C-: CPT

## 2025-08-01 ENCOUNTER — TELEPHONE (OUTPATIENT)
Dept: RADIATION ONCOLOGY | Facility: CLINIC | Age: 65
End: 2025-08-01

## 2025-08-04 ENCOUNTER — TELEPHONE (OUTPATIENT)
Dept: RADIATION ONCOLOGY | Facility: CLINIC | Age: 65
End: 2025-08-04

## (undated) DEVICE — SUT MONOCRYL 4-0 PS-2 27 IN Y426H

## (undated) DEVICE — DRAPE SHEET THREE QUARTER

## (undated) DEVICE — SUPER SPONGES,MEDIUM: Brand: KERLIX

## (undated) DEVICE — PROVE COVER: Brand: UNBRANDED

## (undated) DEVICE — ADHESIVE SKIN HIGH VISCOSITY EXOFIN 1ML

## (undated) DEVICE — 2000CC GUARDIAN II: Brand: GUARDIAN

## (undated) DEVICE — BRA SURGICAL SZ XLGE (39-42)

## (undated) DEVICE — HEMOSTAT POWDER ADSORB SURGICEL 3GM

## (undated) DEVICE — PLUMEPEN PRO 10FT

## (undated) DEVICE — SPECIAMEN GRID KLINITRAY SM RT

## (undated) DEVICE — GLOVE SRG BIOGEL 6

## (undated) DEVICE — SUT SILK 2-0 SH 30 IN K833H

## (undated) DEVICE — MEDI-VAC YANKAUER SUCTION HANDLE W/BULBOUS AND CONTROL VENT: Brand: CARDINAL HEALTH

## (undated) DEVICE — SUT MONOCRYL 3-0 SH 27 IN Y416H

## (undated) DEVICE — BETHLEHEM UNIVERSAL MINOR GEN: Brand: CARDINAL HEALTH

## (undated) DEVICE — TUBING SUCTION 5MM X 12 FT

## (undated) DEVICE — WET SKIN PREP TRAY: Brand: MEDLINE INDUSTRIES, INC.

## (undated) DEVICE — DRAPE EQUIPMENT RF WAND

## (undated) DEVICE — SCD SEQUENTIAL COMPRESSION COMFORT SLEEVE MEDIUM KNEE LENGTH: Brand: KENDALL SCD

## (undated) DEVICE — NEEDLE 25G X 1 1/2

## (undated) DEVICE — GAUZE SPONGES,16 PLY: Brand: CURITY